# Patient Record
Sex: FEMALE | Race: WHITE | Employment: FULL TIME | URBAN - METROPOLITAN AREA
[De-identification: names, ages, dates, MRNs, and addresses within clinical notes are randomized per-mention and may not be internally consistent; named-entity substitution may affect disease eponyms.]

---

## 2023-01-13 ENCOUNTER — HOSPITAL ENCOUNTER (EMERGENCY)
Facility: HOSPITAL | Age: 33
Discharge: HOME | End: 2023-01-13
Attending: EMERGENCY MEDICINE

## 2023-01-13 VITALS
WEIGHT: 200 LBS | TEMPERATURE: 98 F | BODY MASS INDEX: 34.15 KG/M2 | HEIGHT: 64 IN | HEART RATE: 85 BPM | SYSTOLIC BLOOD PRESSURE: 144 MMHG | DIASTOLIC BLOOD PRESSURE: 93 MMHG | OXYGEN SATURATION: 97 % | RESPIRATION RATE: 17 BRPM

## 2023-01-13 DIAGNOSIS — G43.709 CHRONIC MIGRAINE WITHOUT AURA WITHOUT STATUS MIGRAINOSUS, NOT INTRACTABLE: Primary | ICD-10-CM

## 2023-01-13 LAB
ALBUMIN SERPL-MCNC: 4 G/DL (ref 3.4–5)
ALBUMIN/GLOB SERPL: 1 {RATIO} (ref 1–2)
ALP LIVER SERPL-CCNC: 124 U/L
ALT SERPL-CCNC: 27 U/L
ANION GAP SERPL CALC-SCNC: 6 MMOL/L (ref 0–18)
AST SERPL-CCNC: 20 U/L (ref 15–37)
BASOPHILS # BLD AUTO: 0.05 X10(3) UL (ref 0–0.2)
BASOPHILS NFR BLD AUTO: 0.4 %
BILIRUB SERPL-MCNC: 0.2 MG/DL (ref 0.1–2)
BUN BLD-MCNC: 12 MG/DL (ref 7–18)
CALCIUM BLD-MCNC: 9.9 MG/DL (ref 8.5–10.1)
CHLORIDE SERPL-SCNC: 106 MMOL/L (ref 98–112)
CO2 SERPL-SCNC: 26 MMOL/L (ref 21–32)
CREAT BLD-MCNC: 0.73 MG/DL
EOSINOPHIL # BLD AUTO: 0.03 X10(3) UL (ref 0–0.7)
EOSINOPHIL NFR BLD AUTO: 0.2 %
ERYTHROCYTE [DISTWIDTH] IN BLOOD BY AUTOMATED COUNT: 12.7 %
GFR SERPLBLD BASED ON 1.73 SQ M-ARVRAT: 113 ML/MIN/1.73M2 (ref 60–?)
GLOBULIN PLAS-MCNC: 4.2 G/DL (ref 2.8–4.4)
GLUCOSE BLD-MCNC: 97 MG/DL (ref 70–99)
HCT VFR BLD AUTO: 44.1 %
HGB BLD-MCNC: 15 G/DL
IMM GRANULOCYTES # BLD AUTO: 0.05 X10(3) UL (ref 0–1)
IMM GRANULOCYTES NFR BLD: 0.4 %
LYMPHOCYTES # BLD AUTO: 3.25 X10(3) UL (ref 1–4)
LYMPHOCYTES NFR BLD AUTO: 25.6 %
MCH RBC QN AUTO: 31.2 PG (ref 26–34)
MCHC RBC AUTO-ENTMCNC: 34 G/DL (ref 31–37)
MCV RBC AUTO: 91.7 FL
MONOCYTES # BLD AUTO: 0.87 X10(3) UL (ref 0.1–1)
MONOCYTES NFR BLD AUTO: 6.9 %
NEUTROPHILS # BLD AUTO: 8.45 X10 (3) UL (ref 1.5–7.7)
NEUTROPHILS # BLD AUTO: 8.45 X10(3) UL (ref 1.5–7.7)
NEUTROPHILS NFR BLD AUTO: 66.5 %
OSMOLALITY SERPL CALC.SUM OF ELEC: 286 MOSM/KG (ref 275–295)
PLATELET # BLD AUTO: 285 10(3)UL (ref 150–450)
POTASSIUM SERPL-SCNC: 3.7 MMOL/L (ref 3.5–5.1)
PROT SERPL-MCNC: 8.2 G/DL (ref 6.4–8.2)
RBC # BLD AUTO: 4.81 X10(6)UL
SODIUM SERPL-SCNC: 138 MMOL/L (ref 136–145)
WBC # BLD AUTO: 12.7 X10(3) UL (ref 4–11)

## 2023-01-13 PROCEDURE — 93005 ELECTROCARDIOGRAM TRACING: CPT

## 2023-01-13 PROCEDURE — 80053 COMPREHEN METABOLIC PANEL: CPT | Performed by: EMERGENCY MEDICINE

## 2023-01-13 PROCEDURE — 99285 EMERGENCY DEPT VISIT HI MDM: CPT

## 2023-01-13 PROCEDURE — 96374 THER/PROPH/DIAG INJ IV PUSH: CPT

## 2023-01-13 PROCEDURE — 96375 TX/PRO/DX INJ NEW DRUG ADDON: CPT

## 2023-01-13 PROCEDURE — 99284 EMERGENCY DEPT VISIT MOD MDM: CPT

## 2023-01-13 PROCEDURE — 93010 ELECTROCARDIOGRAM REPORT: CPT

## 2023-01-13 PROCEDURE — 85025 COMPLETE CBC W/AUTO DIFF WBC: CPT | Performed by: EMERGENCY MEDICINE

## 2023-01-13 PROCEDURE — 96361 HYDRATE IV INFUSION ADD-ON: CPT

## 2023-01-13 RX ORDER — KETOROLAC TROMETHAMINE 15 MG/ML
15 INJECTION, SOLUTION INTRAMUSCULAR; INTRAVENOUS ONCE
Status: COMPLETED | OUTPATIENT
Start: 2023-01-13 | End: 2023-01-13

## 2023-01-13 RX ORDER — METOCLOPRAMIDE HYDROCHLORIDE 5 MG/ML
10 INJECTION INTRAMUSCULAR; INTRAVENOUS ONCE
Status: COMPLETED | OUTPATIENT
Start: 2023-01-13 | End: 2023-01-13

## 2023-01-13 RX ORDER — BUTALBITAL, ACETAMINOPHEN AND CAFFEINE 50; 325; 40 MG/1; MG/1; MG/1
1 TABLET ORAL EVERY 6 HOURS PRN
Qty: 12 TABLET | Refills: 0 | Status: SHIPPED | OUTPATIENT
Start: 2023-01-13

## 2023-01-13 RX ORDER — DEXAMETHASONE SODIUM PHOSPHATE 10 MG/ML
10 INJECTION, SOLUTION INTRAMUSCULAR; INTRAVENOUS ONCE
Status: COMPLETED | OUTPATIENT
Start: 2023-01-13 | End: 2023-01-13

## 2023-01-13 RX ORDER — DIPHENHYDRAMINE HYDROCHLORIDE 50 MG/ML
25 INJECTION INTRAMUSCULAR; INTRAVENOUS ONCE
Status: COMPLETED | OUTPATIENT
Start: 2023-01-13 | End: 2023-01-13

## 2023-01-13 NOTE — ED INITIAL ASSESSMENT (HPI)
Patient here for eval of a migraine for a week. Patient states she went to  yesterday and her BP was high. +N/V. Also c/o lightheadedness.

## 2023-01-14 LAB
ATRIAL RATE: 80 BPM
P AXIS: 17 DEGREES
P-R INTERVAL: 150 MS
Q-T INTERVAL: 384 MS
QRS DURATION: 94 MS
QTC CALCULATION (BEZET): 442 MS
R AXIS: 51 DEGREES
T AXIS: 18 DEGREES
VENTRICULAR RATE: 80 BPM

## 2023-05-29 ENCOUNTER — HOSPITAL ENCOUNTER (EMERGENCY)
Facility: HOSPITAL | Age: 33
Discharge: LEFT BEFORE DISCHARGE | End: 2023-05-29
Payer: MEDICAID

## 2023-05-29 VITALS
DIASTOLIC BLOOD PRESSURE: 104 MMHG | TEMPERATURE: 97 F | OXYGEN SATURATION: 97 % | HEIGHT: 64 IN | HEART RATE: 88 BPM | WEIGHT: 216 LBS | RESPIRATION RATE: 18 BRPM | SYSTOLIC BLOOD PRESSURE: 136 MMHG | BODY MASS INDEX: 36.88 KG/M2

## 2023-05-29 RX ORDER — SUMATRIPTAN 20 MG/1
SPRAY NASAL EVERY 2 HOUR PRN
COMMUNITY
End: 2023-06-05 | Stop reason: ALTCHOICE

## 2023-05-30 ENCOUNTER — HOSPITAL ENCOUNTER (EMERGENCY)
Facility: HOSPITAL | Age: 33
Discharge: HOME | End: 2023-05-30
Attending: EMERGENCY MEDICINE
Payer: MEDICAID

## 2023-05-30 VITALS
HEART RATE: 107 BPM | SYSTOLIC BLOOD PRESSURE: 142 MMHG | OXYGEN SATURATION: 98 % | TEMPERATURE: 98 F | BODY MASS INDEX: 37 KG/M2 | WEIGHT: 216.06 LBS | DIASTOLIC BLOOD PRESSURE: 93 MMHG | RESPIRATION RATE: 18 BRPM

## 2023-05-30 DIAGNOSIS — G43.809 OTHER MIGRAINE WITHOUT STATUS MIGRAINOSUS, NOT INTRACTABLE: Primary | ICD-10-CM

## 2023-05-30 PROCEDURE — 99284 EMERGENCY DEPT VISIT MOD MDM: CPT

## 2023-05-30 PROCEDURE — 96375 TX/PRO/DX INJ NEW DRUG ADDON: CPT

## 2023-05-30 PROCEDURE — 96361 HYDRATE IV INFUSION ADD-ON: CPT

## 2023-05-30 PROCEDURE — 96374 THER/PROPH/DIAG INJ IV PUSH: CPT

## 2023-05-30 RX ORDER — METOCLOPRAMIDE HYDROCHLORIDE 5 MG/ML
10 INJECTION INTRAMUSCULAR; INTRAVENOUS ONCE
Status: COMPLETED | OUTPATIENT
Start: 2023-05-30 | End: 2023-05-30

## 2023-05-30 RX ORDER — DIHYDROERGOTAMINE MESYLATE 1 MG/ML
1 INJECTION, SOLUTION INTRAMUSCULAR; INTRAVENOUS; SUBCUTANEOUS ONCE
Status: COMPLETED | OUTPATIENT
Start: 2023-05-30 | End: 2023-05-30

## 2023-05-30 RX ORDER — NARATRIPTAN 1 MG/1
1 TABLET ORAL ONCE AS NEEDED
Qty: 20 TABLET | Refills: 0 | Status: SHIPPED | OUTPATIENT
Start: 2023-05-30 | End: 2023-05-30

## 2023-05-30 RX ORDER — DIPHENHYDRAMINE HYDROCHLORIDE 50 MG/ML
25 INJECTION INTRAMUSCULAR; INTRAVENOUS ONCE
Status: COMPLETED | OUTPATIENT
Start: 2023-05-30 | End: 2023-05-30

## 2023-05-30 RX ORDER — DROPERIDOL 2.5 MG/ML
2.5 INJECTION, SOLUTION INTRAMUSCULAR; INTRAVENOUS ONCE
Status: COMPLETED | OUTPATIENT
Start: 2023-05-30 | End: 2023-05-30

## 2023-05-30 RX ORDER — KETOROLAC TROMETHAMINE 15 MG/ML
15 INJECTION, SOLUTION INTRAMUSCULAR; INTRAVENOUS ONCE
Status: COMPLETED | OUTPATIENT
Start: 2023-05-30 | End: 2023-05-30

## 2023-05-30 NOTE — ED INITIAL ASSESSMENT (HPI)
Pt to ED for c/o migraine headache, nausea and light & sound sensitivity. Sumatriptan IN taken at 1700 PTA. Excedrin taken at 1900 PTA.

## 2023-05-30 NOTE — ED QUICK NOTES
Pt states the normal headache cocktail doesn't work, she did state she got relief with the DHE infusion the last time she was here.

## 2023-05-30 NOTE — ED INITIAL ASSESSMENT (HPI)
Migraine for past few days  Took excedrin today with no relief  Pain to left \"hemisphere\"  C/o dizziness  +nausea

## 2023-06-05 ENCOUNTER — OFFICE VISIT (OUTPATIENT)
Dept: FAMILY MEDICINE CLINIC | Facility: CLINIC | Age: 33
End: 2023-06-05
Payer: MEDICAID

## 2023-06-05 VITALS
OXYGEN SATURATION: 95 % | HEIGHT: 64 IN | TEMPERATURE: 98 F | SYSTOLIC BLOOD PRESSURE: 128 MMHG | DIASTOLIC BLOOD PRESSURE: 90 MMHG | HEART RATE: 72 BPM | BODY MASS INDEX: 36.88 KG/M2 | WEIGHT: 216 LBS | RESPIRATION RATE: 16 BRPM

## 2023-06-05 DIAGNOSIS — G43.719 INTRACTABLE CHRONIC MIGRAINE WITHOUT AURA AND WITHOUT STATUS MIGRAINOSUS: Primary | ICD-10-CM

## 2023-06-05 RX ORDER — NARATRIPTAN 2.5 MG/1
TABLET ORAL
COMMUNITY
Start: 2023-05-16 | End: 2023-06-05

## 2023-06-05 RX ORDER — KETOROLAC TROMETHAMINE 30 MG/ML
30 INJECTION, SOLUTION INTRAMUSCULAR; INTRAVENOUS ONCE
Status: COMPLETED | OUTPATIENT
Start: 2023-06-05 | End: 2023-06-05

## 2023-06-05 RX ORDER — RIMEGEPANT SULFATE 75 MG/75MG
TABLET, ORALLY DISINTEGRATING ORAL
COMMUNITY
Start: 2023-05-31

## 2023-06-05 RX ORDER — METOCLOPRAMIDE 5 MG/1
5 TABLET ORAL EVERY 6 HOURS PRN
Qty: 15 TABLET | Refills: 2 | Status: SHIPPED | OUTPATIENT
Start: 2023-06-05

## 2023-06-05 RX ORDER — DIHYDROERGOTAMINE MESYLATE 4 MG/ML
1 SPRAY NASAL AS NEEDED
Qty: 3 ML | Refills: 0 | Status: SHIPPED | OUTPATIENT
Start: 2023-06-05

## 2023-06-05 RX ADMIN — KETOROLAC TROMETHAMINE 30 MG: 30 INJECTION, SOLUTION INTRAMUSCULAR; INTRAVENOUS at 18:54:00

## 2023-06-07 ENCOUNTER — PATIENT MESSAGE (OUTPATIENT)
Dept: FAMILY MEDICINE CLINIC | Facility: CLINIC | Age: 33
End: 2023-06-07

## 2023-06-07 DIAGNOSIS — G43.719 INTRACTABLE CHRONIC MIGRAINE WITHOUT AURA AND WITHOUT STATUS MIGRAINOSUS: Primary | ICD-10-CM

## 2023-06-08 NOTE — TELEPHONE ENCOUNTER
From: Ender Nielsen  To: Angel Andino MD  Sent: 6/7/2023 2:35 PM CDT  Subject: Flower Valerio,  I have called around to several different pharmacies who have all told me their suppliers and the  are on back order for the medication with no idea when they will be able to order it. Is there an alternative medication within the same class that I can try?

## 2023-06-09 NOTE — TELEPHONE ENCOUNTER
I unfortunately don't have a good replacement for this in the class. Are we able to find her quick visit into neuro, if not us, [facility] or elsewhere?     Doyle Ge MD

## 2023-06-23 RX ORDER — RIMEGEPANT SULFATE 75 MG/75MG
1 TABLET, ORALLY DISINTEGRATING ORAL EVERY OTHER DAY
Qty: 45 TABLET | Refills: 1 | Status: SHIPPED | OUTPATIENT
Start: 2023-06-23 | End: 2023-07-23

## 2023-06-23 RX ORDER — NARATRIPTAN 2.5 MG/1
2.5 TABLET ORAL AS NEEDED
Qty: 21 TABLET | Refills: 3 | Status: SHIPPED | OUTPATIENT
Start: 2023-06-23

## 2023-07-15 DIAGNOSIS — G43.719 INTRACTABLE CHRONIC MIGRAINE WITHOUT AURA AND WITHOUT STATUS MIGRAINOSUS: ICD-10-CM

## 2023-07-17 RX ORDER — NARATRIPTAN 2.5 MG/1
2.5 TABLET ORAL AS NEEDED
Qty: 21 TABLET | Refills: 3 | Status: SHIPPED | OUTPATIENT
Start: 2023-07-17

## 2023-07-17 NOTE — TELEPHONE ENCOUNTER
.A refill request was received for:  Requested Prescriptions     Pending Prescriptions Disp Refills    Naratriptan HCl 2.5 MG Oral Tab 21 tablet 3     Sig: Take 1 tablet (2.5 mg total) by mouth as needed for Migraine.        Last refill date:   6/23/2023    Last office visit: 6/5/2023    Follow up due:  Future Appointments   Date Time Provider Elizabeth Babb   10/30/2023  2:15 PM DO PETER Cummings       Patient comment: I know its early but my migraines have been constant for the last week because I've had a cold/flu

## 2023-08-01 ENCOUNTER — OFFICE VISIT (OUTPATIENT)
Dept: FAMILY MEDICINE CLINIC | Facility: CLINIC | Age: 33
End: 2023-08-01
Payer: MEDICAID

## 2023-08-01 VITALS
BODY MASS INDEX: 36.37 KG/M2 | SYSTOLIC BLOOD PRESSURE: 120 MMHG | WEIGHT: 213 LBS | HEART RATE: 97 BPM | OXYGEN SATURATION: 98 % | HEIGHT: 64 IN | RESPIRATION RATE: 16 BRPM | DIASTOLIC BLOOD PRESSURE: 80 MMHG

## 2023-08-01 DIAGNOSIS — G43.711 CHRONIC MIGRAINE WITHOUT AURA, WITH INTRACTABLE MIGRAINE, SO STATED, WITH STATUS MIGRAINOSUS: ICD-10-CM

## 2023-08-01 DIAGNOSIS — M54.2 NECK PAIN: Primary | ICD-10-CM

## 2023-08-01 DIAGNOSIS — F41.9 ANXIETY: ICD-10-CM

## 2023-08-01 PROBLEM — M47.812 CERVICAL FACET SYNDROME: Status: ACTIVE | Noted: 2021-02-25

## 2023-08-01 PROBLEM — G43.909 MIGRAINE: Status: ACTIVE | Noted: 2021-05-14

## 2023-08-01 PROBLEM — M50.30 DDD (DEGENERATIVE DISC DISEASE), CERVICAL: Status: ACTIVE | Noted: 2021-02-25

## 2023-08-01 PROCEDURE — 3008F BODY MASS INDEX DOCD: CPT | Performed by: FAMILY MEDICINE

## 2023-08-01 PROCEDURE — 99214 OFFICE O/P EST MOD 30 MIN: CPT | Performed by: FAMILY MEDICINE

## 2023-08-01 PROCEDURE — 3074F SYST BP LT 130 MM HG: CPT | Performed by: FAMILY MEDICINE

## 2023-08-01 PROCEDURE — 3079F DIAST BP 80-89 MM HG: CPT | Performed by: FAMILY MEDICINE

## 2023-08-01 RX ORDER — VENLAFAXINE HYDROCHLORIDE 75 MG/1
75 CAPSULE, EXTENDED RELEASE ORAL DAILY
Qty: 30 CAPSULE | Refills: 0 | Status: SHIPPED | OUTPATIENT
Start: 2023-08-15

## 2023-08-01 RX ORDER — CYCLOBENZAPRINE HCL 5 MG
1 TABLET ORAL 2 TIMES DAILY PRN
COMMUNITY
Start: 2022-10-13 | End: 2023-08-01 | Stop reason: ALTCHOICE

## 2023-08-01 RX ORDER — NORGESTIMATE AND ETHINYL ESTRADIOL 7DAYSX3 LO
1 KIT ORAL DAILY
COMMUNITY
Start: 2023-01-11 | End: 2023-08-01 | Stop reason: ALTCHOICE

## 2023-08-01 RX ORDER — CLONAZEPAM 1 MG/1
1 TABLET ORAL NIGHTLY PRN
COMMUNITY
Start: 2022-11-15 | End: 2023-08-01 | Stop reason: ALTCHOICE

## 2023-08-01 RX ORDER — VENLAFAXINE HYDROCHLORIDE 37.5 MG/1
37.5 CAPSULE, EXTENDED RELEASE ORAL DAILY
Qty: 15 CAPSULE | Refills: 0 | Status: SHIPPED | OUTPATIENT
Start: 2023-08-01

## 2023-08-01 RX ORDER — MELOXICAM 15 MG/1
15 TABLET ORAL DAILY
Qty: 14 TABLET | Refills: 0 | Status: SHIPPED | OUTPATIENT
Start: 2023-08-01

## 2023-08-01 RX ORDER — SERTRALINE HYDROCHLORIDE 100 MG/1
100 TABLET, FILM COATED ORAL DAILY
COMMUNITY
Start: 2022-11-15 | End: 2023-08-01 | Stop reason: ALTCHOICE

## 2023-08-03 DIAGNOSIS — G43.719 INTRACTABLE CHRONIC MIGRAINE WITHOUT AURA AND WITHOUT STATUS MIGRAINOSUS: ICD-10-CM

## 2023-08-04 RX ORDER — RIMEGEPANT SULFATE 75 MG/75MG
2.5 TABLET, ORALLY DISINTEGRATING ORAL DAILY
Qty: 30 TABLET | Refills: 0 | Status: SHIPPED | OUTPATIENT
Start: 2023-08-04

## 2023-08-04 RX ORDER — NARATRIPTAN 2.5 MG/1
2.5 TABLET ORAL AS NEEDED
Qty: 21 TABLET | Refills: 3 | Status: SHIPPED | OUTPATIENT
Start: 2023-08-04

## 2023-08-04 NOTE — TELEPHONE ENCOUNTER
Patient comment: Pharmacy says they need authorization because I need to pick it up early   LV:8/1/2023  LR:NARATRIPTAN 7/17/2023  Charisse Penn

## 2023-08-12 DIAGNOSIS — F41.9 ANXIETY: ICD-10-CM

## 2023-08-14 RX ORDER — VENLAFAXINE HYDROCHLORIDE 37.5 MG/1
37.5 CAPSULE, EXTENDED RELEASE ORAL DAILY
Qty: 15 CAPSULE | Refills: 0 | OUTPATIENT
Start: 2023-08-14

## 2023-08-14 NOTE — TELEPHONE ENCOUNTER
.A refill request was received for:  Requested Prescriptions     Pending Prescriptions Disp Refills    VENLAFAXINE ER 37.5 MG Oral Capsule SR 24 Hr [Pharmacy Med Name: VENLAFAXINE HCL ER 37.5 MG CAP] 15 capsule 0     Sig: TAKE 1 CAPSULE BY MOUTH DAILY       Last refill date:       Last office visit: 8/1/2023    Follow up due:  Future Appointments   Date Time Provider Elizabeth Babb   10/30/2023  2:15 PM DO PETER Griffiths

## 2023-08-27 DIAGNOSIS — G43.719 INTRACTABLE CHRONIC MIGRAINE WITHOUT AURA AND WITHOUT STATUS MIGRAINOSUS: ICD-10-CM

## 2023-08-27 DIAGNOSIS — F41.9 ANXIETY: ICD-10-CM

## 2023-08-28 RX ORDER — NARATRIPTAN 2.5 MG/1
2.5 TABLET ORAL AS NEEDED
Qty: 21 TABLET | Refills: 3 | Status: SHIPPED | OUTPATIENT
Start: 2023-08-28

## 2023-08-28 RX ORDER — VENLAFAXINE HYDROCHLORIDE 75 MG/1
75 CAPSULE, EXTENDED RELEASE ORAL DAILY
Qty: 30 CAPSULE | Refills: 0 | Status: SHIPPED | OUTPATIENT
Start: 2023-08-28

## 2023-08-28 NOTE — TELEPHONE ENCOUNTER
A refill request was received for:  Requested Prescriptions     Pending Prescriptions Disp Refills    Naratriptan HCl 2.5 MG Oral Tab 21 tablet 3     Sig: Take 1 tablet (2.5 mg total) by mouth as needed for Migraine.        Last refill date:8/4/2023       Last office visit:8/1/2023    Follow up due:  Future Appointments   Date Time Provider Elizabeth Babb   10/30/2023  2:15 PM DO PETER Teague

## 2023-08-28 NOTE — TELEPHONE ENCOUNTER
.A refill request was received for:  Requested Prescriptions     Pending Prescriptions Disp Refills    VENLAFAXINE ER 75 MG Oral Capsule SR 24 Hr [Pharmacy Med Name: VENLAFAXINE HCL ER 75 MG CAP] 30 capsule 0     Sig: TAKE 1 CAPSULE BY MOUTH DAILY       Last refill date:   8/15/2023    Last office visit: 8/1/2023    Follow up due:  Future Appointments   Date Time Provider Elizabeth Babb   10/30/2023  2:15 PM DO PETER Doty

## 2023-09-01 ENCOUNTER — TELEPHONE (OUTPATIENT)
Dept: FAMILY MEDICINE CLINIC | Facility: CLINIC | Age: 33
End: 2023-09-01

## 2023-09-18 DIAGNOSIS — G43.719 INTRACTABLE CHRONIC MIGRAINE WITHOUT AURA AND WITHOUT STATUS MIGRAINOSUS: ICD-10-CM

## 2023-09-18 RX ORDER — NARATRIPTAN 2.5 MG/1
2.5 TABLET ORAL AS NEEDED
Qty: 21 TABLET | Refills: 3 | Status: SHIPPED | OUTPATIENT
Start: 2023-09-18

## 2023-09-23 DIAGNOSIS — F41.9 ANXIETY: ICD-10-CM

## 2023-09-25 RX ORDER — VENLAFAXINE HYDROCHLORIDE 75 MG/1
75 CAPSULE, EXTENDED RELEASE ORAL DAILY
Qty: 30 CAPSULE | Refills: 0 | Status: SHIPPED | OUTPATIENT
Start: 2023-09-25

## 2023-09-25 NOTE — TELEPHONE ENCOUNTER
/2023.A refill request was received for:  Requested Prescriptions     Pending Prescriptions Disp Refills    VENLAFAXINE ER 75 MG Oral Capsule SR 24 Hr [Pharmacy Med Name: VENLAFAXINE HCL ER 75 MG CAP] 30 capsule 0     Sig: TAKE 1 CAPSULE BY MOUTH DAILY       Last refill date:   8/28/2023    Last office visit: 8/1/2023    Follow up due:  Future Appointments   Date Time Provider Elizabeth Babb   10/30/2023  2:15 PM DO PETER Rodriguez

## 2023-10-09 DIAGNOSIS — M54.2 NECK PAIN: ICD-10-CM

## 2023-10-09 DIAGNOSIS — G43.719 INTRACTABLE CHRONIC MIGRAINE WITHOUT AURA AND WITHOUT STATUS MIGRAINOSUS: ICD-10-CM

## 2023-10-10 DIAGNOSIS — G43.719 INTRACTABLE CHRONIC MIGRAINE WITHOUT AURA AND WITHOUT STATUS MIGRAINOSUS: ICD-10-CM

## 2023-10-10 RX ORDER — MELOXICAM 15 MG/1
15 TABLET ORAL DAILY
Qty: 14 TABLET | Refills: 0 | Status: SHIPPED | OUTPATIENT
Start: 2023-10-10

## 2023-10-10 RX ORDER — NARATRIPTAN 2.5 MG/1
2.5 TABLET ORAL AS NEEDED
Qty: 21 TABLET | Refills: 3 | Status: SHIPPED | OUTPATIENT
Start: 2023-10-10 | End: 2023-10-10

## 2023-10-10 NOTE — TELEPHONE ENCOUNTER
----- Message from Tiara Adamson sent at 7/18/2017  9:39 AM CDT -----  Contact: [facility] [phone]   Pharmacy requesting SITagliptin (JANUVIA) 50 MG Tab Take 2 tablets (100 mg total) by mouth once daily changed to JANUVIA 100 MG Tablet once daily. Please send new script to [facility] pharmacy          .A refill request was received for:  Requested Prescriptions     Pending Prescriptions Disp Refills    Meloxicam 15 MG Oral Tab 14 tablet 0     Sig: Take 1 tablet (15 mg total) by mouth daily. Naratriptan HCl 2.5 MG Oral Tab 21 tablet 3     Sig: Take 1 tablet (2.5 mg total) by mouth as needed for Migraine.        Last refill date:   naratriptan 9/18/23  Meloxicam 8/1/2023    Last office visit: 8/1/2023    Follow up due:  Future Appointments   Date Time Provider Elizabeth Babb   10/30/2023  2:15 PM DO PETER Bello

## 2023-10-11 RX ORDER — NARATRIPTAN 2.5 MG/1
2.5 TABLET ORAL AS NEEDED
Qty: 21 TABLET | Refills: 3 | Status: SHIPPED | OUTPATIENT
Start: 2023-10-11

## 2023-10-13 ENCOUNTER — TELEPHONE (OUTPATIENT)
Dept: FAMILY MEDICINE CLINIC | Facility: CLINIC | Age: 33
End: 2023-10-13

## 2023-10-13 NOTE — TELEPHONE ENCOUNTER
Patient informed medication has been refilled. Malinda Snowden states she has secured neurology appointment on 10/30/23   Agreed to note.

## 2023-10-13 NOTE — TELEPHONE ENCOUNTER
Pharmacy wants Dr. Dannie Kowalski to be aware pt continuously requests early refills on the Naratriptan HCl 2.5 MG. They said pt supposed to take only when needed - pharmacy believes pt is taking everyday.

## 2023-10-13 NOTE — TELEPHONE ENCOUNTER
She probably is, she has essentially constant migraines. Hence why I've been trying to get her into neurology. I'm not worried about abuse, more side effects.     Leonid Lewis MD

## 2023-10-16 ENCOUNTER — TELEPHONE (OUTPATIENT)
Dept: FAMILY MEDICINE CLINIC | Facility: CLINIC | Age: 33
End: 2023-10-16

## 2023-10-25 DIAGNOSIS — F41.9 ANXIETY: ICD-10-CM

## 2023-10-25 RX ORDER — VENLAFAXINE HYDROCHLORIDE 75 MG/1
75 CAPSULE, EXTENDED RELEASE ORAL DAILY
Qty: 90 CAPSULE | Refills: 0 | Status: SHIPPED | OUTPATIENT
Start: 2023-10-25

## 2023-10-25 RX ORDER — VENLAFAXINE HYDROCHLORIDE 75 MG/1
75 CAPSULE, EXTENDED RELEASE ORAL DAILY
Qty: 30 CAPSULE | Refills: 0 | Status: SHIPPED | OUTPATIENT
Start: 2023-10-25 | End: 2023-10-25

## 2023-10-25 NOTE — TELEPHONE ENCOUNTER
.A refill request was received for:  Requested Prescriptions     Pending Prescriptions Disp Refills    VENLAFAXINE ER 75 MG Oral Capsule SR 24 Hr [Pharmacy Med Name: VENLAFAXINE HCL ER 75 MG CAP] 30 capsule 0     Sig: TAKE 1 CAPSULE BY MOUTH DAILY       Last refill date:   9/25/2023    Last office visit: 8/1/0223    Follow up due:  Future Appointments   Date Time Provider Elizabeth Babb   10/30/2023  2:15 PM DO PETER Darling

## 2023-10-30 ENCOUNTER — TELEPHONE (OUTPATIENT)
Dept: NEUROLOGY | Facility: CLINIC | Age: 33
End: 2023-10-30

## 2023-10-30 ENCOUNTER — OFFICE VISIT (OUTPATIENT)
Dept: NEUROLOGY | Facility: CLINIC | Age: 33
End: 2023-10-30

## 2023-10-30 VITALS
WEIGHT: 212.38 LBS | DIASTOLIC BLOOD PRESSURE: 88 MMHG | RESPIRATION RATE: 16 BRPM | BODY MASS INDEX: 36 KG/M2 | SYSTOLIC BLOOD PRESSURE: 122 MMHG | OXYGEN SATURATION: 97 % | HEART RATE: 100 BPM

## 2023-10-30 DIAGNOSIS — G43.711 CHRONIC MIGRAINE WITHOUT AURA, WITH INTRACTABLE MIGRAINE, SO STATED, WITH STATUS MIGRAINOSUS: Primary | ICD-10-CM

## 2023-10-30 PROCEDURE — 3079F DIAST BP 80-89 MM HG: CPT | Performed by: OTHER

## 2023-10-30 PROCEDURE — 99204 OFFICE O/P NEW MOD 45 MIN: CPT | Performed by: OTHER

## 2023-10-30 PROCEDURE — 3074F SYST BP LT 130 MM HG: CPT | Performed by: OTHER

## 2023-10-30 RX ORDER — ATOGEPANT 60 MG/1
60 TABLET ORAL DAILY
Qty: 30 TABLET | Refills: 3 | Status: SHIPPED | OUTPATIENT
Start: 2023-10-30 | End: 2023-11-03

## 2023-10-30 RX ORDER — ATOGEPANT 60 MG/1
60 TABLET ORAL DAILY
Qty: 32 TABLET | Refills: 0 | COMMUNITY
Start: 2023-10-30 | End: 2023-10-31

## 2023-10-30 NOTE — H&P
Neurology H&P    Wilmar Isaac Patient Status:  No patient class for patient encounter    1990 MRN [id number]   Location [facility], [address] [facility], [address] [place] [address] Attending No att. providers found   Hosp Day # 0 PCP Boubacar Umanzor MD     Subjective: Wilmar Isaac is a(n) 29year old female with a PH significant for chronic migraines. She comes to the neurology clinic to establish care for her migraines She has seen other neurologists in the past for these same headaches. She states that she usually has to be hospitalized every few months with migraines. She has been on many different preventatives and prophylactic medications. She states that she started to have migraines at age 9. She gets 20 or more migraines per month. She states that they are more intense around her menstrual cycle. She has seen pain management as well as several neurologists for her migraines. She currently takes Naratriptan and reports using all of her monthly allotment of naratriptan every month. She is on venlafaxine at this time. She endorses + photo and phonophobia. She does get nauseated and has vomiting as well. She states that all of her headaches started n the base of the skull at the back of her head and then radiate over her head to her eye. They can be on one side or the other. She has a father with migraines. She denies any visual aura prior to her migraines. She drink  1-2 cups coffee per day. She states that she only get 4-5 hours of sleep on any regular basis. Current Medications:  Current Outpatient Medications   Medication Sig Dispense Refill    venlafaxine ER 75 MG Oral Capsule SR 24 Hr Take 1 capsule (75 mg total) by mouth daily. 90 capsule 0    Naratriptan HCl 2.5 MG Oral Tab Take 1 tablet (2.5 mg total) by mouth as needed for Migraine. 21 tablet 3    Meloxicam 15 MG Oral Tab Take 1 tablet (15 mg total) by mouth daily.  14 tablet 0    metoclopramide 5 MG Oral Tab Take 1 tablet (5 mg total) by mouth every 6 (six) hours as needed. 15 tablet 2       Problem List:  Patient Active Problem List:     Cervical facet syndrome     Chronic migraine without aura, with intractable migraine, so stated, with status migrainosus     DDD (degenerative disc disease), cervical     Migraine      PMHx:  Past Medical History:   Diagnosis Date    Essential hypertension     Migraines        PSHx:  Past Surgical History:   Procedure Laterality Date    TONSILLECTOMY         SocHx:  Social History     Socioeconomic History    Marital status: Single   Tobacco Use    Smoking status: Never    Smokeless tobacco: Never   Vaping Use    Vaping Use: Never used   Substance and Sexual Activity    Alcohol use: Never    Drug use: Never   Other Topics Concern    Caffeine Concern Yes     Comment: coffee 1-2 cups    Exercise No       Family History:  No family history on file. Father with migraines    ROS:  10 point ROS completed and was negative, except for pertinent positive and negatives stated in subjective. Objective/Physical Exam:    Vital Signs:  Blood pressure 122/88, pulse 100, resp. rate 16, weight 212 lb 6.4 oz (96.3 kg), last menstrual period 05/25/2023, SpO2 97%. Gen: Awake and in no apparent distress  HEENT: moist mucus membranes  Neck: Supple  Cardiovascular: Regular rate and rhythm, no murmur  Pulm: CTAB  GI: non-tender, normal bowel sounds  Skin: normal, dry  Extremities: No clubbing or cyanosis      Neurologic:   MENTAL STATUS: alert, ox3, normal attention, language and fund of knowledge. CRANIAL NERVES II to XII: PERRLA, no ptosis or diplopia, EOM intact, facial sensation intact, strong eye closure, face is symmetric, no dysarthria, tongue midline,  no tongue fasciculations or atrophy, strong shoulder shrug.     MOTOR EXAMINATION: normal tone, no fasciculations, normal strength throughout in UEs and LEs      SENSORY EXAMINATION:  UE: intact to light touch, pinprick intact  LE: intact to light touch, pinprick intact    COORDINATION:  No dysmetria, or intention tremors     REFLEXES: 2+ at biceps, 2+ brachioradialis, 2+ at patella, 2+ at the ankles     GAIT: normal stance, normal toe gait and heel gait, tandem well. Romberg's: negative        Labs:       Imaging:  MRI Brain 2/12/21    IMPRESSION:     No acute intracranial abnormality. MRI C spine 2020  Findings:     Mild multilevel disc desiccation and bulging without significant loss of intervertebral disc space   height, cervical disc osteophyte, facet or uncinate osteoarthritis prevertebral, paravertebral, marrow or   disc space edema scoliosis, kyphosis, spondylolisthesis, cervical vertebral body or dens fracture. Posterior elements intact. Cerebellar tonsils terminate at a normal anatomical position. No abnormal   signal in the cervical spinal cord. C2-3: No significant spinal canal or neuroforaminal stenosis. C3-4: No significant spinal canal or neuroforaminal stenosis. C4-5: No significant spinal canal or neuroforaminal stenosis. C5-6: No significant spinal canal or neuroforaminal stenosis. C6-7: No significant spinal canal or neuroforaminal stenosis. C7-T1:No significant spinal canal or neural foramina stenosis. IMPRESSION:     No significant spinal canal or neuroforaminal stenosis. Assessment: This is a 28 y/o female with chronic intractible miograines. She has tried many different abortives and preventatives. Including Botox, Nurtec, Aimovig, Ajovy, Amitriptyline, nortriptyline, propranolol, Depakote, Topamax, venlafaxine, Norco, Fioricet, multiple titans, and OTC analgesics. She has had facet and cervical epidurals as well as SPG block none of which provided much relief. She states that al medications that she has tried either have not worked for her or had SEs or just abruptly stopped working. She is currently taking up to 21 naratriptan per month for her migraines.  I have cautioned her against medication overuse as this could be contributing to her migraines. Her description of the pain (radiating from the occipital area over the head and into the eyes) may have an occipital neuralgia. I will try occipital nerve blocks as well.        Plan:  Chronic migraine w/o aura  - Has tried many different medications including Botox, Nurtec, Aimovig, Ajovy, Amitriptyline, nortriptyline, propranolol, Depakote, Topamax, venlafaxine, Norco, Fioricet, multiple titans, and OTC analgesics  - Qulipta 60mg PO Qday  - Will also try Occipital nerve blocks and/or TPIs    Magaly Song, DO  Neurology

## 2023-10-30 NOTE — PROGRESS NOTES
Patient states 20 migraines monthly. Patient states migraines occur mostly on the left side. Patient states history of migraines from the age of 9. Denies changes in speech, balance or memory. Denies facial numbness or tingling. Denies visual changes. Patient states nausea with migraines.

## 2023-10-30 NOTE — TELEPHONE ENCOUNTER
Pt in office today and provider would like to give pt NBI/TPI.    Referral placed and routed to PA team.

## 2023-10-31 NOTE — TELEPHONE ENCOUNTER
Started PA with Summer at [facility] [phone]  CPT codes 20552x2,20553x2,64405x2  Faxed clinicals to [phone] under pending #[id number]

## 2023-11-02 ENCOUNTER — PATIENT MESSAGE (OUTPATIENT)
Dept: NEUROLOGY | Facility: CLINIC | Age: 33
End: 2023-11-02

## 2023-11-02 DIAGNOSIS — G43.711 CHRONIC MIGRAINE WITHOUT AURA, WITH INTRACTABLE MIGRAINE, SO STATED, WITH STATUS MIGRAINOSUS: Primary | ICD-10-CM

## 2023-11-03 RX ORDER — ATOGEPANT 60 MG/1
60 TABLET ORAL DAILY
Qty: 30 TABLET | Refills: 3 | Status: SHIPPED | OUTPATIENT
Start: 2023-11-03

## 2023-11-03 NOTE — TELEPHONE ENCOUNTER
Tracey Lamb approved 8/5/2023-11/3/2024. Approval letter faxed to [address] [place] [address] with fax confirmation received.

## 2023-11-03 NOTE — TELEPHONE ENCOUNTER
From: Radha Ferreira  To: Siva Gomez  Sent: 11/2/2023 6:49 PM CDT  Subject: Clarisse Pabon Afternoon,  I have not received a call from the pharmacy yet to see if I am eligible for assistance with Dede Talbot. I started taking it Monday after my appointment and have not had any migraine pain since. Is there a way to check with my insurance to see if they will cover it?

## 2023-11-06 ENCOUNTER — PATIENT MESSAGE (OUTPATIENT)
Dept: NEUROLOGY | Facility: CLINIC | Age: 33
End: 2023-11-06

## 2023-11-06 NOTE — TELEPHONE ENCOUNTER
From: Melissa Sosa  To: Jordon May  Sent: 11/6/2023 8:33 AM CST  Subject: Medication    Good morning,   Yesterday and today are the first days I've had a migraine since taking Qulipta. Can you send a prescription for naratriptan to my pharmacy?

## 2023-11-09 ENCOUNTER — PATIENT MESSAGE (OUTPATIENT)
Dept: NEUROLOGY | Facility: CLINIC | Age: 33
End: 2023-11-09

## 2023-11-09 NOTE — TELEPHONE ENCOUNTER
From: Talia Gay  To: Suzanna Oconnor  Sent: 11/9/2023 1:21 PM CST  Subject: Medications    Good Morning,  I have taken several at home pregnancy tests that have all come back positive. I have an appointment with my primary on the 14th. Is naratriptan and qulipta safe to use if I am indeed pregnant?  If not are there any alternatives that are?

## 2023-11-14 ENCOUNTER — OFFICE VISIT (OUTPATIENT)
Dept: FAMILY MEDICINE CLINIC | Facility: CLINIC | Age: 33
End: 2023-11-14
Payer: MEDICAID

## 2023-11-14 VITALS
HEIGHT: 64 IN | BODY MASS INDEX: 36.19 KG/M2 | OXYGEN SATURATION: 96 % | WEIGHT: 212 LBS | RESPIRATION RATE: 16 BRPM | HEART RATE: 66 BPM | SYSTOLIC BLOOD PRESSURE: 112 MMHG | DIASTOLIC BLOOD PRESSURE: 80 MMHG | TEMPERATURE: 99 F

## 2023-11-14 DIAGNOSIS — Z34.90 EARLY STAGE OF PREGNANCY: ICD-10-CM

## 2023-11-14 DIAGNOSIS — G43.711 CHRONIC MIGRAINE WITHOUT AURA, WITH INTRACTABLE MIGRAINE, SO STATED, WITH STATUS MIGRAINOSUS: Primary | ICD-10-CM

## 2023-11-14 PROCEDURE — 3079F DIAST BP 80-89 MM HG: CPT | Performed by: FAMILY MEDICINE

## 2023-11-14 PROCEDURE — 3074F SYST BP LT 130 MM HG: CPT | Performed by: FAMILY MEDICINE

## 2023-11-14 PROCEDURE — 99214 OFFICE O/P EST MOD 30 MIN: CPT | Performed by: FAMILY MEDICINE

## 2023-11-14 PROCEDURE — 3008F BODY MASS INDEX DOCD: CPT | Performed by: FAMILY MEDICINE

## 2023-11-14 RX ORDER — BUTALBITAL, ACETAMINOPHEN AND CAFFEINE 50; 325; 40 MG/1; MG/1; MG/1
1 TABLET ORAL EVERY 6 HOURS PRN
Qty: 30 TABLET | Refills: 1 | Status: SHIPPED | OUTPATIENT
Start: 2023-11-14

## 2023-11-14 RX ORDER — VENLAFAXINE HYDROCHLORIDE 37.5 MG/1
37.5 CAPSULE, EXTENDED RELEASE ORAL DAILY
Qty: 21 CAPSULE | Refills: 0 | Status: SHIPPED | OUTPATIENT
Start: 2023-11-14

## 2023-11-16 ENCOUNTER — TELEPHONE (OUTPATIENT)
Dept: FAMILY MEDICINE CLINIC | Facility: CLINIC | Age: 33
End: 2023-11-16

## 2023-11-16 NOTE — TELEPHONE ENCOUNTER
Faxed received from [place]:  Fioricet drug interaction with Qulipta butalbital decreases effectiveness of Eugene Andressa, is MD aware? Does MD still want pt to have [address]? Need documentation for audit purpose.    Fax: [phone]

## 2023-11-17 NOTE — TELEPHONE ENCOUNTER
Might have responded to this already, but she is stopping Faith Brown going forward due to pregnancy    Naif Covarrubias MD

## 2023-11-27 ENCOUNTER — ULTRASOUND ENCOUNTER (OUTPATIENT)
Facility: CLINIC | Age: 33
End: 2023-11-27
Payer: MEDICAID

## 2023-11-27 ENCOUNTER — INITIAL PRENATAL (OUTPATIENT)
Dept: OBGYN CLINIC | Facility: CLINIC | Age: 33
End: 2023-11-27
Payer: MEDICAID

## 2023-11-27 DIAGNOSIS — E66.09 OTHER OBESITY DUE TO EXCESS CALORIES AFFECTING PREGNANCY, ANTEPARTUM: ICD-10-CM

## 2023-11-27 DIAGNOSIS — Z34.91 INITIAL OBSTETRIC VISIT IN FIRST TRIMESTER: Primary | ICD-10-CM

## 2023-11-27 DIAGNOSIS — Z34.01 ENCOUNTER FOR SUPERVISION OF NORMAL FIRST PREGNANCY IN FIRST TRIMESTER: ICD-10-CM

## 2023-11-27 DIAGNOSIS — O99.210 OTHER OBESITY DUE TO EXCESS CALORIES AFFECTING PREGNANCY, ANTEPARTUM: ICD-10-CM

## 2023-11-27 LAB
APPEARANCE: CLEAR
BILIRUBIN: NEGATIVE
GLUCOSE (URINE DIPSTICK): NEGATIVE MG/DL
KETONES (URINE DIPSTICK): NEGATIVE MG/DL
LEUKOCYTES: NEGATIVE
MULTISTIX LOT#: NORMAL NUMERIC
NITRITE, URINE: NEGATIVE
OCCULT BLOOD: NEGATIVE
PH, URINE: 6 (ref 4.5–8)
PROTEIN (URINE DIPSTICK): NEGATIVE MG/DL
SPECIFIC GRAVITY: 1.02 (ref 1–1.03)
URINE-COLOR: YELLOW
UROBILINOGEN,SEMI-QN: 0.2 MG/DL (ref 0–1.9)

## 2023-11-27 PROCEDURE — 3008F BODY MASS INDEX DOCD: CPT

## 2023-11-27 PROCEDURE — 88175 CYTOPATH C/V AUTO FLUID REDO: CPT

## 2023-11-27 PROCEDURE — 76801 OB US < 14 WKS SINGLE FETUS: CPT | Performed by: OBSTETRICS & GYNECOLOGY

## 2023-11-27 PROCEDURE — 81002 URINALYSIS NONAUTO W/O SCOPE: CPT

## 2023-11-27 PROCEDURE — 87591 N.GONORRHOEAE DNA AMP PROB: CPT

## 2023-11-27 PROCEDURE — 87491 CHLMYD TRACH DNA AMP PROBE: CPT

## 2023-11-27 PROCEDURE — 87624 HPV HI-RISK TYP POOLED RSLT: CPT

## 2023-11-27 PROCEDURE — 87086 URINE CULTURE/COLONY COUNT: CPT

## 2023-11-27 PROCEDURE — 87625 HPV TYPES 16 & 18 ONLY: CPT

## 2023-11-27 PROCEDURE — 3074F SYST BP LT 130 MM HG: CPT

## 2023-11-27 PROCEDURE — 76817 TRANSVAGINAL US OBSTETRIC: CPT | Performed by: OBSTETRICS & GYNECOLOGY

## 2023-11-27 PROCEDURE — 0500F INITIAL PRENATAL CARE VISIT: CPT

## 2023-11-27 PROCEDURE — 3078F DIAST BP <80 MM HG: CPT

## 2023-11-27 NOTE — PROGRESS NOTES
Initial OB 6w3d    FRANCIS by 6w3d US does not correlate with LMP   - per pt her periods are regular     - a three week discrepancy     1st trimester US done in clinic today:   single viable iup at 6w3d by CRL(9w3 d by LMP)  fht= 112bpm  ys present  bl ov are wnls  -consulted with E.P. - progesterone level ordered     OBHx:   PMHx: denies hepatitis, blood transfusion, VTE, HSV or congenital heart defects  -has history of PVC - had a Holter monitor evaluation 4 years ago, was told her PVCs are minor and offered her medications if she wanted it - she declined - feeling fine.   -hx of intractable migraines - is seeing Dr. Nick Cervantes, neurology - currently taking Naratriptan, she has symptoms 3-4 times a week.    Last Pap: unsure, Pap done today   Paternal Hx: Brugada syndrome - she tested negative   PSHx: tonsillectomy     Genetic Screening   -NIPT & Carrier - address at next visit     H/o Intractable migraines  -seeing Dr. Nick Cervantes, neurology, currently taking Naratriptan 2.5 mg as needed  -she was taking Bety Bannister- stopped when found out she is pregnant.   -per patient, when she notified Dr. Jeet Hay she is pregnant, he is no longer comfortable managing her migraine medication.   -she has referral to the Pain Clinic - recommend for her to make an appointment     H/O PVC   -last Holter monitoring - 4 years ago  - if she is having symptoms - a repeat 24 hour EKG may be considered     Obesity  -L2US, growths US, NSTs   -CMP, early 1 hr GTT added to PNL

## 2023-11-28 ENCOUNTER — TELEPHONE (OUTPATIENT)
Facility: CLINIC | Age: 33
End: 2023-11-28

## 2023-11-28 VITALS
HEIGHT: 64 IN | SYSTOLIC BLOOD PRESSURE: 120 MMHG | WEIGHT: 209.44 LBS | HEART RATE: 86 BPM | BODY MASS INDEX: 35.76 KG/M2 | DIASTOLIC BLOOD PRESSURE: 77 MMHG

## 2023-11-28 DIAGNOSIS — O36.80X0 PREGNANCY WITH INCONCLUSIVE FETAL VIABILITY, SINGLE OR UNSPECIFIED FETUS: Primary | ICD-10-CM

## 2023-11-28 LAB
C TRACH DNA SPEC QL NAA+PROBE: NEGATIVE
N GONORRHOEA DNA SPEC QL NAA+PROBE: NEGATIVE

## 2023-11-28 NOTE — TELEPHONE ENCOUNTER
Discussed with patient triptans and how it should be used as needed, not daily use. Encouraged to use Fiorcet 1-2 tablets every 4 hours, max 6 tablets daily.

## 2023-11-29 ENCOUNTER — LAB ENCOUNTER (OUTPATIENT)
Dept: LAB | Facility: HOSPITAL | Age: 33
End: 2023-11-29
Attending: FAMILY MEDICINE
Payer: MEDICAID

## 2023-11-29 DIAGNOSIS — O99.210 OTHER OBESITY DUE TO EXCESS CALORIES AFFECTING PREGNANCY, ANTEPARTUM: ICD-10-CM

## 2023-11-29 DIAGNOSIS — Z34.91 INITIAL OBSTETRIC VISIT IN FIRST TRIMESTER: ICD-10-CM

## 2023-11-29 DIAGNOSIS — E66.09 OTHER OBESITY DUE TO EXCESS CALORIES AFFECTING PREGNANCY, ANTEPARTUM: ICD-10-CM

## 2023-11-29 LAB
ANTIBODY SCREEN: NEGATIVE
BASOPHILS # BLD AUTO: 0.04 X10(3) UL (ref 0–0.2)
BASOPHILS NFR BLD AUTO: 0.3 %
EOSINOPHIL # BLD AUTO: 0.06 X10(3) UL (ref 0–0.7)
EOSINOPHIL NFR BLD AUTO: 0.5 %
ERYTHROCYTE [DISTWIDTH] IN BLOOD BY AUTOMATED COUNT: 12.8 %
EST. AVERAGE GLUCOSE BLD GHB EST-MCNC: 111 MG/DL (ref 68–126)
HBA1C MFR BLD: 5.5 % (ref ?–5.7)
HBV SURFACE AG SER-ACNC: <0.1 [IU]/L
HBV SURFACE AG SERPL QL IA: NONREACTIVE
HCT VFR BLD AUTO: 40.5 %
HCV AB SERPL QL IA: NONREACTIVE
HGB BLD-MCNC: 13.4 G/DL
HPV I/H RISK 1 DNA SPEC QL NAA+PROBE: POSITIVE
IMM GRANULOCYTES # BLD AUTO: 0.05 X10(3) UL (ref 0–1)
IMM GRANULOCYTES NFR BLD: 0.4 %
LYMPHOCYTES # BLD AUTO: 3.2 X10(3) UL (ref 1–4)
LYMPHOCYTES NFR BLD AUTO: 27 %
MCH RBC QN AUTO: 29.7 PG (ref 26–34)
MCHC RBC AUTO-ENTMCNC: 33.1 G/DL (ref 31–37)
MCV RBC AUTO: 89.8 FL
MONOCYTES # BLD AUTO: 0.72 X10(3) UL (ref 0.1–1)
MONOCYTES NFR BLD AUTO: 6.1 %
NEUTROPHILS # BLD AUTO: 7.77 X10 (3) UL (ref 1.5–7.7)
NEUTROPHILS # BLD AUTO: 7.77 X10(3) UL (ref 1.5–7.7)
NEUTROPHILS NFR BLD AUTO: 65.7 %
PLATELET # BLD AUTO: 258 10(3)UL (ref 150–450)
PROGEST SERPL-MCNC: 10.3 NG/ML
RBC # BLD AUTO: 4.51 X10(6)UL
RH BLOOD TYPE: POSITIVE
RUBV IGG SER QL: POSITIVE
RUBV IGG SER-ACNC: 35.7 IU/ML (ref 10–?)
T PALLIDUM AB SER QL IA: NONREACTIVE
WBC # BLD AUTO: 11.8 X10(3) UL (ref 4–11)

## 2023-11-29 PROCEDURE — 86762 RUBELLA ANTIBODY: CPT

## 2023-11-29 PROCEDURE — 86900 BLOOD TYPING SEROLOGIC ABO: CPT

## 2023-11-29 PROCEDURE — 86803 HEPATITIS C AB TEST: CPT

## 2023-11-29 PROCEDURE — 84144 ASSAY OF PROGESTERONE: CPT

## 2023-11-29 PROCEDURE — 86850 RBC ANTIBODY SCREEN: CPT

## 2023-11-29 PROCEDURE — 87340 HEPATITIS B SURFACE AG IA: CPT

## 2023-11-29 PROCEDURE — 86780 TREPONEMA PALLIDUM: CPT

## 2023-11-29 PROCEDURE — 83036 HEMOGLOBIN GLYCOSYLATED A1C: CPT

## 2023-11-29 PROCEDURE — 86901 BLOOD TYPING SEROLOGIC RH(D): CPT

## 2023-11-29 PROCEDURE — 85025 COMPLETE CBC W/AUTO DIFF WBC: CPT

## 2023-11-29 PROCEDURE — 87389 HIV-1 AG W/HIV-1&-2 AB AG IA: CPT

## 2023-11-29 PROCEDURE — 36415 COLL VENOUS BLD VENIPUNCTURE: CPT

## 2023-11-30 LAB
.: NORMAL
.: NORMAL
HPV16 DNA CVX QL PROBE+SIG AMP: NEGATIVE
HPV18 DNA CVX QL PROBE+SIG AMP: POSITIVE

## 2023-11-30 RX ORDER — PROGESTERONE 200 MG/1
200 CAPSULE ORAL NIGHTLY
Qty: 30 CAPSULE | Refills: 0 | Status: SHIPPED | OUTPATIENT
Start: 2023-11-30 | End: 2023-12-30

## 2023-11-30 NOTE — TELEPHONE ENCOUNTER
Per OB notes, pt informed provider she has stopped Qulipta.    Sent pt a [employer] message that provider can complete NBI and TPI of approved by OB.    Informed pt that Bupivicaine and Kenalog will be used and to inform

## 2023-11-30 NOTE — TELEPHONE ENCOUNTER
Called [facility] [phone] and spoke with Lara.    NBI and TPI have been approved from 11/29/23-12/31/23 CPT code 18169,69360,11716 in office

## 2023-11-30 NOTE — PROGRESS NOTES
Pt aware o results & recommendations for vaginal progesterone at bedtime until 12 weeks. Verbalized understanding.

## 2023-11-30 NOTE — TELEPHONE ENCOUNTER
She needs to STOP Qulipta as this has not been evaluated for safety during pregnancy. I may be able to do the TPIss and/or occipital nerve block but this must be cleared by OB prior to me attempting them. I use Bupivacaine which should be ok but also sometimes kenalog which would both need to be okayed by her OB prior to any attempted TPI or block

## 2023-12-01 ENCOUNTER — TELEPHONE (OUTPATIENT)
Facility: CLINIC | Age: 33
End: 2023-12-01

## 2023-12-01 NOTE — PROGRESS NOTES
Pt aware of results & recommendations for a colpo. Scheduled 12/22/23 with Dr. Nicky Cooper. Verbalized understanding.

## 2023-12-04 RX ORDER — VENLAFAXINE HYDROCHLORIDE 37.5 MG/1
37.5 CAPSULE, EXTENDED RELEASE ORAL DAILY
Qty: 21 CAPSULE | Refills: 0 | Status: SHIPPED | OUTPATIENT
Start: 2023-12-04

## 2023-12-04 NOTE — TELEPHONE ENCOUNTER
.A refill request was received for:  Requested Prescriptions     Pending Prescriptions Disp Refills    VENLAFAXINE ER 37.5 MG Oral Capsule SR 24 Hr [Pharmacy Med Name: VENLAFAXINE HCL ER 37.5 MG CAP] 21 capsule 0     Sig: TAKE 1 CAPSULE BY MOUTH DAILY       Last refill date:   11/14/2023    Last office visit: 11/14/2023    Follow up due:  Future Appointments   Date Time Provider Elizabeth Babb   12/7/2023  3:30 PM Derek Vera [facility]   12/22/2023  2:00 PM Gurjit Rock DO [facility]   12/26/2023  2:00 PM Madyson Nicolas MD [facility]   2/6/2024  2:00 PM DO MARIBEL Farfan [id number]

## 2023-12-07 ENCOUNTER — OFFICE VISIT (OUTPATIENT)
Dept: PAIN CLINIC | Facility: CLINIC | Age: 33
End: 2023-12-07
Payer: MEDICAID

## 2023-12-07 VITALS
SYSTOLIC BLOOD PRESSURE: 126 MMHG | WEIGHT: 212 LBS | HEART RATE: 87 BPM | DIASTOLIC BLOOD PRESSURE: 86 MMHG | BODY MASS INDEX: 36 KG/M2 | OXYGEN SATURATION: 98 %

## 2023-12-07 DIAGNOSIS — M54.81 BILATERAL OCCIPITAL NEURALGIA: Primary | ICD-10-CM

## 2023-12-07 PROCEDURE — 3074F SYST BP LT 130 MM HG: CPT | Performed by: PHYSICIAN ASSISTANT

## 2023-12-07 PROCEDURE — 3079F DIAST BP 80-89 MM HG: CPT | Performed by: PHYSICIAN ASSISTANT

## 2023-12-07 PROCEDURE — 99204 OFFICE O/P NEW MOD 45 MIN: CPT | Performed by: PHYSICIAN ASSISTANT

## 2023-12-07 NOTE — PROGRESS NOTES
Physical Exam  Constitutional:            Location of Pain: Head, both sides when HA is dull, one-sided when migraine is intense    Date Pain Began: 2001          Work Related:   No        Receiving Work Comp/Disability:   No    Numeric Rating Scale:  Pain at Present:  3                                                                                                            (No Pain) 0  to  10 (Worst Pain)                 Minimum Pain:   2  Maximum Pain  9    Distribution of Pain:    bilateral    Quality of Pain:   aching, sharp/stabbing, and throbbing    Origin of Pain:    Other Not sure    Aggravating Factors:    Standing and Walking    Past Treatments for Current Pain Condition:   Other facet joint inj, epidural, botox, meds    Prior diagnostic testing for your pain:  MRI          Carlos Guerra RN, 12/7/2023, 3:52 PM

## 2023-12-07 NOTE — PROGRESS NOTES
Patient presents in office today with reported pain in surrounding entire head. It is not as intense as it typically is, when it will usually pick a side. Current pain level reported = 3/10    Last reported dose of fioracet today. Pt has been avoiding -triptans. OBGYN: Dr. Prasanna Peck    Pt is currently 8 week pregnant. Here to discuss possible NB's.

## 2023-12-11 ENCOUNTER — TELEPHONE (OUTPATIENT)
Dept: PAIN CLINIC | Facility: CLINIC | Age: 33
End: 2023-12-11

## 2023-12-11 NOTE — TELEPHONE ENCOUNTER
Order Questions    Question Answer Comment   Anesthesia Type Local    Provider Gm Colon    Location Office    Procedure Other (please add comment) bilateral occipital block   CPT (Hit enter after each entry) INJECT NERV BLCK,GREAT OCCIPTL    Medical clearance requested (will send to Pain Navigator) No    Patient has Medicare coverage?  No    Comments (Please list entire procedure name here.) patient is pregnant, please obtain clearance from OB/Gyn

## 2023-12-11 NOTE — TELEPHONE ENCOUNTER
Prior authorization request completed for: bilateral occipital block  Authorization #[id number]  Authorization dates:  12/11/2023-2/9/2024  CPT codes approved: 53299  Number of visits/dates of service approved: 1  Physician: MARISOL  Location: OFFICE      Patient can be scheduled. Routed to Navigator.

## 2023-12-11 NOTE — TELEPHONE ENCOUNTER
Prior Authorization for bilateral occipital block   initiated with [address] [employer] [phone]  ([facility])  CPT codes: 47144  Request for injection pending review, yes  pending reference #[id number]  Clinical notes submitted via  faxed [phone]  confirmation received.  Yes

## 2023-12-11 NOTE — TELEPHONE ENCOUNTER
Medical Clearance faxed to Roberto Rosenberg at Fax #: [phone];DANIAL crawford'cvd     23    RE: Alethea Ann    : 1990    Dear Dr. Melvin Balderrama,    Your patient is being scheduled for a pain management procedure at [facility]. Procedure:  Bilateral Occipital Nerve Block   Date of Procedure: TBD -pending medical clearance. Physician: Noelle Petty- Anesthesiologist     Your patient is to be scheduled for Bilateral Occipital Nerve Block in our office. Please verify patient is cleared to proceed with pain management procedures.

## 2023-12-18 ENCOUNTER — TELEPHONE (OUTPATIENT)
Facility: CLINIC | Age: 33
End: 2023-12-18

## 2023-12-18 NOTE — TELEPHONE ENCOUNTER
Clearance form received from [facility] pain clinic for pt for bilateral occipital nerve block, form placed in HILARIO Borrero's desk for review.

## 2023-12-20 ENCOUNTER — PATIENT MESSAGE (OUTPATIENT)
Dept: FAMILY MEDICINE CLINIC | Facility: CLINIC | Age: 33
End: 2023-12-20

## 2023-12-20 NOTE — TELEPHONE ENCOUNTER
Tylenol would be the primary medication for her request, I would recommend hydration with something like gatorade or pedialyte. WE could consider ondansetron or zofran to help with nausea, it is a medicien that is considered 'probably' safe in pregnancy. Has been used for years, but some evidence came out moving us into  less certain territory.  I think a short course is reasonable but isn't free of risk    Danelle Fink MD

## 2023-12-20 NOTE — TELEPHONE ENCOUNTER
From: Ender Nielsen  To: Angel Andino  Sent: 12/20/2023 2:26 PM CST  Subject: Flu Symptoms     Good Afternoon,  I started to develop flu like symptoms last night. Chills, diarrhea, vomiting (which has been constant the last few weeks due to pregnancy), and a sore throat. I'm not sure what is safe to take in the event my fever goes up. Is there anything I can take to relieve any of the symptoms?

## 2023-12-21 ENCOUNTER — TELEPHONE (OUTPATIENT)
Dept: FAMILY MEDICINE CLINIC | Facility: CLINIC | Age: 33
End: 2023-12-21

## 2023-12-21 RX ORDER — ONDANSETRON 4 MG/1
4 TABLET, ORALLY DISINTEGRATING ORAL EVERY 8 HOURS PRN
Qty: 15 TABLET | Refills: 0 | Status: SHIPPED | OUTPATIENT
Start: 2023-12-21

## 2023-12-21 NOTE — TELEPHONE ENCOUNTER
Pt is 10 weeks pregnant and started having congestion yesterday and a fever.      Her temperature is currently 101.5  She took 500 mg of tylenol     Please advise

## 2023-12-21 NOTE — TELEPHONE ENCOUNTER
Pt is 10 weeks pregnant and started having congestion yesterday and a fever. Her temperature is currently 101.5  She took 500 mg of tylenol      Please advise       Spoke with patient advised on the guiedlines for OTC tylenol, do not take more than 3000mg in a day. Advised if fever goes over 103.0f go to ER/UC. Dr. Diane Macias, please see pt message, patient agrees to take zofran.

## 2023-12-26 ENCOUNTER — TELEPHONE (OUTPATIENT)
Facility: CLINIC | Age: 33
End: 2023-12-26

## 2023-12-26 ENCOUNTER — ROUTINE PRENATAL (OUTPATIENT)
Facility: CLINIC | Age: 33
End: 2023-12-26
Payer: MEDICAID

## 2023-12-26 VITALS
HEART RATE: 87 BPM | BODY MASS INDEX: 36.16 KG/M2 | HEIGHT: 64 IN | DIASTOLIC BLOOD PRESSURE: 67 MMHG | SYSTOLIC BLOOD PRESSURE: 116 MMHG | WEIGHT: 211.81 LBS

## 2023-12-26 DIAGNOSIS — O34.41 ABNORMAL CERVICAL PAPANICOLAOU SMEAR AFFECTING PREGNANCY IN FIRST TRIMESTER: ICD-10-CM

## 2023-12-26 DIAGNOSIS — O10.919 CHRONIC HYPERTENSION AFFECTING PREGNANCY: Primary | ICD-10-CM

## 2023-12-26 DIAGNOSIS — R51.9 HEADACHE IN PREGNANCY, ANTEPARTUM, FIRST TRIMESTER: ICD-10-CM

## 2023-12-26 DIAGNOSIS — Z36.0 ENCOUNTER FOR ANTENATAL SCREENING FOR CHROMOSOMAL ANOMALIES: ICD-10-CM

## 2023-12-26 DIAGNOSIS — O26.891 HEADACHE IN PREGNANCY, ANTEPARTUM, FIRST TRIMESTER: ICD-10-CM

## 2023-12-26 DIAGNOSIS — Z36.82 ENCOUNTER FOR (NT) NUCHAL TRANSLUCENCY SCAN: ICD-10-CM

## 2023-12-26 DIAGNOSIS — Z13.71 SCREENING FOR GENETIC DISEASE CARRIER STATUS: ICD-10-CM

## 2023-12-26 DIAGNOSIS — R87.619 ABNORMAL CERVICAL PAPANICOLAOU SMEAR AFFECTING PREGNANCY IN FIRST TRIMESTER: ICD-10-CM

## 2023-12-26 DIAGNOSIS — Z86.39 HISTORY OF VITAMIN D DEFICIENCY: ICD-10-CM

## 2023-12-26 DIAGNOSIS — R87.810 CERVICAL HIGH RISK HPV (HUMAN PAPILLOMAVIRUS) TEST POSITIVE: ICD-10-CM

## 2023-12-26 DIAGNOSIS — F41.9 ANXIETY DISORDER AFFECTING PREGNANCY, ANTEPARTUM: ICD-10-CM

## 2023-12-26 DIAGNOSIS — M50.30 DDD (DEGENERATIVE DISC DISEASE), CERVICAL: ICD-10-CM

## 2023-12-26 DIAGNOSIS — E66.09 OTHER OBESITY DUE TO EXCESS CALORIES AFFECTING PREGNANCY IN FIRST TRIMESTER: ICD-10-CM

## 2023-12-26 DIAGNOSIS — M54.81 BILATERAL OCCIPITAL NEURALGIA: ICD-10-CM

## 2023-12-26 DIAGNOSIS — M47.812 CERVICAL FACET SYNDROME: ICD-10-CM

## 2023-12-26 DIAGNOSIS — Z28.21 INFLUENZA VACCINATION DECLINED BY PATIENT: ICD-10-CM

## 2023-12-26 DIAGNOSIS — O21.9 NAUSEA AND VOMITING OF PREGNANCY, ANTEPARTUM: ICD-10-CM

## 2023-12-26 DIAGNOSIS — O99.340 ANXIETY DISORDER AFFECTING PREGNANCY, ANTEPARTUM: ICD-10-CM

## 2023-12-26 DIAGNOSIS — Z36.89 ENCOUNTER FOR FETAL ANATOMIC SURVEY: ICD-10-CM

## 2023-12-26 DIAGNOSIS — G43.711 CHRONIC MIGRAINE WITHOUT AURA, WITH INTRACTABLE MIGRAINE, SO STATED, WITH STATUS MIGRAINOSUS: ICD-10-CM

## 2023-12-26 DIAGNOSIS — O36.8390 UNABLE TO HEAR FETAL HEART TONES AS REASON FOR ULTRASOUND SCAN: ICD-10-CM

## 2023-12-26 DIAGNOSIS — O99.211 OTHER OBESITY DUE TO EXCESS CALORIES AFFECTING PREGNANCY IN FIRST TRIMESTER: ICD-10-CM

## 2023-12-26 LAB
CREAT UR-SCNC: 103 MG/DL
PROT UR-MCNC: 8.4 MG/DL

## 2023-12-26 PROCEDURE — 76815 OB US LIMITED FETUS(S): CPT | Performed by: OBSTETRICS & GYNECOLOGY

## 2023-12-26 PROCEDURE — 3008F BODY MASS INDEX DOCD: CPT | Performed by: OBSTETRICS & GYNECOLOGY

## 2023-12-26 PROCEDURE — 3078F DIAST BP <80 MM HG: CPT | Performed by: OBSTETRICS & GYNECOLOGY

## 2023-12-26 PROCEDURE — 84156 ASSAY OF PROTEIN URINE: CPT | Performed by: OBSTETRICS & GYNECOLOGY

## 2023-12-26 PROCEDURE — 3074F SYST BP LT 130 MM HG: CPT | Performed by: OBSTETRICS & GYNECOLOGY

## 2023-12-26 PROCEDURE — 82570 ASSAY OF URINE CREATININE: CPT | Performed by: OBSTETRICS & GYNECOLOGY

## 2023-12-26 PROCEDURE — 99214 OFFICE O/P EST MOD 30 MIN: CPT | Performed by: OBSTETRICS & GYNECOLOGY

## 2023-12-26 RX ORDER — FAMOTIDINE 20 MG/1
20 TABLET, FILM COATED ORAL 2 TIMES DAILY
Qty: 60 TABLET | Refills: 5 | Status: SHIPPED | OUTPATIENT
Start: 2023-12-26

## 2023-12-26 RX ORDER — PROMETHAZINE HYDROCHLORIDE 12.5 MG/1
12.5 SUPPOSITORY RECTAL EVERY 6 HOURS PRN
Qty: 10 SUPPOSITORY | Refills: 1 | Status: SHIPPED | OUTPATIENT
Start: 2023-12-26

## 2023-12-26 NOTE — PROGRESS NOTES
KYLEIGH    Partner here     Daily HA. Waxing & waning. No vision changes. Nausea - Zofran was prescribed. Vomiting - 3-4 times per day. Ginger ale helps   Worse with activity   Lots of bloating   Some lateral pulling/cramping for 10 seconds or so. Passing. No vaginal bleeding. Vitals:    23 1357   BP: 116/67   Pulse: 87   Weight: 211 lb 12.8 oz (96.1 kg)   Height: 64\"      2023   Limited OB US - transabdominal   Indication: too early to doppler FHT at 10w4d     Single live IUP with  bpm  Fetal movement noted  Normal amniotic fluid    29year old  at 10w4d   FRANCIS 24 by Humberto Franklin does not correlate with LMP   O+    Dating  -FRANCIS 24 by Humberto Franklin not c/w LMP  -patient reports periods are regular (per EMR h/o menometrorrhagia noted in 2018)    -NIPS - desires   -NT advised & ordered.    -carrier screening - desires   -AFP information given. Ask at next   -Flu  - declines   - COVID-19 booster encouraged     Chronic HTN  -22 /96 (pre-pregnancy office visit)   -23 /90 (pre-pregnancy office visit)   -10/30/23 /88 (very early 1st trim office visit)   -1st OB visit - 6w3d /97, repeat 120/77   -BP cuff at home - does have one at home. Encouraged to start daily & log it   -aspirin recommended  -early consult & NT US with MFM   -EKG baseline - ordered   -baseline CMP, TSH, urine P/C ordered. -L2 US advised & ordered   -growth US  -NSTs at 32 wk     Obesity (pre preg BMI 36)   -limit weight gain 11-20 lb   -Early 1 hr GTT not done yet. Reordered & reminded    -L2US, growth US, NSTs per above    H/o intractable migraines  -onset age 9.  21 or more migraines per month. -only 4-5 hours of sleep on regular basis   -MRI C spine  - Mild multilevel disc desiccation and bulging without significant loss of intervertebral disc space height. No significant spinal canal or neuroforaminal stenosis. -MRI brain 21 - No acute intracranial abnormality.    -MRI c spine 8/21 - unremarkable. Done for left-sided facial and arm   numbness and radiculopathy.   -Past treatments: Botox, Nurtec, Aimovig, Ajovy, Amitriptyline, nortriptyline, propranolol, Depakote, Topamax, venlafaxine, Norco, Fioricet, multiple triptans, propranolol, flexeril, and OTC analgesics. She has had facet and cervical epidurals as well as SPG block none of which provided much relief. Osteopathic manipulation therapy, PT, chiropractic, massage acupuncture, magnesium, CoQ10, vitamin B complex, elimination diet.   -Botox helped partially in the past   -Neurologist Dr. Nola Riedel - currently taking Naratriptan 2.5 mg PRN  -Took Qulipta x 1 week & seemed to be working - stopped when found out she is pregnant.   -per patient, when she notified Dr. Sita Santiago she is pregnant, he is no longer comfortable managing her migraine medication.   -Pain management 12/7/23 bilateral occipital neuralgia. Migraines originate from the occipital region, L>R. Bilateral occipital block, pending insurance approval and OBGyn approval.  Follow-up 2 to 3 weeks thereafter.   -scheduled for nerve blocks on 1/10/23. CHEYANNE, insomnia  -meds: none currently. H/o zoloft, clonazepam, venlafaxine   -therapist - not currently. Not very interested. Resources given     H/o PVCs   -Fhx Brugada syndrome - she tested negative   -3/31/2021 TTE - LVEF 55-60%. Unremarkable  -Holter monitor evaluation 4/2021 - was told her PVCs are minor and offered her medications if she wanted it - she declined - feeling fine. NVP  -Rx pepcid 20 mg PO BID & phenergan suppositories  -pt trying to avoid zofran due to concern for cat C, possible increased risk for fetal heart defect   -discussed consideration of Reglan, vit B6, doxylamine, jennifer    Pap neg, +HPV 18/45 (11/27/23)  -colposcopy recommended. Needs to schedule.      RTC 4 wk

## 2023-12-26 NOTE — PATIENT INSTRUCTIONS
Vitamin B6  Doxylamine (Unisom) or can try benadryl  Jenna    Do early 1 hr glucose test - non fasting. Aspirin in pregnancy  -81 mg tablet - take 1.5 or 2 tablets per day. Start at 12-13 weeks   -may help prevent  pre-eclampsia by helping with placental development and function  -may discontinue at term (37 wk)      Call MFM to get scheduled for early consultation & NT ultrasound. BP cuff for home. Start taking BP daily & keep log. Bring to visits. Magnesium supplementation for headache prevention  Different choices based on goals:    Oral route:  Magnesium glycinate 250-500 mg nightly - harder to find, more expensive, better absorbed, fewer GI side effects  Alternatives: magnesium chloride, magnesium sulfate. \    Do NOT recommend magnesium citrate - this is a laxative. Do not recommend taking at same time as prenatal vitamin (calcium in prenatal vitamin competes with magnesium for absorption)    Non-oral route:   Epsom salt soaks (baths or foot baths - you can absorb magnesium through the skin)   Magnesium lotions/body sprays. AFP Level   There is an optional blood test that we can perform on you called \"AFP level. \" It is a chemical in the bloodstream produced by the pregnancy. It is done between 15-20 weeks gestation. The ideal time for testing is actually 16-18 weeks gestation. If the level is abnormally elevated, this can indicate the presence of abnormalities of the development of the brain and spinal cord such as anencephaly (lack of brain development) and spina bifida (exposed spinal cord). These anomalies can generally be seen on ultrasound. It can also be elevated in cases of normal fetal anatomy and can indicate an abnormal placenta. This may or may not be able to be detected on ultrasound. Please think about whether or not you would like to have this test done. When you decide when you would like to have this test done, please let us know.  We must enter your exact gestational age and weight on the date of the blood draw for the test to be calculated accurately. Maternal Fetal Medicine (MFM)  Hunter Potter   [facility]  [address], [address]    [address] [facility]. [place], [address] [facility], [address] [place] [address], [address] [place] [address]  Phone: [phone]    Family Counseling [facility]  [address] [place], [address] [facility] [address]   [address] [facility] [address] [facility]     [facility]  [facility] [address], [address] [facility]  [phone]     [facility]  (multiple locations in the area)  [phone]

## 2023-12-27 NOTE — TELEPHONE ENCOUNTER
Please schedule patient for colposcopy. Looks like was scheduled 12/22/23 with BREN but was sick so had to cancel. She is 10w4d currently. Best done separate from return OB visit.

## 2024-01-02 ENCOUNTER — NURSE ONLY (OUTPATIENT)
Facility: CLINIC | Age: 34
End: 2024-01-02
Payer: MEDICAID

## 2024-01-02 ENCOUNTER — TELEPHONE (OUTPATIENT)
Facility: CLINIC | Age: 34
End: 2024-01-02

## 2024-01-02 VITALS
DIASTOLIC BLOOD PRESSURE: 68 MMHG | BODY MASS INDEX: 36.02 KG/M2 | WEIGHT: 211 LBS | HEIGHT: 64 IN | HEART RATE: 90 BPM | SYSTOLIC BLOOD PRESSURE: 110 MMHG

## 2024-01-02 RX ORDER — VENLAFAXINE HYDROCHLORIDE 37.5 MG/1
37.5 CAPSULE, EXTENDED RELEASE ORAL DAILY
Qty: 21 CAPSULE | Refills: 0 | Status: SHIPPED | OUTPATIENT
Start: 2024-01-02 | End: 2024-01-02

## 2024-01-02 NOTE — PROGRESS NOTES
11   dillon pregnancy     Pt request for NIPS/carrier lab draw per Dr. Casanova    Patients name &  verified on lab tubes with patient. NIPS/carrier screen lab drawn, patient tolerated well. Specimen placed at . [facility] access, call in 2/4 weeks for result, Cautioned patient may receive results via email from [facility] that includes gender results discussed. Patient verbalized understanding.

## 2024-01-02 NOTE — TELEPHONE ENCOUNTER
A refill request was received for:  Requested Prescriptions     Pending Prescriptions Disp Refills    VENLAFAXINE ER 37.5 MG Oral Capsule SR 24 Hr [Pharmacy Med Name: VENLAFAXINE HCL ER 37.5 MG CAP] 21 capsule 0     Sig: TAKE 1 CAPSULE BY MOUTH DAILY       Last refill date:   12/4/2023    Last office visit: 11/14/2023    Follow up due:  Future Appointments   Date Time Provider Department Center   1/2/2024  4:30 PM [facility] MOB NURSE [facility]   1/10/2024 11:30 AM [facility]O PAIN NURSE [facility]   1/10/2024 11:45 AM Guy Barnes MD [facility] [place]   1/11/2024  3:30 PM [id number]   1/24/2024  3:15 PM Herminia Casanova MD [facility] [place]   2/6/2024  2:00 PM Robe Gutierrez DO [facility] [id number]

## 2024-01-06 ENCOUNTER — LAB ENCOUNTER (OUTPATIENT)
Dept: LAB | Facility: HOSPITAL | Age: 34
End: 2024-01-06
Payer: MEDICAID

## 2024-01-06 DIAGNOSIS — O99.211 OTHER OBESITY DUE TO EXCESS CALORIES AFFECTING PREGNANCY IN FIRST TRIMESTER: ICD-10-CM

## 2024-01-06 DIAGNOSIS — Z86.39 HISTORY OF VITAMIN D DEFICIENCY: ICD-10-CM

## 2024-01-06 DIAGNOSIS — E66.09 OTHER OBESITY DUE TO EXCESS CALORIES AFFECTING PREGNANCY IN FIRST TRIMESTER: ICD-10-CM

## 2024-01-06 LAB
ALBUMIN SERPL-MCNC: 3.1 G/DL (ref 3.4–5)
ALBUMIN/GLOB SERPL: 0.8 {RATIO} (ref 1–2)
ALP LIVER SERPL-CCNC: 81 U/L
ALT SERPL-CCNC: 15 U/L
AMB EXT MYRIAD TRISOMY 13: NEGATIVE
AMB EXT MYRIAD TRISOMY 18: NEGATIVE
AMB EXT MYRIAD TRISOMY 21: NEGATIVE
ANION GAP SERPL CALC-SCNC: 6 MMOL/L (ref 0–18)
AST SERPL-CCNC: 6 U/L (ref 15–37)
BILIRUB SERPL-MCNC: 0.2 MG/DL (ref 0.1–2)
BUN BLD-MCNC: 6 MG/DL (ref 9–23)
CALCIUM BLD-MCNC: 9.2 MG/DL (ref 8.5–10.1)
CHLORIDE SERPL-SCNC: 107 MMOL/L (ref 98–112)
CO2 SERPL-SCNC: 25 MMOL/L (ref 21–32)
CREAT BLD-MCNC: 0.5 MG/DL
EGFRCR SERPLBLD CKD-EPI 2021: 128 ML/MIN/1.73M2 (ref 60–?)
FASTING STATUS PATIENT QL REPORTED: YES
GLOBULIN PLAS-MCNC: 4.1 G/DL (ref 2.8–4.4)
GLUCOSE BLD-MCNC: 87 MG/DL (ref 70–99)
OSMOLALITY SERPL CALC.SUM OF ELEC: 283 MOSM/KG (ref 275–295)
POTASSIUM SERPL-SCNC: 3.9 MMOL/L (ref 3.5–5.1)
PROT SERPL-MCNC: 7.2 G/DL (ref 6.4–8.2)
SODIUM SERPL-SCNC: 138 MMOL/L (ref 136–145)
T3FREE SERPL-MCNC: 3.34 PG/ML (ref 2.4–4.2)
T4 FREE SERPL-MCNC: 1.1 NG/DL (ref 0.8–1.7)
TSI SER-ACNC: 0.23 MIU/ML (ref 0.36–3.74)
VIT D+METAB SERPL-MCNC: 12.8 NG/ML (ref 30–100)

## 2024-01-06 PROCEDURE — 82306 VITAMIN D 25 HYDROXY: CPT

## 2024-01-06 PROCEDURE — 80053 COMPREHEN METABOLIC PANEL: CPT

## 2024-01-06 PROCEDURE — 36415 COLL VENOUS BLD VENIPUNCTURE: CPT

## 2024-01-06 PROCEDURE — 84443 ASSAY THYROID STIM HORMONE: CPT

## 2024-01-06 PROCEDURE — 84481 FREE ASSAY (FT-3): CPT

## 2024-01-06 PROCEDURE — 84439 ASSAY OF FREE THYROXINE: CPT

## 2024-01-07 ENCOUNTER — TELEPHONE (OUTPATIENT)
Facility: CLINIC | Age: 34
End: 2024-01-07

## 2024-01-09 ENCOUNTER — ULTRASOUND ENCOUNTER (OUTPATIENT)
Dept: PERINATAL CARE | Facility: HOSPITAL | Age: 34
End: 2024-01-09
Attending: OBSTETRICS & GYNECOLOGY
Payer: MEDICAID

## 2024-01-09 VITALS
DIASTOLIC BLOOD PRESSURE: 88 MMHG | HEIGHT: 64 IN | SYSTOLIC BLOOD PRESSURE: 137 MMHG | WEIGHT: 214 LBS | BODY MASS INDEX: 36.54 KG/M2 | HEART RATE: 86 BPM

## 2024-01-09 DIAGNOSIS — O99.211 OTHER OBESITY DUE TO EXCESS CALORIES AFFECTING PREGNANCY IN FIRST TRIMESTER: ICD-10-CM

## 2024-01-09 DIAGNOSIS — G43.711 CHRONIC MIGRAINE WITHOUT AURA, WITH INTRACTABLE MIGRAINE, SO STATED, WITH STATUS MIGRAINOSUS: ICD-10-CM

## 2024-01-09 DIAGNOSIS — O10.919 CHRONIC HYPERTENSION AFFECTING PREGNANCY: ICD-10-CM

## 2024-01-09 DIAGNOSIS — O99.211 OBESITY AFFECTING PREGNANCY IN FIRST TRIMESTER: ICD-10-CM

## 2024-01-09 DIAGNOSIS — F41.9 ANXIETY DISORDER AFFECTING PREGNANCY, ANTEPARTUM: ICD-10-CM

## 2024-01-09 DIAGNOSIS — Z36.82 ENCOUNTER FOR (NT) NUCHAL TRANSLUCENCY SCAN: ICD-10-CM

## 2024-01-09 DIAGNOSIS — O99.210 OBESITY AFFECTING PREGNANCY: Primary | ICD-10-CM

## 2024-01-09 DIAGNOSIS — E66.09 OTHER OBESITY DUE TO EXCESS CALORIES AFFECTING PREGNANCY IN FIRST TRIMESTER: ICD-10-CM

## 2024-01-09 DIAGNOSIS — G43.E19 INTRACTABLE CHRONIC MIGRAINE WITH AURA AND WITHOUT STATUS MIGRAINOSUS: ICD-10-CM

## 2024-01-09 DIAGNOSIS — O99.340 ANXIETY DISORDER AFFECTING PREGNANCY, ANTEPARTUM: ICD-10-CM

## 2024-01-09 DIAGNOSIS — O99.210 OBESITY AFFECTING PREGNANCY: ICD-10-CM

## 2024-01-09 LAB — AMB EXT MYRIAD FORESIGHT: NEGATIVE

## 2024-01-09 PROCEDURE — 76813 OB US NUCHAL MEAS 1 GEST: CPT | Performed by: OBSTETRICS & GYNECOLOGY

## 2024-01-09 NOTE — PROGRESS NOTES
Outpatient Maternal-Fetal Medicine Consultation    Dear Dr. Casanova,    Thank you for requesting ultrasound evaluation and maternal fetal medicine consultation on your patient Tammy Ramirez.  As you are aware she is a 33 year old female with a Meek pregnancy at 12w4d.  A maternal-fetal medicine consultation was requested secondary to class II obesity, chronic migraines, and hypertension associated with migraines.  Her prenatal records and labs were reviewed.    HISTORY  OB History    Para Term  AB Living   1 0 0 0 0 0   SAB IAB Ectopic Multiple Live Births   0 0 0 0 0     # 1 - Date: None, Sex: None, Weight: None, GA: None, Delivery: None, Apgar1: None, Apgar5: None, Living: None, Birth Comments: None    Past Medical History  The patient  has a past medical history of Anxiety (), Bilateral occipital neuralgia (2023), Cervical facet syndrome (2021), Cervical high risk HPV (human papillomavirus) test positive (2023), Chronic hypertension (2022), Chronic migraine without aura, with intractable migraine, so stated, with status migrainosus (2021), DDD (degenerative disc disease), cervical (2021), Dysmenorrhea (), CHEYANNE (generalized anxiety disorder) (2021), History of echocardiogram (2021), Insomnia (2021), Meningitis, Menometrorrhagia (), Migraine (2021), Obesity (BMI 30-39.9) (), and Palpitations (2020).    Past Surgical History  The patient  has a past surgical history that includes tonsillectomy; Lumbar Puncture/Spinal Tap(bedside); and other surgical history (2021).    Family History  The patient She indicated that her mother is alive. She indicated that her father is .      Medications:   Current Outpatient Medications:     famotidine 20 MG Oral Tab, Take 1 tablet (20 mg total) by mouth 2 (two) times daily., Disp: 60 tablet, Rfl: 5    Prenatal MV-Min-Fe Fum-FA-DHA (PRENATAL 1 OR), Take by mouth., Disp: , Rfl:      butalbital-acetaminophen-caffeine -40 MG Oral Tab, Take 1 tablet by mouth every 6 (six) hours as needed for Pain., Disp: 30 tablet, Rfl: 1    Naratriptan HCl 2.5 MG Oral Tab, Take 1 tablet (2.5 mg total) by mouth as needed for Migraine., Disp: 21 tablet, Rfl: 3  Allergies:   Allergies   Allergen Reactions    Droperidol ANXIETY, RESTLESSNESS and Tightness in Chest         PHYSICAL EXAMINATION:  /88 (BP Location: Right arm, Patient Position: Sitting, Cuff Size: large)   Pulse 86   Ht 5' 4\" (1.626 m)   Wt 214 lb (97.1 kg)   LMP 2023 (Exact Date)   BMI 36.73 kg/m²   General: alert and oriented,no acute distress  Abdomen: gravid, soft, non-tender  Extremities: non-tender, no edema      OBSTETRIC ULTRASOUND  The patient had a first trimester ultrasound today which I interpreted the results and reviewed them with the patient.    Single IUP with cardiac activity 151 bpm  Amniotic fluid is normal.  Placenta location is anterior  The CRL is consistent with gestational age. Nasal bone present. The nuchal translucency measures 1.72 mm. This is within normal limits.   The uterus appears normal.  The ovaries were not visualized but no adnexal masses were seen.    See imaging tab for the complete US report.    DISCUSSION  During her visit we discussed and reviewed the following issues:  OBESITY:  Her BMI prior to pregnancy was 36  Obesity during pregnancy is associated with numerous maternal and  risks.  It is not clear whether obesity is a direct cause of adverse pregnancy outcome or whether the association between obesity and adverse pregnancy outcome is due to factors such as diabetes mellitus.   Data suggest that obese women should be encouraged to undertake a weight reduction program (diet, exercise, behavior modification, and possibly bariatric surgery in some cases) prior to attempting to conceive.            Subfertility in obese women is most commonly related to ovulatory dysfunction, and,  in some obese women, the ovulatory dysfunction is related to polycystic ovary syndrome (PCOS). It is also important to note that even among ovulatory women, increasing obesity is associated with decreasing spontaneous pregnancy rates.  The increased risk of miscarriage in obese women may be because such women often have PCOS or isolated insulin resistance.                 Due to its strong association with obesity in the general population, type 2 diabetes mellitus is one of the two most common medical complications of the obese . The increased risk of type 2 diabetes is primarily related to an exaggerated increase in insulin resistance in the obese state. It is reasonable to screen obese gravidas for undiagnosed pregestational diabetes in the first trimester.   Glucose intolerance associated with gestational diabetes generally resolves postpartum; however, obese women with a history of gestational diabetes have a two-fold increased prevalence of subsequent type 2 diabetes.           An association between obesity and hypertensive disorders during pregnancy has been consistently reported.  In particular, maternal weight and BMI are independent risk factors for preeclampsia.             Studies have found that the increased risk of  birth in obese gravidas is primarily associated with obesity-related medical and  complications, rather than an intrinsic predisposition to spontaneous  birth. Prevention of  birth in these patients, therefore, should be directed toward prevention or management of medical and obstetrical complications.               Both prepregnancy obesity and excessive maternal weight gain before or during pregnancy contribute to an increased probability of  delivery.  It has also been hypothesized that obesity may lead to dystocia due to increased soft tissue deposition in the maternal pelvis.    delivery in the obese  is associated with  numerous perioperative concerns, including emergency delivery, prolonged incision to delivery interval, blood loss >1000 mL, longer operative times, wound infection, thromboembolism, and endometritis.            Maternal obesity appears to be associated with a small increase in the absolute rate of some congenital anomalies (primarily neural tube defect and cardiac), and the risk may increase with increasing maternal weight.  Level II ultrasound is advised for women with obesity.  The risk of neural tube defects increased significantly with maternal weight.    The analysis found that overweight and obese pregnant women experienced significantly more stillbirths than normal weight women.  Third trimester  testing is advised.    We discussed the current recommendations for limited gestational weight gain in pregnancy for overweight and obese women.  The [employer] currently recommends that women keep gestational weight gain to between 8-18 lbs.  We discussed the role of mild to moderate exercise, healthy food choices and appropriate portions sized to help achieve this goal.  Excess weight gain is associated with higher rates of gestational diabetes, hypertensive complications, fetal macrosomia and delivery complications.  Women with weight loss or insufficient weight gain have higher rates of small for gestational age infants.    A recent study found that initiating moderate exercise in early pregnancy for obese gravidas significantly reduced the incidence of gestational diabetes, gestational hypertension,  deliveries and C-sections.  In the study, women were assigned to riding a stationary cycle for  30 minutes 3 times per week, keeping HR<140 bpm.  I encouraged Tammy to begin moderate exercise such as walking or stationary bike in the pregnancy.    Hypertension  vs pain reaction -   She does not have a hypertension other than when she has presented for severe pain from migraine.  Once  she receives medications to reduce the pain of her migraines, she reports that her blood pressure comes down to normal.  She has never been placed on medications to control blood pressure.    Prevention of Preeclampsia    Antiplatelet Agents  The observation that preeclampsia is associated with increased platelet turnover and increased platelet-derived thromboxane levels led to randomized trials evaluating low-dose aspirin therapy in women thought to be at increased risk of the disease. As opposed to higher dose aspirin therapy, low-dose aspirin (60 to 150 mg per day) diminishes platelet thromboxane synthesis while maintaining vascular wall prostacyclin synthesis. Although not well studied, the beneficial effect of low-dose aspirin for prevention of preeclampsia may also be in part related to modulation of inflammation. The drug has been studied for both prevention of preeclampsia and prevention of progression from mild to severe disease. It appears to result in a modest reduction in risk of preeclampsia when given to women at moderate to high risk of preeclampsia.  This approach has been studied in over 35,000 women, for both prevention of preeclampsia and prevention of progression of the disease. Low dose aspirin has a modest impact on pregnancy outcome; it reduces the risk of preeclampsia, as well as other adverse pregnancy outcomes (eg,  delivery, fetal growth restriction) by about 10 to 20 percent. Low dose aspirin is safe in pregnancy; thus, it is a reasonable strategy in women with a moderate to high risk of preeclampsia in whom the benefits outweigh the risks.     Clinical Guidelines  Based on the available data, low-dose aspirin is advised for women at high risk for preeclampsia. There is no consensus on the criteria that confer high risk. The [place] Preventive Services Task Force (USPSTF) risk criteria are reasonable.  USPSTF criteria for high risk include one or more of the following:    Previous pregnancy with preeclampsia, especially early onset and with an adverse outcome   Multifetal gestation  Chronic hypertension   Type 1 or 2 diabetes mellitus  Chronic kidney disease  Autoimmune disease (antiphospholipid syndrome, systemic lupus erythematosus)     Women with multiple moderate risk factors for preeclampsia are considered high risk, as well. Identification of women with an appropriate combination of moderate risk factors to be considered high risk is subjective and determined case by case, as the data describing the magnitude of the association between each of these risk factors and development of preeclampsia vary widely and lack consistency. USPSTF criteria for moderate risk include:  Nulliparity  Obesity (body mass index >30 kg/m2)  Family history of preeclampsia in mother or sister  Age ?35 years  Sociodemographic characteristics (, low socioeconomic level)  Personal risk factors (eg, history of low birth weight or small for gestational age, previous adverse pregnancy outcome, >10 year pregnancy interval)     In light of her risk for developing preeclampsia, I advised that Desiree begin taking low-dose aspirin now and continue until 37 weeks.  We compared and contrasted 81 mg 462 mg (2 tablets).  I advised that she begin taking 2 tablets once daily.    HEADACHES IN PREGNANCY  History  The patient has had chronic migraines since age 11.  Her headaches used to be aborted by sumatriptan hand but that stopped working several years ago.  She has been trialed on many different things without success.  She is currently working with pain management who is going to do some sort of a occipital nerve block to try to help control her migraines.  She was taking a newer medication called Qulipta about 1 to 2 weeks before she realized she was pregnant.  This medication showed promise.  Her neurologist had her stop the medication as that does not have any data in  pregnancy.    Discussion  Headaches occur in over 80 percent of women during their childbearing years, thus they often present during pregnancy. The hormonal changes accompanying the menstrual cycle, pregnancy, and the postpartum period are thought to be responsible for many headaches in women of reproductive age.  More than 90 percent of these headaches are either migraine or tension-type headaches, both of which are typically more severe in women than in men.  No large trials of headache therapy in pregnant women are available to provide data on which to base therapeutic recommendations. The major consideration is to \"do no harm\" since a history of headaches does not appear to adversely affect pregnancy outcome. Treatment decisions are made according to recommendations for nonpregnant adults with avoidance of drugs that are considered teratogenic or otherwise known to be harmful in pregnancy.  Medication overuse headaches may be confused with migraine (and can develop in patients with migraine).  The diagnosis should be suspected in patients who have persistent daily headaches despite the regular use of analgesic medications. No one analgesic is consistently identified as causative.    Medications For Headache In Pregnancy  High doses of caffeine in early pregnancy have been associated with an increased risk of miscarriage, and prolonged use of barbiturates or codeine near term can cause  withdrawal, but none of these drugs are associated with an increased risk of congenital anomalies . Prolonged use of barbiturates can also cause vitamin K responsive bleeding in the .   Ergotamine  is ABSOLUTELY CONTRAINDICATED during pregnancy because of the potential to induce hypertonic uterine contractions and vasoconstriction, which could cause adverse fetal effects.   Glucocorticoids  may be useful in refractory cases.  Consultation with a neurologist familiar with all medical modalities for treatment of  migraine in pregnancy may be helpful.    Opioids are a third-tier option, but should not be used on a chronic basis since they are habit-forming and can contribute to the development of medication overuse and chronic daily headaches. All opiates have potential for maternal addiction and  withdrawal.   Opiate-dependent pregnant women experience significant increases in obstetrical complications, such as Preeclampsia, Third trimester bleeding, Malpresentation, Nonreassuring fetal status, Passage of meconium, Low birth weight,  mortality and puerperal morbidity.   Opioid analgesics are considered FDA risk category D if used for prolonged periods or in large doses near term.  problems consist of complications associated with prematurity, opiate withdrawal,  growth deficiency, microcephaly, neurobehavioral deficits, and sudden infant death syndrome. It is difficult to establish the extent to which these problems are due to opiates versus coexistent maternal medical, nutritional, psychological, and socioeconomic difficulties.   Respiratory depression may be observed in the  if opioids are given close to delivery.   Selective serotonin agonists that are highly effective in treating migraine headaches. They selectively vasoconstrict brain vessels, but there is a theoretic possibility of vasoconstriction of uteroplacental vessels and increased uterotonic activity. Human experience with sumatriptan exposure during pregnancy has been reassuring.    Recommendation:   I suggested that the patient continue to follow-up with pain management.  She can continue use of the naratriptan since she is outside the first trimester     We discussed the recommended plan of care based on her  risk factors.  Tammy had her questions answered to her satisfaction.      IMPRESSION:  IUP at 12w4d  Normal first trimester ultrasound and NT measurement  Class II obesity complicating  pregnancy  Chronic migraines  Hypertension related to severe migraine    RECOMMENDATIONS:  Continue care with Dr. Casanova  Low-dose aspirin, 2 tablets (162 mg total daily)  Limit gestational weight gain to 20 pounds or less  Level II ultrasound 20 weeks  Growth and BPP ultrasound at 32 weeks  Weekly NSTs at 36 weeks  She is to continue to follow with the pain management team for her migraines      Total time spent 55 minutes this calendar day which includes preparing to see the patient including chart review, obtaining and/or reviewing additional medical history, performing a physical exam and evaluation, documenting clinical information in the electronic medical record, independently interpreting results, counseling the patient, communicating results to the patient/family/caregiver and coordinating care.     Case discussed with patient who demonstrated understanding and agreement with plan.     Thank you for allowing me to participate in the care of this patient.  Please feel free to contact me with any questions.    Idalia Rosado MD  Maternal-Fetal Medicine       Note to patient and family:  The 21st Century Cures Act makes medical notes available to patients in the interest of transparency.  However, please be advised that this is a medical document.  It is intended as a peer to peer communication.  It is written in medical language and may contain abbreviations or verbiage that are technical and unfamiliar.  It may appear blunt or direct.  Medical documents are intended to carry relevant information, facts as evident, and the clinical opinion of the practitioner.

## 2024-01-10 ENCOUNTER — NURSE ONLY (OUTPATIENT)
Dept: PAIN CLINIC | Facility: CLINIC | Age: 34
End: 2024-01-10
Payer: MEDICAID

## 2024-01-10 ENCOUNTER — OFFICE VISIT (OUTPATIENT)
Dept: PAIN CLINIC | Facility: CLINIC | Age: 34
End: 2024-01-10
Payer: MEDICAID

## 2024-01-10 VITALS — OXYGEN SATURATION: 98 % | DIASTOLIC BLOOD PRESSURE: 86 MMHG | HEART RATE: 109 BPM | SYSTOLIC BLOOD PRESSURE: 132 MMHG

## 2024-01-10 DIAGNOSIS — M54.81 BILATERAL OCCIPITAL NEURALGIA: Primary | ICD-10-CM

## 2024-01-10 PROCEDURE — 64405 NJX AA&/STRD GR OCPL NRV: CPT | Performed by: ANESTHESIOLOGY

## 2024-01-10 PROCEDURE — 3075F SYST BP GE 130 - 139MM HG: CPT | Performed by: ANESTHESIOLOGY

## 2024-01-10 PROCEDURE — 3079F DIAST BP 80-89 MM HG: CPT | Performed by: ANESTHESIOLOGY

## 2024-01-10 RX ORDER — TRIAMCINOLONE ACETONIDE 40 MG/ML
40 INJECTION, SUSPENSION INTRA-ARTICULAR; INTRAMUSCULAR ONCE
Status: COMPLETED | OUTPATIENT
Start: 2024-01-10 | End: 2024-01-10

## 2024-01-10 RX ORDER — BUPIVACAINE HYDROCHLORIDE 5 MG/ML
9 INJECTION, SOLUTION EPIDURAL; INTRACAUDAL ONCE
Status: COMPLETED | OUTPATIENT
Start: 2024-01-10 | End: 2024-01-10

## 2024-01-10 NOTE — PROCEDURES
[facility]  Operative Report  1/10/2024     Tammy Ramirez Patient Status:  No patient class for patient encounter    1990 MRN [id number]   Location [facility] Attending No att. providers found   [facility] Day # 0 PCP Landon Long MD     Indication: Tammy is a 33 year old female with occipital neuralgia    Preoperative Diagnosis: Occipital neuralgia    Postoperative Diagnosis: Same as above.    Procedure performed: Bilateral greater occipital nerve block    Anesthesia: Local .    EBL: Less than 1 ml.    Procedure Description:   After reviewing the patient's history and performing a focused physical examination, the diagnosis was confirmed and contraindications such as infection and coagulopathy were ruled out.  Following review of potential side effects and complications, including but not necessarily limited to infection, allergic reaction, local tissue breakdown, nerve injury, and paresis, the patient indicated they understood and agreed to proceed. After obtaining the informed consent, the patient was brought to the procedure room and monitored.     The back of the head was prepped and draped. The greater occipital artery was palpated on both sides and marked. The greater and lesser occipital nerve blocks were performed medial and lateral to the arterial pulsation by using a 27-gauge 1.5 inch regular needle.  The medication was consisted of 10 mg Depo-medrol mixed with 2 mL of 1% lidocaine on each side.  Another procedure was performed on the contralateral side in a similar approach. The patient tolerated the procedure very well.  The patient was discharged in good condition to a responsible adult after discharge criteria were met.     Complications: None.    Follow up: In clinic as needed    Guy Barnes MD

## 2024-01-10 NOTE — PATIENT INSTRUCTIONS
Refill policies:    Allow 2-3 business days for refills; controlled substances may take longer.  Contact your pharmacy at least 5 days prior to running out of medication and have them send an electronic request or submit request through the “request refill” option in your [employer] account.  Refills are not addressed on weekends; covering physicians do not authorize routine medications on weekends.  No narcotics or controlled substances are refilled after noon on Fridays or by on call physicians.  By law, narcotics must be electronically prescribed.  A 30 day supply with no refills is the maximum allowed.  If your prescription is due for a refill, you may be due for a follow up appointment.  To best provide you care, patients receiving routine medications need to be seen at least once a year.  Patients receiving narcotic/controlled substance medications need to be seen at least once every 3 months.  In the event that your preferred pharmacy does not have the requested medication in stock (e.g. Backordered), it is your responsibility to find another pharmacy that has the requested medication available.  We will gladly send a new prescription to that pharmacy at your request.    Scheduling Tests:    If your physician has ordered radiology tests such as MRI or CT scans, please contact Central Scheduling at [phone] right away to schedule the test.  Once scheduled, the [facility] Centralized Referral Team will work with your insurance carrier to obtain pre-certification or prior authorization.  Depending on your insurance carrier, approval may take 3-10 days.  It is highly recommended patients assure they have received an authorization before having a test performed.  If test is done without insurance authorization, patient may be responsible for the entire amount billed.      Precertification and Prior Authorizations:  If your physician has recommended that you have a procedure or additional testing performed the [facility]  Centralized Referral Team will contact your insurance carrier to obtain pre-certification or prior authorization.    You are strongly encouraged to contact your insurance carrier to verify that your procedure/test has been approved and is a COVERED benefit.  Although the [facility] Centralized Referral Team does its due diligence, the insurance carrier gives the disclaimer that \"Although the procedure is authorized, this does not guarantee payment.\"    Ultimately the patient is responsible for payment.   Thank you for your understanding in this matter.  Paperwork Completion:  If you require FMLA or disability paperwork for your recovery, please make sure to either drop it off or have it faxed to our office at [phone]. Be sure the form has your name and date of birth on it.  The form will be faxed to our Forms Department and they will complete it for you.  There is a 25$ fee for all forms that need to be filled out.  Please be aware there is a 10-14 day turnaround time.  You will need to sign a release of information (YOAN) form if your paperwork does not come with one.  You may call the Forms Department with any questions at [phone].  Their fax number is [phone].

## 2024-01-10 NOTE — PROGRESS NOTES
Patient presents in office today with reported pain in back of head radiating to front of head    Current pain level reported = 6/10    Last reported dose of naratriptan and furicet this morning      Narcotic Contract renewal NA    Urine Drug screen NA

## 2024-01-10 NOTE — PROGRESS NOTES
Timeout completed prior to procedure @ 1667.  Participants present for timeout:  Dr. Barnes, CORY Lu, pt's spouse, and patient.

## 2024-01-11 PROBLEM — R79.89 LOW TSH LEVEL: Status: ACTIVE | Noted: 2024-01-11

## 2024-01-11 PROBLEM — E55.9 VITAMIN D DEFICIENCY: Status: ACTIVE | Noted: 2024-01-11

## 2024-01-21 ENCOUNTER — LAB ENCOUNTER (OUTPATIENT)
Dept: LAB | Facility: HOSPITAL | Age: 34
End: 2024-01-21
Payer: MEDICAID

## 2024-01-21 ENCOUNTER — EKG ENCOUNTER (OUTPATIENT)
Dept: LAB | Facility: HOSPITAL | Age: 34
End: 2024-01-21
Payer: MEDICAID

## 2024-01-21 DIAGNOSIS — O99.810 ABNORMAL MATERNAL GLUCOSE TOLERANCE, ANTEPARTUM: Primary | ICD-10-CM

## 2024-01-21 DIAGNOSIS — O99.210 OTHER OBESITY DUE TO EXCESS CALORIES AFFECTING PREGNANCY, ANTEPARTUM: ICD-10-CM

## 2024-01-21 DIAGNOSIS — E66.09 OTHER OBESITY DUE TO EXCESS CALORIES AFFECTING PREGNANCY IN FIRST TRIMESTER: ICD-10-CM

## 2024-01-21 DIAGNOSIS — O99.211 OTHER OBESITY DUE TO EXCESS CALORIES AFFECTING PREGNANCY IN FIRST TRIMESTER: ICD-10-CM

## 2024-01-21 DIAGNOSIS — E66.09 OTHER OBESITY DUE TO EXCESS CALORIES AFFECTING PREGNANCY, ANTEPARTUM: ICD-10-CM

## 2024-01-21 DIAGNOSIS — O10.919 CHRONIC HYPERTENSION AFFECTING PREGNANCY: ICD-10-CM

## 2024-01-21 LAB — GLUCOSE 1H P GLC SERPL-MCNC: 162 MG/DL

## 2024-01-21 PROCEDURE — 93010 ELECTROCARDIOGRAM REPORT: CPT | Performed by: INTERNAL MEDICINE

## 2024-01-21 PROCEDURE — 36415 COLL VENOUS BLD VENIPUNCTURE: CPT

## 2024-01-21 PROCEDURE — 93005 ELECTROCARDIOGRAM TRACING: CPT

## 2024-01-21 PROCEDURE — 82950 GLUCOSE TEST: CPT

## 2024-01-22 PROBLEM — R94.31 ABNORMAL EKG: Status: ACTIVE | Noted: 2024-01-21

## 2024-01-22 PROBLEM — R94.31 ABNORMAL EKG: Status: ACTIVE | Noted: 2024-01-22

## 2024-01-22 LAB
ATRIAL RATE: 87 BPM
P AXIS: 19 DEGREES
P-R INTERVAL: 152 MS
Q-T INTERVAL: 358 MS
QRS DURATION: 94 MS
QTC CALCULATION (BEZET): 430 MS
R AXIS: 44 DEGREES
T AXIS: 12 DEGREES
VENTRICULAR RATE: 87 BPM

## 2024-01-23 NOTE — PROGRESS NOTES
Discussed:elevated 1 hr GTT at 14 wk, recommends 3 hr GTT. Not eat or drink 8h prior to your test, the test is 3 hours long. Patient verbalized understanding, agreed to and intend to comply with plan of care.

## 2024-01-24 ENCOUNTER — TELEPHONE (OUTPATIENT)
Facility: CLINIC | Age: 34
End: 2024-01-24

## 2024-01-24 ENCOUNTER — ROUTINE PRENATAL (OUTPATIENT)
Facility: CLINIC | Age: 34
End: 2024-01-24
Payer: MEDICAID

## 2024-01-24 VITALS
HEART RATE: 112 BPM | BODY MASS INDEX: 37.16 KG/M2 | WEIGHT: 217.63 LBS | DIASTOLIC BLOOD PRESSURE: 90 MMHG | SYSTOLIC BLOOD PRESSURE: 144 MMHG | HEIGHT: 64 IN

## 2024-01-24 DIAGNOSIS — M62.89 PELVIC FLOOR DYSFUNCTION: ICD-10-CM

## 2024-01-24 DIAGNOSIS — G43.711 CHRONIC MIGRAINE WITHOUT AURA, WITH INTRACTABLE MIGRAINE, SO STATED, WITH STATUS MIGRAINOSUS: ICD-10-CM

## 2024-01-24 DIAGNOSIS — O34.42 ABNORMAL CERVICAL PAPANICOLAOU SMEAR AFFECTING PREGNANCY IN SECOND TRIMESTER: ICD-10-CM

## 2024-01-24 DIAGNOSIS — R79.89 LOW TSH LEVEL: ICD-10-CM

## 2024-01-24 DIAGNOSIS — R87.619 ABNORMAL CERVICAL PAPANICOLAOU SMEAR AFFECTING PREGNANCY IN SECOND TRIMESTER: ICD-10-CM

## 2024-01-24 DIAGNOSIS — O99.612 CONSTIPATION DURING PREGNANCY IN SECOND TRIMESTER: ICD-10-CM

## 2024-01-24 DIAGNOSIS — E55.9 VITAMIN D DEFICIENCY: ICD-10-CM

## 2024-01-24 DIAGNOSIS — R87.810 CERVICAL HIGH RISK HPV (HUMAN PAPILLOMAVIRUS) TEST POSITIVE: ICD-10-CM

## 2024-01-24 DIAGNOSIS — F41.9 ANXIETY DISORDER AFFECTING PREGNANCY, ANTEPARTUM: ICD-10-CM

## 2024-01-24 DIAGNOSIS — O21.9 NAUSEA AND VOMITING OF PREGNANCY, ANTEPARTUM: ICD-10-CM

## 2024-01-24 DIAGNOSIS — R94.31 ABNORMAL EKG: ICD-10-CM

## 2024-01-24 DIAGNOSIS — O26.892 HEADACHE IN PREGNANCY, SECOND TRIMESTER: ICD-10-CM

## 2024-01-24 DIAGNOSIS — E66.09 OTHER OBESITY DUE TO EXCESS CALORIES AFFECTING PREGNANCY IN SECOND TRIMESTER: ICD-10-CM

## 2024-01-24 DIAGNOSIS — M54.81 BILATERAL OCCIPITAL NEURALGIA: ICD-10-CM

## 2024-01-24 DIAGNOSIS — O10.919 CHRONIC HYPERTENSION AFFECTING PREGNANCY: Primary | ICD-10-CM

## 2024-01-24 DIAGNOSIS — R51.9 HEADACHE IN PREGNANCY, SECOND TRIMESTER: ICD-10-CM

## 2024-01-24 DIAGNOSIS — O99.340 ANXIETY DISORDER AFFECTING PREGNANCY, ANTEPARTUM: ICD-10-CM

## 2024-01-24 DIAGNOSIS — O99.212 OTHER OBESITY DUE TO EXCESS CALORIES AFFECTING PREGNANCY IN SECOND TRIMESTER: ICD-10-CM

## 2024-01-24 DIAGNOSIS — Z36.1 ANTENATAL SCREENING FOR RAISED ALPHAFETOPROTEIN LEVEL: ICD-10-CM

## 2024-01-24 DIAGNOSIS — K59.00 CONSTIPATION DURING PREGNANCY IN SECOND TRIMESTER: ICD-10-CM

## 2024-01-24 PROCEDURE — 3077F SYST BP >= 140 MM HG: CPT | Performed by: OBSTETRICS & GYNECOLOGY

## 2024-01-24 PROCEDURE — 3080F DIAST BP >= 90 MM HG: CPT | Performed by: OBSTETRICS & GYNECOLOGY

## 2024-01-24 PROCEDURE — 99215 OFFICE O/P EST HI 40 MIN: CPT | Performed by: OBSTETRICS & GYNECOLOGY

## 2024-01-24 PROCEDURE — 3008F BODY MASS INDEX DOCD: CPT | Performed by: OBSTETRICS & GYNECOLOGY

## 2024-01-24 NOTE — PATIENT INSTRUCTIONS
Please try to log BP. Check at least once a day.   May want to check it twice a day even. Would see if correlated/timing with HA.   Please submit BP 1-2 times per week through [employer].   If BP is consistently 140s/90s or higher, we should start medication to protect your brain & the placenta.     [facility]    [address], [address], [place] [address]. Ph: [phone]  [address], [address], [address]nd [place] [address]. Ph: [phone] & [phone]  [address], [place] [address]. Ph [phone]  [address], [place] [address]. Ph [phone]  [address] [place] [address]. Ph [phone]  [address] [place] [address]. Ph [phone]        The Role of Physical Therapy in the Treatment of Pelvic Floor Dysfunction:    Physical therapists are trained to evaluate and treat dysfunctions in the joints, muscles, nerves and scar. Physical therapists specifically trained in the area of pelvic health can identify the possible musculoskeletal causes of pelvic pain, bladder and bowel difficulties and develop a treatment plan specific to the individual suffering from this difficulties.     What to expect at your first physical therapy appointment:   Your first visit will include an initial evaluation in a comfortable, private room by a therapist who has undergone advanced education and training in the evaluation and treatment of pelvic muscle dysfunction. The therapist will obtain a detailed history of your health, pain and activity limitations. She will also ask you about any bowel, bladder and sexual difficulties as these are in part controlled by the pelvic muscles. The therapist will then take a look at your posture, mobility of your spine and hips and strength and flexibility of pelvic girdle muscles. She will examine any scar tissue and trigger points in the muscles of your pelvic region.     The therapist will also specifically examine the  pelvic floor muscles. Your pelvic floor consists of a group of muscles that attach behind the pubic bone in the front to the tail bone in the back. They are responsible for providing support to the pelvic joints and organs, relaxing to allow the passage of urine, stool and gas and arelis to prevent the loss of urine, stool and gas as appropriate. In order to best examine these muscles you will be asked to undress from the waist down and be covered with a sheet. The therapist will use a lubricated, gloved finger to identify painful muscles around and in your vagina or rectum then instruct you to contract and relax these muscles in order to determine how the muscles are functioning. Care is taken to make you as comfortable as possible with the exam.     Your therapist will discuss the evaluation results with you and provide you with education regarding your specific condition and the expectation of therapy. She will answer all of your questions and will work with you to establish a treatment plan based on the results of the evaluation and your goals for therapy.       Aspirin in pregnancy  -81 mg tablet - take 1.5 or 2 tablets per day. Start at 12-13 weeks   -may help prevent  pre-eclampsia by helping with placental development and function  -may discontinue at term (37 wk)      AFP Level     There is an optional blood test that we can perform on you called \"AFP level.\" It is a chemical in the bloodstream produced by the pregnancy. It is done between 15-20 weeks gestation. The ideal time for testing is actually 16-18 weeks gestation. If the level is abnormally elevated, this can indicate the presence of abnormalities of the development of the brain and spinal cord such as anencephaly (lack of brain development) and spina bifida (exposed spinal cord). These anomalies can generally be seen on ultrasound. It can also be elevated in cases of normal fetal anatomy and can indicate an abnormal placenta. This may  or may not be able to be detected on ultrasound. Please think about whether or not you would like to have this test done. When you decide when you would like to have this test done, please let us know. We must enter your exact gestational age and weight on the date of the blood draw for the test to be calculated accurately.      Maternal Fetal Medicine (MFM)  Cara Mchugh Taylor, Terence   [address], [address]   Ph [phone]    [address]  [phone]    Jason Soler  [address] [facility], [address]  Ph [phone]    [address]  Ph [phone]

## 2024-01-24 NOTE — PROGRESS NOTES
KYLEIHG    Partner & child here     Vit B6 helping -> nausea better. Occasional vomiting a few times per week. Hasn't needed anything else.   Bloating is still significant. Within a few bites of eating  Denies hard stools, but sometimes can be harder to evacuate.     Magnesium for the HA as of the last few days - no change noted yet in HA  Had nerve blocks -> for the 1st 24 hr was very good but after that not really different. Has appt to see pain specialist soon  HA this morning. Not currently.   Naratriptan Rx per neuro- MFM was ok with it.     No cramping   No vaginal bleeding.     Brought home BP cuff today with her to clinic  -at home BP has been higher (was high at 2023 as well) - Did go to Immediate care in 2023 & it was high at that time.   -home BP cuff read 153/103 here today (she does NOT have HA currently)  -she feels the cuff is pretty big & does not feel that it is too small for her  -she plans to get a new BP cuff & see how that compares.   -she is now wondering if her headaches are due to high BP, rather than the HA causing the elevated BP  -no log today     23 EKG  Normal sinus rhythm  Cannot rule out Anterior infarct , age undetermined  Abnormal ECG  When compared with ECG of 2023 17:17,  Premature ventricular complexes are no longer Present  Confirmed by CICI MONTOYA, NADEEM ([id number]) on 2024 4:57:49 PM     Vitals:    24 1530   BP: 144/90   Pulse: 112   Weight: 217 lb 9.6 oz (98.7 kg)   Height: 64\"       33 year old  at 14w5d   FRANCIS 24 by 6w3d US not c/w LMP   O+    GIRL  -NIPS neg   -NT normal   -carrier neg   -AFP desired - order    -Flu  COVID-19 booster encouraged     Dating  -FRANCIS 24 by 6w3d US not c/w LMP  -patient reports periods are regular (per EMR h/o menometrorrhagia noted in 2018)    Elevated BP/Labile - recommend surveil as CHTN  -Probable underlying Chronic HTN (pt thought HA causing high BP, but may be other way around)    -Never formally diagnosed with CHTN, but multiple high readings prior to pregnancy  -per EMR intermittently hypertensive even at routine office visits since 2018   -1/6/22 /96 (pre-pregnancy office visit)   -6/5/23 /90 (pre-pregnancy office visit)   -10/30/23 /88 (very early 1st trim office visit)   -1st OB visit - 6w3d /97, repeat 120/77   -Early consult & NT US with M - normal. /88   -aspirin recommended - taking 81 mg tab x 2 daily.   -baseline EKG - 1/21 - ABNORMAL. Cannot rule out anterior infarct  -baseline CMP & urine P/C WNL  -baseline TSH 0.235 (L) - 12/24  -1/24/24 - /90 in office at 14w5d (NO CURRENT HA.) Patient reports BP has been high at home even 150s/100s.   -Reviewed regardless of etiology of the increased BP, elevated BP (even spikes) can damage placenta  -BP monitoring at home.   -recommend BP at least 1x/day (can do 2x/day) - to establish baseline & pattern, association/timing of HA  -asked pt to submit [facility] log [facility] per [facility]  -if consistently 140s/90s or higher, will start medication - probably labetalol (to avoid possible HA side effect of nifedipine)  -L2 US appt 3/1/24  -Recommend surveillance as if chronic HTN - reviewed potential for placental damage, fetal growth restriction, stillbirth   -Message sent to Dr. Rosado to discuss starting monthly growth US at 28 wk   -growth US & BPP at 32 wk per [facility]   -NSTs at 32 wk     ABNORMAL EKG - 1/21/24  -EKG that has some changes that appear new when compared with the 1/13/23 EKG. Report reads cannot rule out anterior infarct. Sometimes the EKG looks different purely due to pregnancy, but to be on the safe side, would recommend seeing cardiologist for an opinion & to establish care.   -consult for Dr. Paradise Paul ordered. Has scheduled appt for one of her partners - next week has appt.      Low TSH with normal FT4 & FT3  -this is common in early pregnancy from high HCG level  -we don't treat this  pattern of thyroid labs  -repeat TSH in 2nd trimester     Failed early 1 hr GTT   -3 hr GTT has been ordered. Reminded      Vitamin D deficiency  -1/6/24 Vit D 12.8 at 12 wk   -optimal dosing in pregnancy is controversial, but given starting out very low, Rx 50,000 international units weekly for 8 weeks, then switch to 4000 units daily over the counter in addition to prenatal vitamin     Obesity (pre preg BMI 36)   -limit weight gain 11-20 lb   -Early 1 hr GTT Failed -> 3 hr GTT ordered  -L2US, growth US, NSTs per above    H/o intractable migraines  -onset age 11.  20 or more migraines per month.   -only 4-5 hours of sleep on regular basis   -MRI C spine 2020 - Mild multilevel disc desiccation and bulging without significant loss of intervertebral disc space height. No significant spinal canal or neuroforaminal stenosis.   -MRI brain 2/12/21 - No acute intracranial abnormality.   -MRI c spine 8/21 - unremarkable. Done for left-sided facial and arm   numbness and radiculopathy.   -Past treatments: Botox, Nurtec, Aimovig, Ajovy, Amitriptyline, nortriptyline, propranolol, Depakote, Topamax, venlafaxine, Norco, Fioricet, multiple triptans, flexeril, and OTC analgesics. She has had facet and cervical epidurals as well as SPG block none of which provided much relief. Osteopathic manipulation therapy, PT, chiropractic, massage acupuncture, magnesium, CoQ10, vitamin B complex, elimination diet.   -Botox helped partially in the past   -Neurologist Dr. Robe Gutierrez - currently taking Naratriptan 2.5 mg PRN  -Took Qulipta x 1 week & seemed to be working - stopped when found out she is pregnant.   -per patient, when she notified Dr. Gutierrez she is pregnant, he is no longer comfortable managing her migraine medication.   -Pain management 12/7/23 bilateral occipital neuralgia. Migraines originate from the occipital region, L>R. Bilateral occipital block, pending insurance approval and OBGyn approval.  Follow-up 2 to 3 weeks  thereafter.   -1/10/24 - Bilateral greater occipital nerve block done. Improvement for 24 hr.   -1/24/24 - Consider sleep study. Will order.     CHEYANNE, insomnia  -meds: none currently. H/o zoloft, clonazepam, venlafaxine   -therapist - not currently. Not very interested. Resources given     H/o PVCs   -Fhx Brugada syndrome - she tested negative   -3/31/2021 TTE - LVEF 55-60%. Unremarkable  -Holter monitor evaluation 4/2021 - was told her PVCs are minor and offered her medications if she wanted it - she declined - feeling fine.    NVP  -doing better with vit B6     Bloating, constipation/difficulty evacuating stools  -encouraged increased fiber & water  -possible pelvic floor dysfunction. PFPT ordered    Pap neg, +HPV 18/45 (11/27/23)  -colposcopy recommended. Not scheduled yet. Encouraged to schedule.      RTC 4 wk

## 2024-01-25 ENCOUNTER — TELEPHONE (OUTPATIENT)
Facility: CLINIC | Age: 34
End: 2024-01-25

## 2024-01-25 DIAGNOSIS — O10.919 CHRONIC HYPERTENSION AFFECTING PREGNANCY: Primary | ICD-10-CM

## 2024-01-25 DIAGNOSIS — O99.212 OTHER OBESITY DUE TO EXCESS CALORIES AFFECTING PREGNANCY IN SECOND TRIMESTER: ICD-10-CM

## 2024-01-25 DIAGNOSIS — E66.09 OTHER OBESITY DUE TO EXCESS CALORIES AFFECTING PREGNANCY IN SECOND TRIMESTER: ICD-10-CM

## 2024-01-25 NOTE — TELEPHONE ENCOUNTER
Spoke with pt. Aware of Dr. Casanova's recommendations for a growth ultrasound monthly starting at 28 weeks. [facility] scheduling number given. Verbalized understanding.

## 2024-01-25 NOTE — TELEPHONE ENCOUNTER
Per personal communication with M Dr. Rosado    Will surveil as chronic HTN  Growth US monthly starting at 28 wk (with BPP at or beyond 32 wk)  NST weekly at 32 wk   Start antihypertensive for consistent /90 or greater     Will ask patient to schedule growth US with M at 28 wk     Diagnoses and all orders for this visit:    Chronic hypertension affecting pregnancy  -     OP REFERRAL TO  CONSULT    Other obesity due to excess calories affecting pregnancy in second trimester  -     OP REFERRAL TO  CONSULT      Herminia Casanova MD

## 2024-02-02 ENCOUNTER — TELEPHONE (OUTPATIENT)
Dept: PAIN CLINIC | Facility: CLINIC | Age: 34
End: 2024-02-02

## 2024-02-02 ENCOUNTER — OFFICE VISIT (OUTPATIENT)
Dept: PAIN CLINIC | Facility: CLINIC | Age: 34
End: 2024-02-02
Payer: MEDICAID

## 2024-02-02 VITALS — OXYGEN SATURATION: 97 % | HEART RATE: 96 BPM | SYSTOLIC BLOOD PRESSURE: 136 MMHG | DIASTOLIC BLOOD PRESSURE: 82 MMHG

## 2024-02-02 DIAGNOSIS — M54.81 BILATERAL OCCIPITAL NEURALGIA: Primary | ICD-10-CM

## 2024-02-02 PROCEDURE — 99214 OFFICE O/P EST MOD 30 MIN: CPT | Performed by: PHYSICIAN ASSISTANT

## 2024-02-02 RX ORDER — ONDANSETRON 4 MG/1
TABLET, ORALLY DISINTEGRATING ORAL
COMMUNITY
Start: 2024-01-29

## 2024-02-02 RX ORDER — VENLAFAXINE HYDROCHLORIDE 37.5 MG/1
CAPSULE, EXTENDED RELEASE ORAL
COMMUNITY
Start: 2024-01-02

## 2024-02-02 RX ORDER — PROMETHAZINE HYDROCHLORIDE 12.5 MG/1
SUPPOSITORY RECTAL
COMMUNITY
Start: 2024-01-29

## 2024-02-02 NOTE — PROGRESS NOTES
Last procedure:   Bilateral Occipital Nerve Block     Date: 1/10/24  Percentage of relief obtained: 0%  Duration of relief: Worked 24 hours, then was no relief    Current Pain Score: 6

## 2024-02-02 NOTE — TELEPHONE ENCOUNTER
Prior Authorization for bilateral occipital NB  initiated with [phone]  CPT codes: 35615   pending reference # [id number]  Clinical notes submitted via [phone]  confirmation received. Yes

## 2024-02-02 NOTE — TELEPHONE ENCOUNTER
Prior authorization request completed for: bilateral occipital NB  Authorization# [id number]  Authorization dates: 2/2/2024-4/2/2024  CPT codes approved: 28132  Number of visits/dates of service approved: 1  Physician: MARISOL  Location: OFFICE     Patient can be scheduled. Routed to Navigator.

## 2024-02-02 NOTE — PATIENT INSTRUCTIONS
Refill policies:    Allow 2-3 business days for refills; controlled substances may take longer.  Contact your pharmacy at least 5 days prior to running out of medication and have them send an electronic request or submit request through the “request refill” option in your [employer] account.  Refills are not addressed on weekends; covering physicians do not authorize routine medications on weekends.  No narcotics or controlled substances are refilled after noon on Fridays or by on call physicians.  By law, narcotics must be electronically prescribed.  A 30 day supply with no refills is the maximum allowed.  If your prescription is due for a refill, you may be due for a follow up appointment.  To best provide you care, patients receiving routine medications need to be seen at least once a year.  Patients receiving narcotic/controlled substance medications need to be seen at least once every 3 months.  In the event that your preferred pharmacy does not have the requested medication in stock (e.g. Backordered), it is your responsibility to find another pharmacy that has the requested medication available.  We will gladly send a new prescription to that pharmacy at your request.    Scheduling Tests:    If your physician has ordered radiology tests such as MRI or CT scans, please contact Central Scheduling at [phone] right away to schedule the test.  Once scheduled, the [facility] Centralized Referral Team will work with your insurance carrier to obtain pre-certification or prior authorization.  Depending on your insurance carrier, approval may take 3-10 days.  It is highly recommended patients assure they have received an authorization before having a test performed.  If test is done without insurance authorization, patient may be responsible for the entire amount billed.      Precertification and Prior Authorizations:  If your physician has recommended that you have a procedure or additional testing performed the [facility]  Centralized Referral Team will contact your insurance carrier to obtain pre-certification or prior authorization.    You are strongly encouraged to contact your insurance carrier to verify that your procedure/test has been approved and is a COVERED benefit.  Although the [facility] Centralized Referral Team does its due diligence, the insurance carrier gives the disclaimer that \"Although the procedure is authorized, this does not guarantee payment.\"    Ultimately the patient is responsible for payment.   Thank you for your understanding in this matter.  Paperwork Completion:  If you require FMLA or disability paperwork for your recovery, please make sure to either drop it off or have it faxed to our office at [phone]. Be sure the form has your name and date of birth on it.  The form will be faxed to our Forms Department and they will complete it for you.  There is a 25$ fee for all forms that need to be filled out.  Please be aware there is a 10-14 day turnaround time.  You will need to sign a release of information (YOAN) form if your paperwork does not come with one.  You may call the Forms Department with any questions at [phone].  Their fax number is [phone].

## 2024-02-02 NOTE — PROGRESS NOTES
HPI:   Tammy Ramirez presents with complaints of migraines with occipital pain.    The pain is described as severe aching that is constant .  The patient’s activity level has remained the same since last visit.  The pain is worst unrelated to time of day.    Changes in condition/history since last visit: Patient is here today for follow-up after bilateral occipital block on 1/10/2024.  Procedure was well-tolerated, and had no adverse effects.  Overall, reports almost immediate improvement in pain, and for just over a day, was pain-free (100% proved).  After this, pain rapidly did return to baseline.    Last procedure: Bilateral occipital block    date: 1/10/2024    Percentage of relief experienced from the procedure: 100%    Duration of the relief: 1 day    The following activities will increase the patient’s pain: nothing specific    The following activities decrease the patient’s pain:  Nothing specific    Functional Assessment: Patient reports that they are able to complete all of their ADL's such as eating, bathing, using the toilet, dressing and getting up from a bed or a chair independently.    Current Medications:  Current Outpatient Medications   Medication Sig Dispense Refill    ondansetron 4 MG Oral Tablet Dispersible ondansetron 4 MG Oral Tablet Dispersible, [RxNorm: 762252]      promethazine 12.5 MG Rectal Suppos promethazine 12.5 MG Rectal Suppos, [RxNorm: 849225]      Cholecalciferol (VITAMIN D3) 1.25 MG (00897 UT) Oral Cap Take 1 tablet by mouth once a week for 8 doses. Then switch to vitamin D3 4,000 IU daily in addition to prenatal vitamin. 8 capsule 0    prochlorperazine 25 MG Rectal Suppos Place 1 suppository (25 mg total) rectally every 12 (twelve) hours as needed for Nausea. 12 each 1    famotidine 20 MG Oral Tab Take 1 tablet (20 mg total) by mouth 2 (two) times daily. 60 tablet 5    Prenatal MV-Min-Fe Fum-FA-DHA (PRENATAL 1 OR) Take by mouth.      butalbital-acetaminophen-caffeine  -40 MG Oral Tab Take 1 tablet by mouth every 6 (six) hours as needed for Pain. 30 tablet 1    Naratriptan HCl 2.5 MG Oral Tab Take 1 tablet (2.5 mg total) by mouth as needed for Migraine. 21 tablet 3    venlafaxine ER 37.5 MG Oral Capsule SR 24 Hr  (Patient not taking: Reported on 2/2/2024)        Patient requires assistance with: No assistance required    Reviewed Patient History Dated: 1/10/2024 no changes noted    Physical Exam:   /82 (BP Location: Right arm, Patient Position: Sitting)   Pulse 96   LMP 09/22/2023 (Exact Date)   SpO2 97%   VAS Pain Score:  /10  General Appearance: Well developed, well nourished, normal build, independent body habitus, no apparent physical disabilities, well groomed    Neurological Exam: WNL-Orientation to time, place and person, normal mood & effect, normal concentration & attention span  Inspection: Nonantalgic, no acute distress  Radiology/Lab Test Reviewed: No new imaging  Lab Results   Component Value Date    WBC 11.8 (H) 11/29/2023    WBC 12.7 (H) 01/13/2023   No results found for: \"HEMOGLOBIN\"  Lab Results   Component Value Date    .0 11/29/2023    .0 01/13/2023         Do you have any known blood/bleeding disorders?  No  Does patient currently take blood thinners?   None  Does patient currently take any antibiotics?   No  Patient educated and verbalized understanding.  Medical Decision Making:   Diagnosis:    Encounter Diagnosis   Name Primary?    Bilateral occipital neuralgia Yes     Impression: Chronic migraine since age 11 with an occipital component, for which, she was sent for consideration of nerve blocks.  Underwent bilateral occipital nerve block on 1/10/2024, from which, she had elimination of pain for approximately 24 hours.  After this, pain rapidly returned to baseline.  We did discuss options, and she does wish to repeat procedure.  We are certainly willing to do so 1 more time, though she has no sustained relief with repeat  procedure, would have her follow-up with neuro.    Plan: Patient to schedule the following injection: Bilateral occipital block Levels: N/A, Procedure and risks were discussed with pt. including headache, bleeding, infection and potential nerve damage.  Follow-up 2 to 3 weeks.    No orders of the defined types were placed in this encounter.      Meds & Refills for this Visit:  Requested Prescriptions      No prescriptions requested or ordered in this encounter       Imaging & Consults:  None    The patient indicates understanding of these issues and agrees to the plan.    POLO Hahn

## 2024-02-03 ENCOUNTER — LABORATORY ENCOUNTER (OUTPATIENT)
Dept: LAB | Facility: HOSPITAL | Age: 34
End: 2024-02-03
Payer: MEDICAID

## 2024-02-03 DIAGNOSIS — O99.810 ABNORMAL MATERNAL GLUCOSE TOLERANCE, ANTEPARTUM: ICD-10-CM

## 2024-02-03 LAB
GLUCOSE 1H P GLC SERPL-MCNC: 198 MG/DL
GLUCOSE 2H P GLC SERPL-MCNC: 170 MG/DL
GLUCOSE 3H P GLC SERPL-MCNC: 135 MG/DL (ref 70–140)
GLUCOSE P FAST SERPL-MCNC: 95 MG/DL

## 2024-02-03 PROCEDURE — 36415 COLL VENOUS BLD VENIPUNCTURE: CPT

## 2024-02-03 PROCEDURE — 82952 GTT-ADDED SAMPLES: CPT

## 2024-02-03 PROCEDURE — 82951 GLUCOSE TOLERANCE TEST (GTT): CPT

## 2024-02-04 ENCOUNTER — PATIENT MESSAGE (OUTPATIENT)
Dept: FAMILY MEDICINE CLINIC | Facility: CLINIC | Age: 34
End: 2024-02-04

## 2024-02-04 DIAGNOSIS — O24.410 DIET CONTROLLED GESTATIONAL DIABETES MELLITUS (GDM) IN SECOND TRIMESTER: Primary | ICD-10-CM

## 2024-02-04 DIAGNOSIS — G43.719 INTRACTABLE CHRONIC MIGRAINE WITHOUT AURA AND WITHOUT STATUS MIGRAINOSUS: ICD-10-CM

## 2024-02-04 DIAGNOSIS — G43.711 MIGRAINE, CHRONIC, WITHOUT AURA, INTRACTABLE, WITH STATUS MIGRAINOSUS: Primary | ICD-10-CM

## 2024-02-04 DIAGNOSIS — G43.711 CHRONIC MIGRAINE WITHOUT AURA, WITH INTRACTABLE MIGRAINE, SO STATED, WITH STATUS MIGRAINOSUS: ICD-10-CM

## 2024-02-05 RX ORDER — NARATRIPTAN 2.5 MG/1
2.5 TABLET ORAL AS NEEDED
Qty: 21 TABLET | Refills: 3 | Status: SHIPPED | OUTPATIENT
Start: 2024-02-05

## 2024-02-05 RX ORDER — BUTALBITAL, ACETAMINOPHEN AND CAFFEINE 50; 325; 40 MG/1; MG/1; MG/1
1 TABLET ORAL EVERY 6 HOURS PRN
Qty: 30 TABLET | Refills: 1 | Status: SHIPPED | OUTPATIENT
Start: 2024-02-05

## 2024-02-05 NOTE — TELEPHONE ENCOUNTER
.A refill request was received for:  Requested Prescriptions     Pending Prescriptions Disp Refills    Naratriptan HCl 2.5 MG Oral Tab 21 tablet 3     Sig: Take 1 tablet (2.5 mg total) by mouth as needed for Migraine.    butalbital-acetaminophen-caffeine -40 MG Oral Tab 30 tablet 1     Sig: Take 1 tablet by mouth every 6 (six) hours as needed for Pain.       Last refill date:   butabital 11/14/2023  Naratripitan 10/11/2023    Last office visit: 11/14/2023    Follow up due:  Future Appointments   Date Time Provider Department Center   2/7/2024  1:45 PM Meaghan Rock DO [facility] [place]   2/12/2024  3:15 PM April Burden APSANDRA [facility] [place]   2/20/2024  3:00 PM Tammy Novak APRN [facility] [place]   3/1/2024  2:00 PM [facility]1 [facility]   3/25/2024 12:15 PM AsyaYemien, PT [facility]   4/3/2024  3:45 PM Asya, Trish, PT [facility]   4/10/2024  3:45 PM Asya, Trish, PT [facility]   4/17/2024  3:45 PM Asya, Trish, PT [facility]   4/24/2024  3:45 PM Asya, Trish, PT [facility]   5/1/2024  3:45 PM Asya, Trish, PT [facility]   5/8/2024  3:45 PM Asya, Trish, PT [facility]   5/15/2024  4:30 PM Asya, Trish, PT [facility]   5/22/2024  3:45 PM Asya, Trish, PT [facility]   5/29/2024  3:45 PM Asya, Trish, PT [facility]

## 2024-02-05 NOTE — TELEPHONE ENCOUNTER
From: Tammy Ramirez  To: Landon Long  Sent: 2/4/2024 9:03 PM CST  Subject: Neurologist     Good evening,  I am wondering if I could get another referral to a neurologist? I need a refill on my naratriptan medication but am not currently seeing a neurologist since becoming pregnant. I have seen a maternal fetal medicine dr who has approved of my triptan use during pregnancy which should be in my file. Would I be able to get a refill of the medication and a new referral?     Thanks,  Tammy Ramirez

## 2024-02-05 NOTE — PROGRESS NOTES
Pt aware of results & recommendations to follow up with diabetic education. Phone number given. Verbalized understanding.

## 2024-02-06 ENCOUNTER — TELEPHONE (OUTPATIENT)
Dept: FAMILY MEDICINE CLINIC | Facility: CLINIC | Age: 34
End: 2024-02-06

## 2024-02-07 ENCOUNTER — OFFICE VISIT (OUTPATIENT)
Facility: CLINIC | Age: 34
End: 2024-02-07
Payer: MEDICAID

## 2024-02-07 VITALS
SYSTOLIC BLOOD PRESSURE: 148 MMHG | BODY MASS INDEX: 37.16 KG/M2 | WEIGHT: 217.63 LBS | DIASTOLIC BLOOD PRESSURE: 98 MMHG | HEART RATE: 110 BPM | HEIGHT: 64 IN

## 2024-02-07 DIAGNOSIS — Z01.812 PRE-PROCEDURAL LABORATORY EXAMINATION: ICD-10-CM

## 2024-02-07 DIAGNOSIS — Z34.02 ENCOUNTER FOR SUPERVISION OF NORMAL FIRST PREGNANCY IN SECOND TRIMESTER: Primary | ICD-10-CM

## 2024-02-07 DIAGNOSIS — R87.810 HUMAN PAPILLOMAVIRUS (HPV) TYPE 18 OR 45 DNA DETECTED IN CERVICAL SPECIMEN: ICD-10-CM

## 2024-02-07 DIAGNOSIS — Z3A.16 16 WEEKS GESTATION OF PREGNANCY: ICD-10-CM

## 2024-02-07 PROBLEM — O24.410 DIET CONTROLLED GESTATIONAL DIABETES MELLITUS (GDM), ANTEPARTUM (HCC): Status: ACTIVE | Noted: 2024-02-07

## 2024-02-07 PROBLEM — O24.410 DIET CONTROLLED GESTATIONAL DIABETES MELLITUS (GDM), ANTEPARTUM: Status: ACTIVE | Noted: 2024-02-07

## 2024-02-07 PROCEDURE — 88305 TISSUE EXAM BY PATHOLOGIST: CPT | Performed by: OBSTETRICS & GYNECOLOGY

## 2024-02-07 PROCEDURE — 57455 BIOPSY OF CERVIX W/SCOPE: CPT | Performed by: OBSTETRICS & GYNECOLOGY

## 2024-02-07 RX ORDER — LABETALOL 100 MG/1
100 TABLET, FILM COATED ORAL 2 TIMES DAILY
Qty: 60 TABLET | Refills: 0 | Status: SHIPPED | OUTPATIENT
Start: 2024-02-07

## 2024-02-07 NOTE — PROGRESS NOTES
Colpo w/Cx Biopsy and ECC    Pregnancy Results: IUP 16w5d      Discussed high/low risk HPV, dormant vs active state of the virus, unknown exposure time. Discussed pap smear being a screening test and if abnormal follows by colposcopic examination of the cervix with biopsy.     Consent signed.      Pre-Procedure:    Cervix prepped with:  Acetic acid    Procedure:  Under satisfactory analgesia, the patient was put in the dorsal lithotomy position.  Frederick speculum was placed in the vagina.  Under colposcopic examination the transition zone was seen in entirety.   cervical biopsy taken at 12 o'clock where increased acetowhite changes noted.  Patient tolerated procedure well.      Findings:  Acetowhite epithelium    Impression:  HPV changes    Meaghan Rock DO  2:27 PM  2/7/2024

## 2024-02-07 NOTE — TELEPHONE ENCOUNTER
I have refilled the medication, I am ok with a new referral as well to get seen for options.    Landon Long MD

## 2024-02-12 ENCOUNTER — OFFICE VISIT (OUTPATIENT)
Facility: CLINIC | Age: 34
End: 2024-02-12
Payer: MEDICAID

## 2024-02-12 ENCOUNTER — LAB ENCOUNTER (OUTPATIENT)
Dept: LAB | Facility: HOSPITAL | Age: 34
End: 2024-02-12
Payer: MEDICAID

## 2024-02-12 VITALS — DIASTOLIC BLOOD PRESSURE: 80 MMHG | SYSTOLIC BLOOD PRESSURE: 128 MMHG | HEART RATE: 93 BPM | OXYGEN SATURATION: 98 %

## 2024-02-12 DIAGNOSIS — R79.89 ABNORMAL TSH: ICD-10-CM

## 2024-02-12 DIAGNOSIS — O99.210 OTHER OBESITY DUE TO EXCESS CALORIES AFFECTING PREGNANCY, ANTEPARTUM (HCC): ICD-10-CM

## 2024-02-12 DIAGNOSIS — E66.09 OTHER OBESITY DUE TO EXCESS CALORIES AFFECTING PREGNANCY, ANTEPARTUM (HCC): ICD-10-CM

## 2024-02-12 DIAGNOSIS — R79.89 LOW TSH LEVEL: ICD-10-CM

## 2024-02-12 DIAGNOSIS — Z36.1 ANTENATAL SCREENING FOR RAISED ALPHAFETOPROTEIN LEVEL: ICD-10-CM

## 2024-02-12 DIAGNOSIS — O24.410 DIET CONTROLLED GESTATIONAL DIABETES MELLITUS (GDM) IN SECOND TRIMESTER: Primary | ICD-10-CM

## 2024-02-12 PROBLEM — E05.90 SUBCLINICAL HYPERTHYROIDISM: Status: ACTIVE | Noted: 2024-02-12

## 2024-02-12 LAB
ALBUMIN SERPL-MCNC: 2.9 G/DL (ref 3.4–5)
ALBUMIN/GLOB SERPL: 0.7 {RATIO} (ref 1–2)
ALP LIVER SERPL-CCNC: 72 U/L
ALT SERPL-CCNC: 15 U/L
ANION GAP SERPL CALC-SCNC: 6 MMOL/L (ref 0–18)
AST SERPL-CCNC: 7 U/L (ref 15–37)
BILIRUB SERPL-MCNC: 0.2 MG/DL (ref 0.1–2)
BUN BLD-MCNC: 6 MG/DL (ref 9–23)
CALCIUM BLD-MCNC: 9.5 MG/DL (ref 8.5–10.1)
CHLORIDE SERPL-SCNC: 108 MMOL/L (ref 98–112)
CO2 SERPL-SCNC: 25 MMOL/L (ref 21–32)
CREAT BLD-MCNC: 0.72 MG/DL
EGFRCR SERPLBLD CKD-EPI 2021: 114 ML/MIN/1.73M2 (ref 60–?)
FASTING STATUS PATIENT QL REPORTED: NO
GLOBULIN PLAS-MCNC: 3.9 G/DL (ref 2.8–4.4)
GLUCOSE BLD-MCNC: 113 MG/DL (ref 70–99)
GLUCOSE BLOOD: 113
OSMOLALITY SERPL CALC.SUM OF ELEC: 286 MOSM/KG (ref 275–295)
POTASSIUM SERPL-SCNC: 3.9 MMOL/L (ref 3.5–5.1)
PROT SERPL-MCNC: 6.8 G/DL (ref 6.4–8.2)
SODIUM SERPL-SCNC: 139 MMOL/L (ref 136–145)
T3FREE SERPL-MCNC: 3.34 PG/ML (ref 2.4–4.2)
T4 FREE SERPL-MCNC: 1 NG/DL (ref 0.8–1.7)
TSI SER-ACNC: 0.34 MIU/ML (ref 0.36–3.74)

## 2024-02-12 PROCEDURE — 84439 ASSAY OF FREE THYROXINE: CPT

## 2024-02-12 PROCEDURE — 80053 COMPREHEN METABOLIC PANEL: CPT

## 2024-02-12 PROCEDURE — 82105 ALPHA-FETOPROTEIN SERUM: CPT

## 2024-02-12 PROCEDURE — 84481 FREE ASSAY (FT-3): CPT

## 2024-02-12 PROCEDURE — 84443 ASSAY THYROID STIM HORMONE: CPT

## 2024-02-12 PROCEDURE — 36415 COLL VENOUS BLD VENIPUNCTURE: CPT

## 2024-02-12 RX ORDER — GLUCOSAMINE HCL/CHONDROITIN SU 500-400 MG
CAPSULE ORAL
Qty: 400 STRIP | Refills: 2 | Status: SHIPPED | OUTPATIENT
Start: 2024-02-12

## 2024-02-12 RX ORDER — LANCETS 30 GAUGE
EACH MISCELLANEOUS
Qty: 400 EACH | Refills: 2 | Status: SHIPPED | OUTPATIENT
Start: 2024-02-12

## 2024-02-12 NOTE — PATIENT INSTRUCTIONS
Plan:  - check blood sugar four times daily- Before breakfast (Fasting) and before lunch or dinner, then 2 hours post meal  - see diabetes center educators regarding gestational diabetes (should be Nidhi or Codi) - call  [phone]  - if you can't get in with diabetes center educator in the next 2-3 weeks, call our office back and get on the schedule with Cristy who is our DM educator- Office phone number: [phone]      Pregnancy BG goals: A1C 6-6.5%. Fasting blood sugar goal <95. 1 hour after meal <140. 2 hours after meal <120.     It is important to follow a carb controlled meal plan.   Carbs:   30 gm Breakfast   15 gm snack   45 gm lunch/dinner   15 gm Snack between lunch/dinner   15-30 gm HS snack w a protein   45gm of carb for dinner      Carb counting apps: Carb manager, lose it!

## 2024-02-12 NOTE — PROGRESS NOTES
[facility] Note    Name: Tammy Ramirez    Date: 2/12/2024    HISTORY OF PRESENT ILLNESS   Tammy Ramirez is a 33 year old female with PMHx significant for anxiety, HTN, migraines, DDD, dysmenorrhea, obesity who presents for gestational DM consultation.    Subjective:    Initial HPI consult in February 2024  Gestational age: 17w3d today.   Last A1c value was 5.5% done 11/29/2023.  Presenting for gestational DM consultation  Completed 1h GTT - 198, 2h OGTT- 170, 3h OGTT 135  She was referred to DM center educators, but hasn't seen them yet    No fam hx of DM  Blood Glucose log/CGM: reviewed, not checking yet  Diet: eating 'more carefully', whole wheat breads, cut down on white grains/starches, eating melon, more protein heavy  Random BG in clinic 1h PP today 113, ate burrito bowl with brown rice, soria beans, chicken and salsa    Last A1c value was 5.5% done 11/29/2023.  Previously trialed/failed DM meds: none    Of note- Jan 2024 labs: TSH 0.235, fT4 1.1, fT3 3.34  - pt reports vomiting during first trimester   - no personal/fam hx of thyroid disease    REVIEW OF SYSTEMS  Ten point review of systems has been performed and is otherwise negative and/or non-contributory, except as described above.     History/Other:   History  Medications:     Current Outpatient Medications:     Lancets Does not apply Misc, Use to test BG 4x/day., Disp: 400 each, Rfl: 2    Glucose Blood (BLOOD GLUCOSE TEST) In Vitro Strip, Use to test BG 4x/day, Disp: 400 strip, Rfl: 2    Blood Glucose Monitoring Suppl Does not apply Kit, Use to test BG., Disp: 1 kit, Rfl: 0    labetalol 100 MG Oral Tab, Take 1 tablet (100 mg total) by mouth 2 (two) times daily., Disp: 60 tablet, Rfl: 0    Naratriptan HCl 2.5 MG Oral Tab, Take 1 tablet (2.5 mg total) by mouth as needed for Migraine., Disp: 21 tablet, Rfl: 3    butalbital-acetaminophen-caffeine -40 MG Oral Tab, Take 1 tablet by mouth every 6 (six) hours as needed for Pain.,  Disp: 30 tablet, Rfl: 1    promethazine 12.5 MG Rectal Suppos, promethazine 12.5 MG Rectal Suppos, [RxNorm: 659000], Disp: , Rfl:     famotidine 20 MG Oral Tab, Take 1 tablet (20 mg total) by mouth 2 (two) times daily., Disp: 60 tablet, Rfl: 5    Prenatal MV-Min-Fe Fum-FA-DHA (PRENATAL 1 OR), Take by mouth., Disp: , Rfl:     ondansetron 4 MG Oral Tablet Dispersible, ondansetron 4 MG Oral Tablet Dispersible, [RxNorm: 737548], Disp: , Rfl:     venlafaxine ER 37.5 MG Oral Capsule SR 24 Hr, , Disp: , Rfl:     Cholecalciferol (VITAMIN D3) 1.25 MG (76530 UT) Oral Cap, Take 1 tablet by mouth once a week for 8 doses. Then switch to vitamin D3 4,000 IU daily in addition to prenatal vitamin., Disp: 8 capsule, Rfl: 0    prochlorperazine 25 MG Rectal Suppos, Place 1 suppository (25 mg total) rectally every 12 (twelve) hours as needed for Nausea., Disp: 12 each, Rfl: 1     Allergies:   Allergies   Allergen Reactions    Droperidol ANXIETY, RESTLESSNESS and Tightness in Chest       Social History:   Social History     Socioeconomic History    Marital status: Single   Tobacco Use    Smoking status: Never    Smokeless tobacco: Never   Vaping Use    Vaping Use: Never used   Substance and Sexual Activity    Alcohol use: Never    Drug use: Not Currently     Types: Cannabis     Comment: Recreaction in the past. NOT current.   Other Topics Concern    Caffeine Concern Yes     Comment: soda occ    Exercise No       Medical History:   Reviewed    Surgical history:   Past Surgical History:   Procedure Laterality Date    HC INJECT ANESTHETIC AGENT GREATER OCCIPITAL NERVE  01/10/2024    Bilateral greater occipital nerve block - Depo-medrol & 1% lidocaine - Dr. Barnes. Done at approx 12 wk gestation    LUMBAR PUNCTURE/SPINAL TAP(BEDSIDE)      mid 20s. Dx meningitis    OTHER SURGICAL HISTORY  07/2021 7/2021: cervical medial branch block C2-C5, seen in ED after procedure for dizziness, left sided facial numbness, worsening headache.     TONSILLECTOMY         Objective:   Vitals:  Vitals:    02/12/24 1518   BP: 128/80   Pulse: 93   SpO2: 98%       Physical exam:  General Appearance:  alert, well developed, in no acute distress  Eyes:  normal conjunctivae, sclera  Ears/Nose/Mouth/Throat/Neck:  no palpable thyroid nodules   Cardiovascular:  regular rate  Respiratory:  breathing comfortably  Musculoskeletal:  normal muscle strength and tone  Skin:  normal moisture and skin texture  Hair & Nails:  normal scalp hair     Psychiatric:  oriented to time, self, and place  Neuro:  sensory grossly intact and motor grossly intact    Labs/Imaging: Pertinent imaging reviewed.      Assessment & Plan:   (O24.410) Diet controlled gestational diabetes mellitus (GDM) in second trimester  (primary encounter diagnosis)  Plan: HemoCue Glucose 201 (Glucose blood test),         Lancets Does not apply Misc, Glucose Blood         (BLOOD GLUCOSE TEST) In Vitro Strip, Blood         Glucose Monitoring Suppl Does not apply Kit    Last A1c value was 5.5% done 11/29/2023. No previous DM history, now with newly diagnosed gestational DM in the setting of second trimester pregnancy.  1hPP in clinic to goal with patient's dietary adjustments. Has not seen educators yet.  Instructed to f/u with DM educators (Referral placed by OB) and MFM.  No meds needed. Can f/u with endo PRN post-delivery.     - glucometer sent with instructions to check 4x/day  - see DM educators  - chart sent to MFM  - counseling provided today re: carb goals, how to check BG using glucometer    (R79.89) Abnormal TSH  Plan: Biochemically with suppressed TSH on 1/2024 labs in setting of 1st trimester of pregnancy c/b hyperemesis.  Plan w/ OB is to repeat labs in 1 month which is anytime this week.  - pending repeat TSH with OBGYN, mgmt pending results but likely physiologic  - no personal or fam hx of thyroid condition      Return in about 6 months (around 8/12/2024) for post-delivery check in on A1C.    A total of  45 minutes was spent today on obtaining history, reviewing pertinent labs, evaluating patient, providing multiple treatment options, reinforcing diet/exercise and compliance, and completing documentation.      2/12/2024  HILARIO Pearson

## 2024-02-13 ENCOUNTER — TELEPHONE (OUTPATIENT)
Dept: PERINATAL CARE | Facility: HOSPITAL | Age: 34
End: 2024-02-13

## 2024-02-13 DIAGNOSIS — O24.410 DIET CONTROLLED GESTATIONAL DIABETES MELLITUS (GDM) IN SECOND TRIMESTER: Primary | ICD-10-CM

## 2024-02-13 NOTE — PROGRESS NOTES
Discussed providers message: results--Glucose is elevated, otherwise normal results., patient verbalized understanding.

## 2024-02-14 LAB
AFP MOM: 1.45
AFP VALUE: 47.1 NG/ML
GA ON COLL DATE: 17.4 WEEKS
INSULIN DEP AFP: NO
MAT AGE AT EDD: 32.8 YR
MULTIPLE GEST AFP: NO
OSBR RISK 1 IN AFP: 3133
WEIGHT AFP: 217 LBS

## 2024-02-19 ENCOUNTER — NURSE ONLY (OUTPATIENT)
Dept: ENDOCRINOLOGY CLINIC | Facility: CLINIC | Age: 34
End: 2024-02-19
Payer: MEDICAID

## 2024-02-19 ENCOUNTER — TELEPHONE (OUTPATIENT)
Dept: ENDOCRINOLOGY CLINIC | Facility: CLINIC | Age: 34
End: 2024-02-19

## 2024-02-19 DIAGNOSIS — O24.410 DIET CONTROLLED GESTATIONAL DIABETES MELLITUS (GDM), ANTEPARTUM (HCC): Primary | ICD-10-CM

## 2024-02-19 PROCEDURE — 99211 OFF/OP EST MAY X REQ PHY/QHP: CPT | Performed by: INTERNAL MEDICINE

## 2024-02-19 RX ORDER — LANCETS 30 GAUGE
1 EACH MISCELLANEOUS 4 TIMES DAILY
Qty: 200 EACH | Refills: 3 | Status: SHIPPED | OUTPATIENT
Start: 2024-02-19

## 2024-02-19 RX ORDER — BLOOD-GLUCOSE METER
1 EACH MISCELLANEOUS AS DIRECTED
Qty: 1 KIT | Refills: 0 | Status: SHIPPED | OUTPATIENT
Start: 2024-02-19 | End: 2024-03-27 | Stop reason: ALTCHOICE

## 2024-02-19 RX ORDER — BLOOD SUGAR DIAGNOSTIC
STRIP MISCELLANEOUS
Qty: 150 STRIP | Refills: 3 | Status: SHIPPED | OUTPATIENT
Start: 2024-02-19 | End: 2024-03-04

## 2024-02-19 NOTE — TELEPHONE ENCOUNTER
Nellie called about testing supplies rx that was sent - pt just picked up supplies a couple days ago and they were wondering if it was a duplicate rx. No note from vlaentina about this - looks like order was signed by Dr. Schneider and sent by Valentina in [place]. Pt is seeing endo - Dr. Burden who sent previous testing supplies    Talked to valentina - she did not send the supplies.    Called pt - she did  supplies a couple days ago, she was also contacted by the pharmacy about this and was informed duplicate supplies were sent.    Called pharmacy - told to disregard the duplicate rx as pt is good on supplies

## 2024-02-19 NOTE — PROGRESS NOTES
Tammy Ramirez  :1990 was seen for Gestational Diabetes Counseling: Individual/Group  verbally consents to a telemedicine service with live, interactive video and audio on 22. Patient understands and accepts financial responsibility for any deductible, co-insurance and/or co-pays associated with this service.   Date: 2024  Referring Provider: Dr. KIRK Casanova  Start time: 3pm End time: 4pm    Assessment:LMP 2023 (Exact Date)     : 1 Para: 0  FRANCIS: 24  History of GMD:NO    GDM Screen: 162 mg/dl    3 HR GTT:  F:95 mg/dl  1 HR: 198 mg/dl  2 HR:170 mg/dl  3 HR:165 mg/dl      Not currently working    Obtained usual diet history: Eats 3 meals/day   B:6:30-7:30am eggs w/veggies w/ toast (  whole wheat) & cottage cheese  Snack: hard-boiled or 1/2 slice of toast  L: 11-12pm tuna sandwich on ww bread with tomatoes & lettuce   Snack: yogurt (greek )w/BB  D: 5-6:30pm chx, potatoes & corn 2x/wk , pasta stopped 1x/ (ww.), no rice (Chipotle)  Eats out 2x/wk : dinner    Exercise: none    Education:     GDM Overview:  Reviewed gestational diabetes as diagnosis including target blood glucose values.  Benefits, risks, and management options for improving/maintaining glucose control to mother/baby discussed.    Healthy Eating:  Discussed nutrition concepts for pregnancy/healthy eating and effects of food on BG value.  Timing of meals; what is a carbohydrate, protein, fat.  Taught: Carb counting, label reading, meal planning.  Suggested Calorie Level: 2000    Being Active:  Benefits and effects of activity on BG discussed.  Reviewed types of recommended activity, duration, precautions, and when to call MD.    Monitoring:  Instructed on how to use glucose monitor/proper lancet disposal. Testing schedules are:   Fastin-95 mg/dL, Call MD is >105 md/dL twice in 1 week   2 Hour Post Prandial:  120 md/dL, Call MD if >140 md/dL twice in 1 week.  Pt. already checking blood sugar levels  Discussed  monitoring ketones.    Taking Medication:  Reviewed when medication might be indicated.    Reducing Risk:  Effects of elevated blood glucose on mother/baby reviewed.    Training Tools Provided:  [facility] Diabetes and Pregnancy: A guide to self management  BG Log Sheets    Recommendations:      1. Follow recommended meal plan.   2. Begin testing fasting glucose and 2 hour after meals   3. Bring glucose / food log to next MD office visit.   4. Encouraged activity if no restrictions.   5. Encouraged Tammy to call diabetes center with any questions or concerns.   6. If fasting glucose is above 105 mg/dL or after meal above 140 mg/dL for 2 days in a row please call your OB   7. Follow-up in 2 wks       Patient verbalized understanding and has no further questions at this time.    Fior Castro RN, [facility]

## 2024-02-20 ENCOUNTER — ROUTINE PRENATAL (OUTPATIENT)
Facility: CLINIC | Age: 34
End: 2024-02-20
Payer: MEDICAID

## 2024-02-20 VITALS
DIASTOLIC BLOOD PRESSURE: 72 MMHG | HEART RATE: 94 BPM | WEIGHT: 219 LBS | SYSTOLIC BLOOD PRESSURE: 122 MMHG | BODY MASS INDEX: 38 KG/M2

## 2024-02-20 DIAGNOSIS — G43.711 CHRONIC MIGRAINE WITHOUT AURA, WITH INTRACTABLE MIGRAINE, SO STATED, WITH STATUS MIGRAINOSUS: ICD-10-CM

## 2024-02-20 DIAGNOSIS — O10.919 CHRONIC HYPERTENSION AFFECTING PREGNANCY (HCC): Primary | ICD-10-CM

## 2024-02-20 PROCEDURE — 99214 OFFICE O/P EST MOD 30 MIN: CPT

## 2024-02-20 RX ORDER — MAGNESIUM 200 MG
400 TABLET ORAL DAILY
COMMUNITY

## 2024-02-20 RX ORDER — MELATONIN
50 DAILY
COMMUNITY

## 2024-02-20 NOTE — PROGRESS NOTES
KYLEIGH 18w4d    She is doing ok, coping well with her migraines - taking Fiorcet as needed.   -she has not been checking her blood pressure at home - all of her readings are higher than in the office, she has brought her BP cuff to compare and still has higher readings at home.     FRANCIS 7/19/24 by 6w3d US not c/w LMP   O+     GIRL  -NIPS neg   -NT normal   -carrier neg   -AFP negative   -Flu 23-24 - declines     Elevated BP/Labile - recommend surveillance as CHTN  -Probable underlying Chronic HTN (pt thought HA causing high BP, but may be other way around)   -Never formally diagnosed with CHTN, but multiple high readings prior to pregnancy  -per EMR intermittently hypertensive even at routine office visits since 2018   -1/6/22 /96 (pre-pregnancy office visit)   -6/5/23 /90 (pre-pregnancy office visit)   -10/30/23 /88 (very early 1st trim office visit)   -1st OB visit - 6w3d /97, repeat 120/77   -Early consult & NT US with MFM - normal. /88   -aspirin recommended - taking 81 mg tab x 2 daily.   -baseline EKG - 1/21 - ABNORMAL. Cannot rule out anterior infarct  -baseline CMP & urine P/C WNL  -baseline TSH 0.235 (L) - 12/24  -1/24/24 - /90 in office at 14w5d (NO CURRENT HA.) Patient reports BP has been high at home even 150s/100s.   -BP monitoring at home.   -recommend BP at least 1x/day (can do 2x/day) - to establish baseline & pattern, association/timing of HA  -asked pt to submit BP log 1-2 times per week per MyChart -is not checking   -is currently taking Labetalol 100 mg BID   -L2 US appt 3/1/24  -growth US & BPP at 32 wk per MF   -NSTs at 32 wk      ABNORMAL EKG - 1/21/24  -EKG that has some changes that appear new when compared with the 1/13/23 EKG. Report reads cannot rule out anterior infarct. Sometimes the EKG looks different purely due to pregnancy, but to be on the safe side, would recommend seeing cardiologist for an opinion & to establish care.   -consult for Dr. Paradise Paul  ordered. Has scheduled appt for one of her partners - per pt received an EKG in the office, normal.      Low TSH with normal FT4 & FT3  -this is common in early pregnancy from high HCG level  -we don't treat this pattern of thyroid labs  -repeat TSH in 2nd trimester       Diagnosed with GDM  at 16 wks  -has seen DM educator and is sending her BS to [facility] for review      Vitamin D deficiency  -1/6/24 Vit D 12.8 at 12 wk   -optimal dosing in pregnancy is controversial, but given starting out very low, Rx 50,000 international units weekly for 8 weeks, then switch to 4000 units daily over the counter in addition to prenatal vitamin      Obesity (pre preg BMI 36)   -limit weight gain 11-20 lb   -Early 1 hr GTT Failed -> 3 hr GTT failed   -L2US, growth US, NSTs per [facility] recommendations     H/o intractable migraines  -onset age 11.  20 or more migraines per month.   -only 4-5 hours of sleep on regular basis   -MRI C spine 2020 - Mild multilevel disc desiccation and bulging without significant loss of intervertebral disc space height. No significant spinal canal or neuroforaminal stenosis.   -MRI brain 2/12/21 - No acute intracranial abnormality.   -MRI c spine 8/21 - unremarkable. Done for left-sided facial and arm   numbness and radiculopathy.   -Past treatments: Botox, Nurtec, Aimovig, Ajovy, Amitriptyline, nortriptyline, propranolol, Depakote, Topamax, venlafaxine, Norco, Fioricet, multiple triptans, flexeril, and OTC analgesics. She has had facet and cervical epidurals as well as SPG block none of which provided much relief. Osteopathic manipulation therapy, PT, chiropractic, massage acupuncture, magnesium, CoQ10, vitamin B complex, elimination diet.   -Botox helped partially in the past   -Neurologist Dr. Robe Gutierrez - currently taking Naratriptan 2.5 mg PRN  -Took Qulipta x 1 week & seemed to be working - stopped when found out she is pregnant.   -per patient, when she notified Dr. Gutierrez she is pregnant, he is  no longer comfortable managing her migraine medication.   -She has an appointment to see a new neurologist.   -Pain management 12/7/23 bilateral occipital neuralgia. Migraines originate from the occipital region, L>R. Bilateral occipital block - is waiting for insurance approval.   -1/10/24 - Bilateral greater occipital nerve block done. Improvement for 24 hr.        CHEYANNE, insomnia  -meds: none currently. H/o zoloft, clonazepam, venlafaxine   -therapist - not currently. Not very interested. Resources given      H/o PVCs   -Fhx Brugada syndrome - she tested negative   -3/31/2021 TTE - LVEF 55-60%. Unremarkable  -Holter monitor evaluation 4/2021 - was told her PVCs are minor and offered her medications if she wanted it - she declined - feeling fine.     NVP  -has resolved      Bloating, constipation/difficulty evacuating stools  -encouraged increased fiber & water  -possible pelvic floor dysfunction- attending PT therapy      Pap neg, +HPV 18/45 (11/27/23)  -colposcopy done 2/7/24 by BREN - results normal

## 2024-02-22 ENCOUNTER — TELEPHONE (OUTPATIENT)
Dept: PHYSICAL THERAPY | Facility: HOSPITAL | Age: 34
End: 2024-02-22

## 2024-02-23 NOTE — PROGRESS NOTES
Outpatient Maternal-Fetal Medicine Follow-Up    Dear Dr. Casanova    Thank you for requesting an ultrasound and maternal-fetal medicine consultation on your patient Tammy Ramirez.  As you are aware she is a 33 year old female  with a dillon pregnancy and an Estimated Date of Delivery: 24.  She returned to maternal-fetal medicine today for a follow-up visit.  Her history was reviewed from her prior visit and there were no interval changes.    Antepartum Risk Factors  Class II obesity complicating pregnancy  Chronic migraines  Hypertension related to severe migraine    PHYSICAL EXAMINATION:  /81 (BP Location: Right arm, Patient Position: Sitting, Cuff Size: large)   Pulse 98   Ht 5' 4\" (1.626 m)   Wt 218 lb (98.9 kg)   LMP 2023 (Exact Date)   BMI 37.42 kg/m²   General: alert and oriented, no acute distress  Abdomen: gravid, soft, non-tender  Extremities: non-tender, no edema    OBSTETRIC ULTRASOUND  The patient had a follow-up ultrasound today which revealed size consistent with dates and a normal detailed anatomic survey.      Ultrasound Findings:  Single IUP in cephalic presentation.    Placenta is anterior.   A 3 vessel cord is noted.  Cardiac activity is present at 146 bpm   g ( 0 lb 13 oz);   MVP is 3.8 cm .     The fetal measurements are consistent with the established EDC. No ultrasound evidence of structural abnormalities are seen today. The nasal bone is present. No ultrasound evidence of markers for aneuploidy are seen. She understands that ultrasound exam cannot exclude genetic abnormalities and that genetic testing is recommended. The limitations of ultrasound were discussed.     Uterus and adnexa appeared normal  today on US    I interpreted the results and reviewed them with the patient.    DISCUSSION  During her visit we discussed and reviewed the following issues:  OBESITY  See prior [facility] notes for a detailed review.    MIGRAINES  See prior [facility] notes for a  detailed review.    GESTATIONAL DIABETES    3 hour GTT  Lab Results   Component Value Date    GLUFG 95 (H) 02/03/2024    GLU1G 198 (H) 02/03/2024    GLU2G 170 (H) 02/03/2024    GLU3G 135 02/03/2024       The patient was informed of the potential implications and risks associated with GDM to her and her fetus, especially when poorly controlled. We discussed the increased incidence of macrosomia and the potential for related birth injury to her and her baby. We talked about the increase risks associated risk of fetal hyperinsulinemia, jaundice, electrolyte imbalance, seizure activity, IUFD and other adverse obstetric outcomes. We also reviewed her  increased risk of having diabetes later in life. The importance of good glycemic control and avoidance of prolonged hypoglycemia and hyperglycemia was addressed.    She was instructed on the diabetic diet; her diet was modified to provide three meals and three snacks with fewer carbohydrates.  She received instruction on self-monitoring of blood glucose.  She was advised to assess  her blood sugars four times daily (fasting and 2 hour postprandially).   Fasting blood glucose should be less than 95 mg/dl and two hour postprandial values should be less than 120 mg/dl.   She was also  Advised to send her capillary blood glucose records to our office for weekly MFM review. Insulin therapy may be started depending on her blood sugar levels.    Her capillary blood glucose records were not reviewed today; last log 2/27/2024   Her overall glucose control is unable to assess    Her compliance with her insulin regimen was reviewed and it is adequate - just started 2 days ago due to difficulty  obtaining from pharmacy  Her current insulin regimen is:  glargine (or Tresiba) 10 units q HS       IMPRESSION:  IUP at 20w0d  Class II obesity complicating pregnancy  Chronic migraines  Hypertension related to severe migraine  GDM A2     RECOMMENDATIONS:  Continue care with   Mitono  Continue insulin  Continue four times daily capillary blood glucose assessments (fasting and 2 hour postprandial)  Weekly Maternal-Fetal Medicine review of capillary blood glucose values  Follow-up growth ultrasound every 4 weeks in the third trimester  Weekly NSTs at 32 weeks; twice weekly NST's at 34 weeks  Delivery at 38-39  weeks advised for suboptimally medication controlled diabetes  Delivery at 39-39w6d for well controlled diabetes.  She is to continue to follow with the pain management team for her migraines  Low-dose aspirin, 2 tablets (162 mg total daily)  Limit gestational weight gain to 20 pounds or less    Thank you for allowing me to participate in the care of your patient.  Please do not hesitate to contact me if additional questions or concerns arise.      Tyler Marroquin M.D.    40 minutes spent in review of records, patient consultation, documentation and coordination of care.  The relevant clinical matter(s) are summarized above.     Note to patient and family  The 21st Century Cures Act makes medical notes available to patients in the interest of transparency.  However, please be advised that this is a medical document.  It is intended as [id number] communication.  It is written and medical language may contain abbreviations or verbiage that are technical and unfamiliar.  It may appear blunt or direct.  Medical documents are intended to carry relevant information, facts as evident, and the clinical opinion of the practitioner.

## 2024-02-27 ENCOUNTER — PATIENT MESSAGE (OUTPATIENT)
Dept: PERINATAL CARE | Facility: HOSPITAL | Age: 34
End: 2024-02-27

## 2024-02-27 ENCOUNTER — TELEPHONE (OUTPATIENT)
Dept: PERINATAL CARE | Facility: HOSPITAL | Age: 34
End: 2024-02-27

## 2024-02-27 ENCOUNTER — TELEPHONE (OUTPATIENT)
Facility: CLINIC | Age: 34
End: 2024-02-27

## 2024-02-27 RX ORDER — INSULIN GLARGINE 100 [IU]/ML
10 INJECTION, SOLUTION SUBCUTANEOUS NIGHTLY
Qty: 15 ML | Refills: 2 | Status: SHIPPED | OUTPATIENT
Start: 2024-02-27

## 2024-02-27 NOTE — TELEPHONE ENCOUNTER
19w4d  Received BS log for date range 2/21-2/26     Low  High Out of Range   Fasting Blood Sugar 91 107 4/6   Post Breakfast 91 103 0/6   Post Lunch 81 105 0/5   Post Dinner 98 137 3/5     Patient is currently diet controlled.   To Dr. Rosado for review.

## 2024-02-27 NOTE — TELEPHONE ENCOUNTER
Due ot her elevated FBS; I recommend that she be started on insulin in the evenings.  Begin glargine (or Tresiba) 10 units q HS

## 2024-03-01 ENCOUNTER — ULTRASOUND ENCOUNTER (OUTPATIENT)
Dept: PERINATAL CARE | Facility: HOSPITAL | Age: 34
End: 2024-03-01
Attending: OBSTETRICS & GYNECOLOGY
Payer: MEDICAID

## 2024-03-01 VITALS
WEIGHT: 218 LBS | BODY MASS INDEX: 37.22 KG/M2 | DIASTOLIC BLOOD PRESSURE: 81 MMHG | SYSTOLIC BLOOD PRESSURE: 125 MMHG | HEART RATE: 98 BPM | HEIGHT: 64 IN

## 2024-03-01 DIAGNOSIS — O16.9 HYPERTENSION AFFECTING PREGNANCY (HCC): ICD-10-CM

## 2024-03-01 DIAGNOSIS — O16.2 HYPERTENSION AFFECTING PREGNANCY IN SECOND TRIMESTER (HCC): ICD-10-CM

## 2024-03-01 DIAGNOSIS — O24.414 INSULIN CONTROLLED GESTATIONAL DIABETES MELLITUS (GDM) IN SECOND TRIMESTER (HCC): ICD-10-CM

## 2024-03-01 DIAGNOSIS — O99.212 OTHER OBESITY AFFECTING PREGNANCY IN SECOND TRIMESTER (HCC): ICD-10-CM

## 2024-03-01 DIAGNOSIS — O99.210 OBESITY AFFECTING PREGNANCY (HCC): ICD-10-CM

## 2024-03-01 DIAGNOSIS — O24.419 GDM (GESTATIONAL DIABETES MELLITUS) (HCC): ICD-10-CM

## 2024-03-01 DIAGNOSIS — E66.8 OTHER OBESITY AFFECTING PREGNANCY IN SECOND TRIMESTER (HCC): ICD-10-CM

## 2024-03-01 DIAGNOSIS — O24.419 GDM (GESTATIONAL DIABETES MELLITUS) (HCC): Primary | ICD-10-CM

## 2024-03-01 PROCEDURE — 76811 OB US DETAILED SNGL FETUS: CPT | Performed by: OBSTETRICS & GYNECOLOGY

## 2024-03-03 PROBLEM — O24.414 INSULIN CONTROLLED GESTATIONAL DIABETES MELLITUS (GDM) IN SECOND TRIMESTER (HCC): Status: ACTIVE | Noted: 2024-02-07

## 2024-03-03 PROBLEM — O24.414 INSULIN CONTROLLED GESTATIONAL DIABETES MELLITUS (GDM) DURING PREGNANCY, ANTEPARTUM (HCC): Status: ACTIVE | Noted: 2024-02-07

## 2024-03-04 ENCOUNTER — NURSE ONLY (OUTPATIENT)
Dept: ENDOCRINOLOGY CLINIC | Facility: CLINIC | Age: 34
End: 2024-03-04
Payer: MEDICAID

## 2024-03-04 DIAGNOSIS — O24.410 DIET CONTROLLED GESTATIONAL DIABETES MELLITUS (GDM), ANTEPARTUM (HCC): ICD-10-CM

## 2024-03-04 DIAGNOSIS — O24.410 DIET CONTROLLED GESTATIONAL DIABETES MELLITUS (GDM), ANTEPARTUM (HCC): Primary | ICD-10-CM

## 2024-03-04 PROCEDURE — G0108 DIAB MANAGE TRN  PER INDIV: HCPCS | Performed by: DIETITIAN, REGISTERED

## 2024-03-04 RX ORDER — LANCETS 30 GAUGE
1 EACH MISCELLANEOUS
Qty: 200 EACH | Refills: 3 | Status: SHIPPED | OUTPATIENT
Start: 2024-03-04

## 2024-03-04 RX ORDER — BLOOD SUGAR DIAGNOSTIC
STRIP MISCELLANEOUS
Qty: 200 STRIP | Refills: 2 | Status: SHIPPED | OUTPATIENT
Start: 2024-03-04 | End: 2024-03-18

## 2024-03-05 NOTE — PROGRESS NOTES
Tammy Ramirez  : 1990 was seen for GDM  Individual Follow-Up Counseling  Pt. verbally consents to a telemedicine service with live, interactive video and audio on 3/4/24. Patient understands and accepts financial responsibility for any deductible, co-insurance and/or co-pays associated with this service.     Date: 3/4/2024  Referring Provider: Dr. KIRK Casanova  Start time: 3pm End time: 3:30pm    Assessment: LMP 2023 (Exact Date)   Weight:   Wt Readings from Last 6 Encounters:   24 218 lb   24 219 lb 0.2 oz   24 217 lb 9.6 oz   24 217 lb 9.6 oz   24 214 lb   24 211 lb   BLOOD GLUCOSE TRENDS: 24- 3/4/24    Fasting - 94 mg/dl - 112 mg/dl (2number(s) out of target)    2 hr post bkfst - 98 mg/dl - 103 mg/dl (0 number(s) out of target)    2 hour post lunch - 72 mg/dl - 118 mg/dl (0 number(s) out of target)    2 hour post dinner/bed -104 mg/dl - 123 mg/dl (1 number(s) out of target)  Comment: Pt. States that sometimes if she is running errands, she will wait to eat instead of eating fast food. I will send snack ideas. She has tried walking & is checking extra to know where her blood sugars are running.     EDUCATION:                                                                                                                         MONITORING    Interpreting results/pattern management: fastings are elveated despite eating a bedtime snack & walking              TAKING MEDICATION  ORAL MEDICATIONS: Instructed about 2 oral medications approved for use during pregnancy;Metformin & Glyburide (discussed possible side effects)  INSULIN:  Instructed about insulin in the event she needs insulin for BG control      Type of insulin, action time, precautions, storage                                                                Proper Sharps disposal                                                                                                 PROBLEM SOLVING     HYPERGLYCEMIA(High blood glucose level:  <120 mg/dL)    Causes and S&Sx, Prevention    POSTPARTUM CARE                                                                          Effect of GDM on future pregnancies and importance of preconception counseling    Importance of  weight loss and exercise to prevent type 2 DM later in life    SMBG fasting, pre meal and 2 hr p.p. 2 days a week for 6 weeks    Importance of having FPG at yearly exam      REDUCING RISKS     Relationship between glucose control and risk for complications       Blood Pressure Control        Recommendations:      - Healthy Eating:         Follow recommended meal plan.      Record meals/snacks on food/BG log    - Being Active:          Walk 10-15  minutes after all meals if possible    - Monitoring:          BG testing 4x/ day: Fasting and 2 hr post meals; record on log and bring to all appt w/ meter               Send via fax/ e-mail, BG/food log report  to OB/GYN- as instructed    - Problem Solving:          Detect, treat, prevent hypo/hyperglycemia appropriately    - Reducing Risks:          Preventing type 2 diabetes by losing weight, healthy eating and exercise       Keep blood glucose within goals for healthy outcomes for mother/baby          - Follow-up as needed     Patient verbalized understanding and has no further questions at this time.    Fior Castro RN, [facility]

## 2024-03-05 NOTE — TELEPHONE ENCOUNTER
Received fax from pharmacy patient is using one touch ultra not verio please confirm which strips to send

## 2024-03-06 ENCOUNTER — TELEPHONE (OUTPATIENT)
Dept: PERINATAL CARE | Facility: HOSPITAL | Age: 34
End: 2024-03-06

## 2024-03-06 RX ORDER — INSULIN GLARGINE 100 [IU]/ML
24 INJECTION, SOLUTION SUBCUTANEOUS NIGHTLY
Qty: 15 ML | Refills: 2 | Status: SHIPPED | OUTPATIENT
Start: 2024-03-06

## 2024-03-06 NOTE — TELEPHONE ENCOUNTER
20w5d  Received BS log for date range 2/27-3/5     Low  High Out of Range   Fasting Blood Sugar 93 112 4/7   Post Breakfast 96 119 0/7   Post Lunch 72 125 1/7   Post Dinner 104 124 1/6     Patient is currently taking:    Lantus 10 units HS     To Dr. Pratt for review.

## 2024-03-08 RX ORDER — BLOOD-GLUCOSE METER
1 EACH MISCELLANEOUS AS DIRECTED
Qty: 1 KIT | Refills: 0 | Status: SHIPPED | OUTPATIENT
Start: 2024-03-08

## 2024-03-08 RX ORDER — ACYCLOVIR 400 MG/1
1 TABLET ORAL
Qty: 3 EACH | Refills: 11 | Status: SHIPPED | OUTPATIENT
Start: 2024-03-08

## 2024-03-08 NOTE — TELEPHONE ENCOUNTER
Received message from Tyler at [facility] in [place] [phone] -    They saw the patient's prescription had been increased in frequency of testing, but that the test strips were different than what she had previously been prescribed (OneTouch Ultra), was wondering if they could re-fill per per that last prescription or we wanted to update to the new brand of OneTouch Verio?

## 2024-03-08 NOTE — TELEPHONE ENCOUNTER
Contacted pharmacy regarding new prescription for diabetes test strips. Explained that pt. prefers to have a meter that go to an leonarda on her phone; Onetouch Ultra 2 does not.

## 2024-03-11 ENCOUNTER — TELEPHONE (OUTPATIENT)
Dept: NEUROLOGY | Facility: CLINIC | Age: 34
End: 2024-03-11

## 2024-03-11 ENCOUNTER — TELEPHONE (OUTPATIENT)
Dept: ENDOCRINOLOGY CLINIC | Facility: CLINIC | Age: 34
End: 2024-03-11

## 2024-03-11 ENCOUNTER — TELEPHONE (OUTPATIENT)
Facility: CLINIC | Age: 34
End: 2024-03-11

## 2024-03-11 NOTE — TELEPHONE ENCOUNTER
Receive liest from from [facility] of human services div of [facility] services disability determination send to scan stat

## 2024-03-11 NOTE — TELEPHONE ENCOUNTER
Dexcom 7 sensor and One touch verio medications needs PA per pharmacy,  [facility]ing [facility] if Pt needs 6 test strips a day?

## 2024-03-11 NOTE — TELEPHONE ENCOUNTER
Per jaya from [facility] pharmacy the 2 medications ordered by dr LYNN are not covered by her ins. Please call back with other options. Thanks

## 2024-03-11 NOTE — TELEPHONE ENCOUNTER
Another call from pharmacy regarding prior authorization on test strips and dexcom.  Provided number to OB/GYN office for PA request.

## 2024-03-12 RX ORDER — LABETALOL 100 MG/1
100 TABLET, FILM COATED ORAL 2 TIMES DAILY
Qty: 60 TABLET | Refills: 0 | Status: SHIPPED | OUTPATIENT
Start: 2024-03-12

## 2024-03-13 ENCOUNTER — TELEPHONE (OUTPATIENT)
Dept: FAMILY MEDICINE CLINIC | Facility: CLINIC | Age: 34
End: 2024-03-13

## 2024-03-13 NOTE — TELEPHONE ENCOUNTER
RECEIVED:  3/12/24    SENT TO [facility]:  3/13/24    [phone]    ALL RECORDS FROM 1/1/22 TO PRESENT      RESPOND BY 3/21/24

## 2024-03-18 ENCOUNTER — TELEPHONE (OUTPATIENT)
Facility: CLINIC | Age: 34
End: 2024-03-18

## 2024-03-18 ENCOUNTER — ROUTINE PRENATAL (OUTPATIENT)
Facility: CLINIC | Age: 34
End: 2024-03-18
Payer: MEDICAID

## 2024-03-18 VITALS
HEIGHT: 64 IN | SYSTOLIC BLOOD PRESSURE: 122 MMHG | BODY MASS INDEX: 37.78 KG/M2 | WEIGHT: 221.31 LBS | HEART RATE: 97 BPM | DIASTOLIC BLOOD PRESSURE: 78 MMHG

## 2024-03-18 DIAGNOSIS — O99.891 STRESS INCONTINENCE DURING PREGNANCY (HCC): ICD-10-CM

## 2024-03-18 DIAGNOSIS — R87.619 ABNORMAL CERVICAL PAPANICOLAOU SMEAR AFFECTING PREGNANCY IN SECOND TRIMESTER (HCC): ICD-10-CM

## 2024-03-18 DIAGNOSIS — F41.9 ANXIETY DISORDER AFFECTING PREGNANCY, ANTEPARTUM (HCC): ICD-10-CM

## 2024-03-18 DIAGNOSIS — O99.212 OTHER OBESITY DUE TO EXCESS CALORIES AFFECTING PREGNANCY IN SECOND TRIMESTER (HCC): ICD-10-CM

## 2024-03-18 DIAGNOSIS — O24.414 INSULIN CONTROLLED GESTATIONAL DIABETES MELLITUS (GDM) IN SECOND TRIMESTER (HCC): ICD-10-CM

## 2024-03-18 DIAGNOSIS — O34.42 ABNORMAL CERVICAL PAPANICOLAOU SMEAR AFFECTING PREGNANCY IN SECOND TRIMESTER (HCC): ICD-10-CM

## 2024-03-18 DIAGNOSIS — E05.90 SUBCLINICAL HYPERTHYROIDISM: ICD-10-CM

## 2024-03-18 DIAGNOSIS — E66.09 OTHER OBESITY DUE TO EXCESS CALORIES AFFECTING PREGNANCY IN SECOND TRIMESTER (HCC): ICD-10-CM

## 2024-03-18 DIAGNOSIS — K59.00 CONSTIPATION DURING PREGNANCY IN SECOND TRIMESTER (HCC): ICD-10-CM

## 2024-03-18 DIAGNOSIS — O26.892 HEARTBURN DURING PREGNANCY IN SECOND TRIMESTER (HCC): ICD-10-CM

## 2024-03-18 DIAGNOSIS — M62.89 PELVIC FLOOR DYSFUNCTION: ICD-10-CM

## 2024-03-18 DIAGNOSIS — G47.00 INSOMNIA, UNSPECIFIED TYPE: ICD-10-CM

## 2024-03-18 DIAGNOSIS — R12 HEARTBURN DURING PREGNANCY IN SECOND TRIMESTER (HCC): ICD-10-CM

## 2024-03-18 DIAGNOSIS — N39.3 STRESS INCONTINENCE DURING PREGNANCY (HCC): ICD-10-CM

## 2024-03-18 DIAGNOSIS — O99.612 CONSTIPATION DURING PREGNANCY IN SECOND TRIMESTER (HCC): ICD-10-CM

## 2024-03-18 DIAGNOSIS — O10.919 CHRONIC HYPERTENSION AFFECTING PREGNANCY (HCC): Primary | ICD-10-CM

## 2024-03-18 DIAGNOSIS — O99.340 ANXIETY DISORDER AFFECTING PREGNANCY, ANTEPARTUM (HCC): ICD-10-CM

## 2024-03-18 RX ORDER — MULTIVIT-MIN/IRON/FOLIC ACID/K 18-600-40
CAPSULE ORAL 2 TIMES DAILY
COMMUNITY

## 2024-03-18 RX ORDER — HYDROXYZINE HYDROCHLORIDE 25 MG/1
TABLET, FILM COATED ORAL 3 TIMES DAILY PRN
Qty: 30 TABLET | Refills: 11 | Status: SHIPPED | OUTPATIENT
Start: 2024-03-18

## 2024-03-18 NOTE — PATIENT INSTRUCTIONS
[facility] Prenatal Classes (and virtual tour of Labor & Delivery unit)  [email or web address]  [phone]    Pre-registration for the hospital  [email or web address]    Breast pump  -contact your insurance to request them to send an order to us for their preferred medical supply company  Or  -contact [employer] (flyer available on the lobby wall across from reception desk)   [email or web address]    Spectra & Medela are popular brands     Car seat *MUST* be installed before infant(s) can be leave the hospital    Doctor for baby   *Please select a pediatrician or family medicine provider BEFORE you are admitted to the hospital to give birth.   Pediatrics (birth-18 years of age) or Family Medicine (birth through adulthood)  Make sure that provider accepts your insurance.   Pick a provider that is close to where you live.  If the provider is on staff at [place], the nursing staff will notify them when your infant is born so they are aware to come to the hospital to examine the infant.   If the provider you have chosen is not on staff at [place], a pediatric hospitalist will care for your infant during the hospitalization only. You must arrange the follow up visit with your provider.   After delivery at the hospital follow up visit instructions for baby will be provided prior to discharge.     The baby will not be able to leave the hospital without a follow up visit scheduled.     To establish care:  [email or web address]  Or   [facility] Physician Referral line at [phone]    There are MANY providers available. It would be impossible to list them all, but here are a few popular pediatrics groups in [place]:    [facility] [place] [phone]  [facility] [place] [phone]  [facility] [place] [phone]  [facility] [place] [phone]  [facility] [place] [phone]  [facility] [phone]    There are also many wonderful independent practitioners as well. We recommend you speak with family, friends, colleagues as personal recommendations can be very helpful.

## 2024-03-18 NOTE — PROGRESS NOTES
KYLEIGH    Partner & his child are here.     +FM. Occasional mild cramping if moving a certain way. Probably some round ligament pain. Short lived. No bleeding or spotting.     Migraines have started to improve since around 19 wk - Of note was prescribed labetalol to start around 17 wk gestation.   Has only had moderate headaches - maybe 1 time per week. Only had to take 0.5 tablet Naratriptan.     Labetalol 100 mg PO BID - taking   Home BP - 122-146/75-89 (only 1 reading was 146 systolic). Brought in BP log today.   Vision ok  Epigastric pain - ok  Heartburn at night - pepcid - has only been taking it 1 time per day before bed. BID recommended  Sleep somewhat an issue always. A little better while not having migraines.     Vitals:    24 1654   BP: 122/78   Pulse: 97   Weight: 221 lb 4.8 oz (100.4 kg)   Height: 64\"     33 year old  at 22w3d   FRANCIS 24 by 6w3d US not c/w LMP   O+    GIRL  -NIPS neg, NT normal.    -carrier neg   -AFP low risk    -Flu  -  COVID-19 booster encouraged   -classes, pre-registration, pediatrician (Dr. Long), breast pump, car seat discussed   -H/o spinal headaches after epidural     #Chronic Hypertension - started on labetalol at 17 wk   -Never formally diagnosed with CHTN, but multiple high readings prior to pregnancy (per EMR since at least ). See previous OB notes. Hypertensive even at 6 wk  -aspirin recommended - taking 81 mg tab x 2 daily   -Baseline EKG -  - ABNORMAL. Cannot rule out anterior infarct   -Baseline CMP & urine P/C normal. TSH 0.235 (L) -   -24 - /90 - 14w5d (NO CURRENT HA.) Patient reports BP has been high at home even 150s/100s.   -Recommend surveillance as if chronic HTN - reviewed potential for placental damage, fetal growth restriction, stillbirth. M Dr. Rosado in agreement    -BP monitoring at home   -24 /98 - 16w5d - Rx labetalol 100 mg BID   -L2 US normal   -Growth US q 4 wk in 3rd trim (with BPP  at or beyond 32 wk) - ordered   Appt  4/26    -Weekly NSTs at 32 weeks; twice weekly NST's at 34 wk    #ABNORMAL EKG - 1/21/24  -1/21/24 - EKG report reads cannot rule out anterior infarct. Appears changed from 1/13/23 EKG.   -Cardiology 2/1/24 Dr. Martinez.    Dr. Martinez says 1/21/24 EKG was just low voltage in lead V3, no infarct  2/1/24 EKG - NSR, right axis deviation, no ischemia, borderline  2/19/24 Holter monitor x 1 week - NSR (HR  bpm, mean 92). No Afib  3/13/24 TTE at 21+ wk LVEF 60-65%. No wall motion abnormality.   No significant valvular stenosis or regurgitation.   -appt tomorrow 3/19 to discuss results of Holter & Echo with cardiologist.      #GDM on insulin per MFM  -Dx 16 wk -> insulin by 20 wk   -Diabetes education done 2/12/24. Follow up visit 3/4/24   -3/6/24 Lantus 24 units HS per MFM  -A1c about once per trimester - order placed.   -growth US & NSTs as above  Delivery at 38-39  weeks advised for suboptimally medication controlled diabetes  Delivery at 39-39w6d for well controlled diabetes.    #Obesity (pre preg BMI 36)   -limit weight gain 11-20 lb   -L2US, growth US, NSTs per above    #H/o intractable migraines  -onset age 11.  20 or more migraines per month.   -only 4-5 hours of sleep on regular basis   -MRI C spine 2020 - Mild multilevel disc desiccation and bulging without significant loss of intervertebral disc space height. No significant spinal canal or neuroforaminal stenosis.   -MRI brain 2/12/21 - No acute intracranial abnormality.   -MRI c spine 8/21 - unremarkable. Done for left-sided facial and arm   numbness and radiculopathy.   -Past treatments: Botox, Nurtec, Aimovig, Ajovy, Amitriptyline, nortriptyline, propranolol, Depakote, Topamax, venlafaxine, Norco, Fioricet, multiple triptans, flexeril, and OTC analgesics. She has had facet and cervical epidurals as well as SPG block none of which provided much relief. Osteopathic manipulation therapy, PT, chiropractic, massage  acupuncture, magnesium, CoQ10, vitamin B complex, elimination diet.   -Botox helped partially in the past   -Neurologist Dr. Robe Gutierrez - currently taking Naratriptan 2.5 mg PRN  -Took Qulipta x 1 week & seemed to be working - stopped when found out she is pregnant.   -per patient, when she notified Dr. Gutierrez she is pregnant, he is no longer comfortable managing her migraine medication.   -Pain management 12/7/23 bilateral occipital neuralgia. Migraines originate from the occipital region, L>R. Bilateral occipital block, pending insurance approval and OBGyn approval.  Follow-up 2 to 3 weeks thereafter.   -1/10/24 - Bilateral greater occipital nerve block done. 100% improvement for 24 hr.   -1/24/24 - Sleep study ordered - not yet.    -2/2/24 Pain management f/u visit - consider repeat nerve blocks  -3/18 - 22w3d - migraines have improved significantly since around 19 wk (Note: started labetalol at 17 wk). Does not feel will need nerve block again at this time.     #CHEYANNE, insomnia  -meds: none currently. H/o zoloft, clonazepam, venlafaxine   -therapist - not currently. Not very interested. Resources given   -3/18 - 22w3d - Rx hydroxyzine PRN     #H/o PVCs  -Fhx Brugada syndrome - she tested negative   -3/31/2021 TTE - LVEF 55-60%. Unremarkable  -Holter monitor evaluation 4/2021 - was told her PVCs are minor (2%) and offered her medications if she wanted it - she declined - feeling fine.  -2/1/24 cardiologist Dr. Martinez. EKG - NSR, right axis deviation, no ischemia, borderline  -2/19/24 Holter monitor x 1 week - normal   -3/13/24 TTE at 21+ wk LVEF 60-65%. normal    #Subclinical hyperthyroidism  -Low TSH with normal FT4 & FT3 noted on initial prenatal labs & in 2nd trimester. No treatment needed     #Vitamin D deficiency  -1/6/24 Vit D 12.8 at 12 wk   -optimal dosing in pregnancy is controversial, but given starting out very low, Rx 50,000 international units weekly for 8 weeks, then switch to 4000 units  daily over the counter in addition to prenatal vitamin     #Bloating, constipation/difficulty evacuating stools, JAKI  -encouraged increased fiber & water  -possible pelvic floor dysfunction. PFPT ordered -  has appt 3/25     #Pap neg, +HPV 18/45 (11/27/23)  -2/7 - 16w5d - colposcopy - cervical biopsy polypoid endocervical glandular tissue - Dr. Rock   -recommend pap & HPV co-testing at 1 year.     RTC 4 wk

## 2024-03-19 ENCOUNTER — TELEPHONE (OUTPATIENT)
Facility: CLINIC | Age: 34
End: 2024-03-19

## 2024-03-19 NOTE — TELEPHONE ENCOUNTER
Spoke with pt. Aware to have an A1c drawn each trimester. To have drawn soon for second trimester. Verbalized understanding.

## 2024-03-20 ENCOUNTER — TELEPHONE (OUTPATIENT)
Facility: CLINIC | Age: 34
End: 2024-03-20

## 2024-03-20 NOTE — TELEPHONE ENCOUNTER
Received in office PA approval for [employer] Test strips    Approval Date: 2/01/2024- 12/20/2024    Case number: [id number]  Quantity- up to 200 for 30 days    Approval sent to scanning.

## 2024-03-20 NOTE — TELEPHONE ENCOUNTER
Awaiting for dexcom 7 PA form to be completed.     Prior authorization approved   Case ID: [id number]      Payer: [employer] [facility]    [phone]   The case has been Approved from  [id number] to [id number]   Approval Details    Authorized from February 1, 2024 to December 20, 2024      Electronic appeal: Not supported   View History     Medication Being Authorized     Glucose Blood (BLOOD GLUCOSE TEST) In Vitro Strip

## 2024-03-22 ENCOUNTER — MED REC SCAN ONLY (OUTPATIENT)
Facility: CLINIC | Age: 34
End: 2024-03-22

## 2024-03-25 ENCOUNTER — OFFICE VISIT (OUTPATIENT)
Dept: PHYSICAL THERAPY | Facility: HOSPITAL | Age: 34
End: 2024-03-25
Attending: OBSTETRICS & GYNECOLOGY
Payer: MEDICAID

## 2024-03-25 DIAGNOSIS — O99.891 STRESS INCONTINENCE DURING PREGNANCY (HCC): ICD-10-CM

## 2024-03-25 DIAGNOSIS — K59.00 CONSTIPATION DURING PREGNANCY IN SECOND TRIMESTER (HCC): Primary | ICD-10-CM

## 2024-03-25 DIAGNOSIS — O99.612 CONSTIPATION DURING PREGNANCY IN SECOND TRIMESTER (HCC): Primary | ICD-10-CM

## 2024-03-25 DIAGNOSIS — N39.3 STRESS INCONTINENCE DURING PREGNANCY (HCC): ICD-10-CM

## 2024-03-25 DIAGNOSIS — M62.89 PELVIC FLOOR DYSFUNCTION: ICD-10-CM

## 2024-03-25 PROCEDURE — 97162 PT EVAL MOD COMPLEX 30 MIN: CPT

## 2024-03-25 PROCEDURE — 97112 NEUROMUSCULAR REEDUCATION: CPT

## 2024-03-25 NOTE — PROGRESS NOTES
MUSCULOSKELETAL AND PELVIC FLOOR EVALUATION:     Diagnosis:   Constipation during pregnancy in second trimester ([facility]) (O99.612,K59.00)  Pelvic floor dysfunction (M62.89)  Stress incontinence during pregnancy ([facility]) (O99.891,N39.3)      Referring Provider: Jocelyne  Date of Evaluation:    3/25/2024    Precautions:   23 weeks pregnant as of 3/25 with FRANCIS of 24 , gestational diabetes Next MD visit:   none scheduled  Date of Surgery: n/a     PATIENT SUMMARY   Tammy Ramirez is a 33 year old female who presents to therapy today with complaints of JAKI and constipation. She is ~6 months pregnant and these symptoms have continued to slowly progress. JAKI occurs mostly in a seated position with legs crossed or when legs are extended. She will put a pad on when at home and sitting more, as it can add up to be a decent amount of leakage after sneezing or coughing a few times. She denies JAKI prior to pregnancy. Reports currently urinating 1x every 1-2 hours and 4x/night. She does often feel like urgency is significant but she does not end up urinating much when she goes. She has also been experiencing intermittent constipation issues. Reports defecating 1x/day on average, but she does have \"pellet-like\" stools and have to strain to go ~1x/week. She does take a fiber supplement and a stool softener, as needed. She denies pain with defecation, but she does feel very bloated and uncomfortable at times.  She does have round ligament pain intermittently when bending, reaching behind her or twisting. She does have some back pain that recently started and then travels into her R side. She also has some rib soreness.     Current symptoms include: back pain, constipation, and leakage    Pt describes pain level: current 0/10, best 0/10, worst 5-6/10.   Quality: aching    Pregnant Now: Yes, 23 weeks  Obstetrical/Gynecological history: : 1  Para: 0  Urodynamic Test: NA  Manometry: NA  Occupation/Activities: Not currently  working  PFDI-20: 104.16/300; Impairment= 34.7 %    Tammy describes prior level of function as having no issues prior to pregnancy. Pt goals include to resolve leakage and to improve constipation symptoms.   Past medical history was reviewed with Tammy. She  has a past medical history of Anxiety (2012), Arthritis, Bilateral occipital neuralgia (12/26/2023), Calculus of kidney, Cervical facet syndrome (02/25/2021), Cervical high risk HPV (human papillomavirus) test positive (11/27/2023), Chronic hypertension (01/2022), Chronic migraine without aura, with intractable migraine, so stated, with status migrainosus (02/25/2021), DDD (degenerative disc disease), cervical (02/25/2021), Dysmenorrhea (2019), Essential hypertension (2019), CHEYANNE (generalized anxiety disorder) (08/2021), Gestational diabetes requiring insulin ([facility]) (02/2024), History of echocardiogram (03/31/2021), History of echocardiogram (03/13/2024), Insomnia (08/2021), Meningitis (HCC), Menometrorrhagia (2018), Migraine (05/14/2021), Obesity (BMI 30-39.9) (2021), Palpitations (12/2020), Screening for genetic disease carrier status (01/09/2024), and Spinal headache. Chronic migraines. Gestational diabetes.       URINARY HABITS  Types of symptoms: stress incontinence and other: OAB  Events associated with the onset of urinary complaints: pregnancy  Abdominal/Vaginal Pressure complaints: yes  Urinary Frequency: 1x per hour  Urine Stream: WNL  Amount: diminished  Leaking occurs: with coughing, sneezing, laughing in sitting  Episodes of Leakage: 1+ times per day  Amount of leakage: min to moderate  Pad use: yes  Pad Change frequency: 1 per day  Nocturia: 4x  Fluid Intake: good  Bladder irritants: NA  Urine Stop test: NA  Post void dribble: No  Hovering: No  Empty bladder just in case: Yes  Do you ever leak urine without knowing it? No    BOWEL HABITS  Types of symptoms: Constipation   Frequency of bowel movements: 1x/day currently  Stool consistency: [place]  Stool Scale: 1-4  Do you strain with defecation: Yes, sometimes  Laxative use: No, stool softener and fiber capsules    SEXUAL HEALTH STATUS  WNL    ASSESSMENT  Tammy presents to physical therapy evaluation with primary c/o constipation and leakage. The results of the objective tests and measures show altered postural mechanics, decreased LE/TvA strength, suboptimal breathing mechanics.  Functional deficits include but are not limited to impaired QoL due to discomfort from leakage and weekly bouts of constipation.  Signs and symptoms are consistent with diagnosis of constipation and JAKI due to suspected PFM dysfunction. Pt and PT discussed evaluation findings, pathology, POC and HEP.  Pt voiced understanding and performs HEP correctly without reported pain. Skilled Pelvic Physical Therapy is medically necessary to address the above impairments and reach functional goals.    OBJECTIVE:   Posture: PPT, increased lordosis consistent with pregnancy-related changes  Pelvic Alignment: WNL  Deep Tendon Reflexes: NT  Gait: pt ambulates on level ground with decreased foot clearance KENY .     External Palpation: TTP in B round ligament  Scars (location/surgery): NA  Abdominal Wall Integrity: Impaired tone     Range Of Motion  Lumbar AROM screen: WNL  LE AROM screen: grossly WNL     Strength (MMT) 5/5 KENY LE except 3+/5 hip abduction, extension   Transverse Abdominis: 3/5    Flexibility Summary: WNL KENY LE     Breathing pattern: Chest dominant at rest but able to correct with cues     Informed consent for internal pelvic evaluation given: Not today      Today's Treatment and Response:   Patient education was provided on objective findings of external and internal evaluation and expectations with treatment outcomes. Educated on pelvic anatomy and function with diagrams and pelvic model, bladder normatives, adequate hydration levels, stress/urge urinary incontinence strategies, diaphragmatic breathing for PNS activation and  pelvic floor relaxation , and knack/pelvic muscle brace    Neuro: 26'  Extensive education provided on pelvic floor and pelvic organ anatomy and function. Discussed physiological changes that occur during pregnancy and post-partum period including compromised cough reflex, FABIANO, and constipation. Discussed likely causes of JAKI symptoms and how to mitigate leakage with use of KNACK contraction. Also discussed importance of PFM excursion for adequate shock absorption during ADL and rapid changes in IAP.   Access Code: [email or web address]  Date: 03/25/2024  - Pelvic Floor Lengthening in Hooklying  - 1 x daily - 7 x weekly - 10 reps  - Seated Diaphragmatic Breathing  - 1 x daily - 7 x weekly - 10 reps  - Sidelying Diaphragmatic Breathing  - 1 x daily - 7 x weekly - 10 reps      Charges: PT Eval Moderate Complexity, neuro x 2      Total Timed Treatment: 26 min     Total Treatment Time: 45 min     Based on clinical rationale and outcome measures, this evaluation involved Moderate Complexity decision making due to 1-2 personal factors/comorbidities, 3 body structures involved/activity limitations, and evolving symptoms including stress urinary incontinence and constipation  PLAN OF CARE:    Goals: (to be met in 10 visits)  Patient will demonstrate optimal PFM elongation with coordinated breathing x 10 reps to alleviate PF muscle tension.  Patient will improve PERF score to at least 3/6/6//6 to mitigate stress incontinence symptoms and optimize pelvic organ support.   Patient will report consistent grade 4 on BSS for improved bowel motility.   Patient will report consumption of at least 25 grams of daily fiber for improved stool consistency.  Patient will report optimal voiding mechanics and breath regulation with bowel movements for improved bowel emptying without straining.   Patient will report adherence to HEP for continued exercise benefits following cessation of PT.        Frequency / Duration: Patient will be seen for 1-2 x/week  or a total of 10 visits over a 90 day period.  Treatment will include: Manual Therapy, Neuromuscular Re-education, Self-Care Home Management, Therapeutic Activities, Therapeutic Exercise, and Home Exercise Program instruction     Education or treatment limitation: None  Rehab Potential:good      Patient/Family/Caregiver was advised of these findings, precautions, and treatment options and has agreed to actively participate in planning and for this course of care.    Thank you for your referral. Please co-sign or sign and return this letter via fax as soon as possible to [phone]. If you have any questions, please contact me at Dept: [phone]    Sincerely,  Electronically signed by therapist: Trish Sky PT  Physician's certification required: Yes  I certify the need for these services furnished under this plan of treatment and while under my care.    X___________________________________________________ Date____________________    Certification From: 3/25/2024  To:6/23/2024

## 2024-03-27 ENCOUNTER — TELEPHONE (OUTPATIENT)
Facility: CLINIC | Age: 34
End: 2024-03-27

## 2024-03-27 ENCOUNTER — OFFICE VISIT (OUTPATIENT)
Dept: NEUROLOGY | Facility: CLINIC | Age: 34
End: 2024-03-27
Payer: MEDICAID

## 2024-03-27 VITALS
BODY MASS INDEX: 40 KG/M2 | HEART RATE: 97 BPM | DIASTOLIC BLOOD PRESSURE: 76 MMHG | WEIGHT: 230.38 LBS | SYSTOLIC BLOOD PRESSURE: 118 MMHG | RESPIRATION RATE: 16 BRPM

## 2024-03-27 DIAGNOSIS — R51.9 HEADACHE IN PREGNANCY, SECOND TRIMESTER (HCC): ICD-10-CM

## 2024-03-27 DIAGNOSIS — G43.711 CHRONIC MIGRAINE WITHOUT AURA, WITH INTRACTABLE MIGRAINE, SO STATED, WITH STATUS MIGRAINOSUS: Primary | ICD-10-CM

## 2024-03-27 DIAGNOSIS — O26.892 HEADACHE IN PREGNANCY, SECOND TRIMESTER (HCC): ICD-10-CM

## 2024-03-27 PROCEDURE — 99214 OFFICE O/P EST MOD 30 MIN: CPT | Performed by: STUDENT IN AN ORGANIZED HEALTH CARE EDUCATION/TRAINING PROGRAM

## 2024-03-27 RX ORDER — ASPIRIN 81 MG/1
81 TABLET ORAL DAILY
COMMUNITY

## 2024-03-27 NOTE — PATIENT INSTRUCTIONS
Plan:  -Follow up with pain clinic regarding nerve block  -Per MFM, reasonable to use naratriptan sparingly for migraine abortive (do not take if hypertensive)    Refill policies:    Allow 2-3 business days for refills; controlled substances may take longer.  Contact your pharmacy at least 5 days prior to running out of medication and have them send an electronic request or submit request through the “request refill” option in your [employer] account.  Refills are not addressed on weekends; covering physicians do not authorize routine medications on weekends.  No narcotics or controlled substances are refilled after noon on Fridays or by on call physicians.  By law, narcotics must be electronically prescribed.  A 30 day supply with no refills is the maximum allowed.  If your prescription is due for a refill, you may be due for a follow up appointment.  To best provide you care, patients receiving routine medications need to be seen at least once a year.  Patients receiving narcotic/controlled substance medications need to be seen at least once every 3 months.  In the event that your preferred pharmacy does not have the requested medication in stock (e.g. Backordered), it is your responsibility to find another pharmacy that has the requested medication available.  We will gladly send a new prescription to that pharmacy at your request.    Scheduling Tests:    If your physician has ordered radiology tests such as MRI or CT scans, please contact Central Scheduling at [phone] right away to schedule the test.  Once scheduled, the [facility] Centralized Referral Team will work with your insurance carrier to obtain pre-certification or prior authorization.  Depending on your insurance carrier, approval may take 3-10 days.  It is highly recommended patients assure they have received an authorization before having a test performed.  If test is done without insurance authorization, patient may be responsible for the entire amount  billed.      Precertification and Prior Authorizations:  If your physician has recommended that you have a procedure or additional testing performed the [facility] Centralized Referral Team will contact your insurance carrier to obtain pre-certification or prior authorization.    You are strongly encouraged to contact your insurance carrier to verify that your procedure/test has been approved and is a COVERED benefit.  Although the [facility] Centralized Referral Team does its due diligence, the insurance carrier gives the disclaimer that \"Although the procedure is authorized, this does not guarantee payment.\"    Ultimately the patient is responsible for payment.   Thank you for your understanding in this matter.  Paperwork Completion:  If you require FMLA or disability paperwork for your recovery, please make sure to either drop it off or have it faxed to our office at [phone]. Be sure the form has your name and date of birth on it.  The form will be faxed to our Forms Department and they will complete it for you.  There is a 25$ fee for all forms that need to be filled out.  Please be aware there is a 10-14 day turnaround time.  You will need to sign a release of information (YOAN) form if your paperwork does not come with one.  You may call the Forms Department with any questions at [phone].  Their fax number is [phone].

## 2024-03-27 NOTE — TELEPHONE ENCOUNTER
Breast Pump order was received from [employer].  Order form was placed in Dr. Herminia Casanova's bin for signature.

## 2024-03-27 NOTE — H&P
Neurology New Office Visit    Tammy Ramirez   Date of Birth 1990    Subjective:  Tammy Ramirez is a(n) 33 year old female with a medical history of chronic hypertension (related to severe migraine per [facility]), gestational diabetes, obesity, migraines, CHEYANNE, history of PVCs, subclinical hyperthyroidism,  (~24 w) currently pregnant (FRANCIS 24) who presents to establish care for migraines.     Per patient, has had migraines since 11 years old, have gotten progressively worse over the years. Worse in 20s. By late 20s having 20 days out of month. Ongoing throughout pregnancy until maybe three weeks ago. Reduced drastically. Has tried many different medications, diet, exercise, supplement, facet joint, spinal epidural, botox, occipital nerve block (worked for ~24 hours). Starts in occipital region, wraps around eye either side, usually one hemisphere at a time. Can change from one side to the other. No other associated neurologic symptoms. Semiology stable since childhood. Have increased in frequency and seveirty for last few years, meds not working as well as used to.     Now having migraines a few times a week rather than daily. Medications were kind of hit or miss (naratriptan- oked by [facility], fioricet). Currently having between 10-15 per month rather than 20+ per month.       Per Dr. Rosado's note 2024, [facility],  \"I suggested that the patient continue to follow-up with pain management.  She can continue use of the naratriptan since she is outside the first trimester.\"    Brief headache history:   -Onset: ~age 11  -Current frequency 10-15 per month, previously >20  -Triggers: worse around menstrual cycle  -Associated sxs: photophobia, phonophobia, nausea, vomiting. No visual aura.   -Location: bsae of skull and radiate over to eye, either side  FMH: father has migraines  -Current medications:   -Abortive: naratriptan 2.5 mg PRN ([facility] per Dr. Rosado [facility] note 2024 \"She can continue use of the naratriptan since  she is outside the first trimester \")   1/10/24 - Bilateral greater occipital nerve block done. 100% improvement for 24 hr.    -Preventive:   -Prior  medications and palliative measures:    Botox, Nurtec, Aimovig, Ajovy, Amitriptyline, nortriptyline, propranolol, Depakote, Topamax, venlafaxine, Norco, Fioricet, multiple triptans, flexeril, and OTC analgesics. She has had facet and cervical epidurals as well as SPG block none of which provided much relief. Osteopathic manipulation therapy, PT, chiropractic, massage acupuncture, magnesium, CoQ10, vitamin B complex, elimination diet. Quilipta- helping stopped when pregnant        Problem List:  Patient Active Problem List   Diagnosis    Cervical facet syndrome    Chronic migraine without aura, with intractable migraine, so stated, with status migrainosus    DDD (degenerative disc disease), cervical    Migraine    Headache in pregnancy, second trimester ([facility])    Obesity affecting pregnancy in second trimester ([facility])    Anxiety disorder affecting pregnancy, antepartum ([facility])    Chronic hypertension affecting pregnancy ([facility])    Bilateral occipital neuralgia    Nausea and vomiting of pregnancy, antepartum ([facility])    Influenza vaccination declined by patient    Cervical high risk HPV (human papillomavirus) test positive    Abnormal cervical Papanicolaou smear affecting pregnancy in second trimester ([facility])    Vitamin D deficiency    Low TSH level    Abnormal EKG    Pelvic floor dysfunction    Constipation during pregnancy in second trimester ([facility])    Insulin controlled gestational diabetes mellitus (GDM) in second trimester ([facility])    Subclinical hyperthyroidism    Stress incontinence during pregnancy ([facility])    Heartburn during pregnancy in second trimester ([facility])    Insomnia       PMHx:  Past Medical History:   Diagnosis Date    Anxiety 2012    Arthritis     Not sure of the date but i have arthritus in my neck    Bilateral occipital neuralgia 12/26/2023    Calculus of kidney      About a year and a half ago    Cervical facet syndrome 02/25/2021    Cervical high risk HPV (human papillomavirus) test positive 11/27/2023    Pap neg, +HPV 18/45 in 1st trimester.    Chronic hypertension 01/2022    Since at least 1/2022 per chart review, though patient denies this diagnosis.    Chronic migraine without aura, with intractable migraine, so stated, with status migrainosus 02/25/2021    chronic since age 11    DDD (degenerative disc disease), cervical 02/25/2021    MRI C spine 2020 - Mild multilevel disc desiccation and bulging without significant loss of intervertebral disc space height. No significant spinal canal or neuroforaminal stenosis.    Dysmenorrhea 2019    Essential hypertension 2019    CHEYANNE (generalized anxiety disorder) 08/2021    per [facility]    Gestational diabetes requiring insulin (HCC) 02/2024    Dx 16 wk with GDM. On insulin by 20 wk    History of echocardiogram 03/31/2021    3/31/2021 TTE - LVEF 55-60%. Unremarkable. Done for h/o palpitations, PVCs    History of echocardiogram 03/13/2024    3/13/24 TTE at 21+ wk LVEF 60-65%. No wall motion abnormality.  No significant valvular stenosis or regurgitation.    Insomnia 08/2021    Meningitis (HCC)     mid 20s. They thought maybe mumps?    Menometrorrhagia 2018    Migraine 05/14/2021    Obesity (BMI 30-39.9) 2021    Palpitations 12/2020    Holter monitor 2021 minor PVCs. 3/2021 Echo LVEF 55-60%, unremarkable.    Screening for genetic disease carrier status 01/09/2024    [employer] carrier screen NEGATIVE    Spinal headache     H/o spinal puncture headaches after epidurals       PSHx:  Past Surgical History:   Procedure Laterality Date    COLPOSCOPY, CERVIX W/UPPER ADJACENT VAGINA; W/BIOPSY(S), CERVIX  02/07/2024    16w5d - cervical biopsy polypoid endocervical glandular tissue - Dr. Rock     INJECT ANESTHETIC AGENT GREATER OCCIPITAL NERVE  01/10/2024    Bilateral greater occipital nerve block - Depo-medrol & 1% lidocaine -   [place]. Done at approx 12 wk gestation    LUMBAR PUNCTURE/SPINAL TAP(BEDSIDE)      mid 20s. Dx meningitis    OTHER SURGICAL HISTORY  07/2021 7/2021: cervical medial branch block C2-C5, seen in ED after procedure for dizziness, left sided facial numbness, worsening headache.    TONSILLECTOMY         SocHx:  Social History     Socioeconomic History    Marital status: Single   Tobacco Use    Smoking status: Never    Smokeless tobacco: Never   Vaping Use    Vaping Use: Never used   Substance and Sexual Activity    Alcohol use: Never    Drug use: Not Currently     Types: Cannabis     Comment: Recreaction in the past. NOT current.   Other Topics Concern    Caffeine Concern Yes     Comment: soda occ    Exercise No     Social Determinants of Health     Financial Resource Strain: Unknown (3/1/2024)    Financial Resource Strain     Difficulty of Paying Living Expenses: Patient declined     Med Affordability: No   Food Insecurity: Unknown (3/1/2024)    Food Insecurity     Food Insecurity: Patient declined   Transportation Needs: Unknown (3/1/2024)    Transportation Needs     Lack of Transportation: Patient declined   Stress: No Stress Concern Present (3/1/2024)    Stress     Feeling of Stress : No   Housing Stability: Low Risk  (3/1/2024)    Housing Stability     Housing Instability: No       Family History:  Family History   Problem Relation Age of Onset    Other (Brugada syndrome) Father         defibrillator    Heart Disorder Father         Atrial flutter    Migraines Father         very difficult to treat    Other (Vasculitis) Father     Genetic Disease Father         Brugada Disease    Dementia Paternal Grandfather     Asthma Paternal Grandmother        Current Medications:  Current Outpatient Medications   Medication Sig Dispense Refill    aspirin 81 MG Oral Tab EC Take 1 tablet (81 mg total) by mouth daily.      insulin glargine (LANTUS SOLOSTAR) 100 UNIT/ML Subcutaneous Solution Pen-injector Inject 30 Units into the  skin at bedtime. Use a new needle with each injection 15 mL 2    Cholecalciferol (VITAMIN D) 50 MCG (2000 UT) Oral Cap Take by mouth 2 (two) times a day.      hydrOXYzine 25 MG Oral Tab Take 1-2 tablets (25-50 mg total) by mouth 3 (three) times daily as needed for Anxiety (for sleep trouble). 30 tablet 11    labetalol 100 MG Oral Tab Take 1 tablet (100 mg total) by mouth 2 (two) times daily. 60 tablet 0    Continuous Blood Gluc Sensor (DEXCOM G7 SENSOR) Does not apply Misc 1 each Every 10 days. 3 each 11    Blood Glucose Monitoring Suppl (ONETOUCH VERIO FLEX SYSTEM) w/Device Does not apply Kit 1 each As Directed. 1 kit 0    OneTouch Delica Lancets 30G Does not apply Misc 1 each 6 (six) times daily. 200 each 3    Insulin Pen Needle 31G X 5 MM Does not apply Misc Use a new needle with each injection 100 each 2    Magnesium 200 MG Oral Tab Take 2 tablets (400 mg total) by mouth daily.      pyridoxine 50 MG Oral Tab Take 1 tablet (50 mg total) by mouth daily.      OneTouch Delica Lancets 30G Does not apply Misc 1 each 4 (four) times daily. 200 each 3    Lancets Does not apply Misc Use to test BG 4x/day. 400 each 2    Glucose Blood (BLOOD GLUCOSE TEST) In Vitro Strip Use to test BG 4x/day 400 strip 2    Blood Glucose Monitoring Suppl Does not apply Kit Use to test BG. 1 kit 0    Naratriptan HCl 2.5 MG Oral Tab Take 1 tablet (2.5 mg total) by mouth as needed for Migraine. 21 tablet 3    butalbital-acetaminophen-caffeine -40 MG Oral Tab Take 1 tablet by mouth every 6 (six) hours as needed for Pain. 30 tablet 1    famotidine 20 MG Oral Tab Take 1 tablet (20 mg total) by mouth 2 (two) times daily. 60 tablet 5    Prenatal MV-Min-Fe Fum-FA-DHA (PRENATAL 1 OR) Take by mouth.       Objective/Physical Exam:    Vital Signs:  Blood pressure 118/76, pulse 97, resp. rate 16, weight 230 lb 6.4 oz (104.5 kg), last menstrual period 09/22/2023.  General: Alert, good recall of personal medical history, pregnant,  pleasant    Respiratory: Non labored breathing on room air    Cardiovascular: Regular rate    Mental status: Alert, oriented, language fluent, comprehension intact    Cranial nerves: Optic disc margins sharp VF full to confrontation bilaterally. Pupils equal, round, equally reactive to light and accommodation. Extraocular eye movements intact without nystagmus. Face sensation intact to light touch. Face strength symmetric and intact. No dysarthria.    Motor:   Power:   -Right upper extremity: shoulder abductors 5  -Left upper extremity: shoulder abductors 5  -Right lower extremity: hip flexion 5  -Left lower extremity: hip flexion 5  Tone: Normal   Bulk: Normal  Abnormal movements: None    Sensory: Intact to light touch in distal extremities.    Coordination: Finger to nose and heel to shin intact.    Reflexes: Right/Left: Biceps 2/2. Patella 2/2.    Gait: Narrow based, symmetric arm swing, no gait assist    Labs:     Component      Latest Ref Rng 2/12/2024   Glucose      70 - 99 mg/dL 113 (H)    Sodium      136 - 145 mmol/L 139    Potassium      3.5 - 5.1 mmol/L 3.9    Chloride      98 - 112 mmol/L 108    Carbon Dioxide, Total      21.0 - 32.0 mmol/L 25.0    ANION GAP      0 - 18 mmol/L 6    BUN      9 - 23 mg/dL 6 (L)    CREATININE      0.55 - 1.02 mg/dL 0.72    CALCIUM      8.5 - 10.1 mg/dL 9.5    CALCULATED OSMOLALITY      275 - 295 mOsm/kg 286    EGFR      >=60 mL/min/1.73m2 114    AST (SGOT)      15 - 37 U/L 7 (L)    ALT (SGPT)      13 - 56 U/L 15    ALKALINE PHOSPHATASE      37 - 98 U/L 72    Total Bilirubin      0.1 - 2.0 mg/dL 0.2    PROTEIN, TOTAL      6.4 - 8.2 g/dL 6.8    Albumin      3.4 - 5.0 g/dL 2.9 (L)    Globulin      2.8 - 4.4 g/dL 3.9    A/G Ratio      1.0 - 2.0  0.7 (L)    Patient Fasting for CMP? No    TSH      0.358 - 3.740 mIU/mL 0.341 (L)       Legend:  (H) High  (L) Low    Imaging:  MR brain 2/2021 wwo per report  Findings:     No hemorrhage, hydrocephalus, midline shift.     No restricted  diffusion, blooming, abnormal intra or extra-axial enhancement.     No significant volume loss, abnormal intra or extra-axial signal.     Major flow voids intact.     Visualized sinuses are clear.       IMPRESSION:     No acute intracranial abnormality.     Assessment:  Tammy Ramirez is a(n) 33 year old female with a medical history of chronic hypertension (related to severe migraine per [facility]), gestational diabetes, obesity, migraines, CHEYANNE, history of PVCs, subclinical hyperthyroidism,  (~24 w) currently pregnant (FRANCIS 24) who presents to establish care for migraines without aura. Migraines improved in frequency since ~19 weeks gestation (has been on labetalol for BP since ~17 weeks). Migraine character remains at baseline. Has used naratriptan as abortive very infrequently;Per Dr. Rosado's note 2024, [facility],  \"I suggested that the patient continue to follow-up with pain management.  She can continue use of the naratriptan since she is outside the first trimester.\" Neurologic exam unremarkable.     Discussed the following limited data on naratriptan in pregnany \"Pregnancy outcome data for naratriptan is available from a pregnancy registry sponsored by [employer]. As of 2012, data were available for 57 infants/fetuses exposed to naratriptan (52 during the first trimester), and seven exposed to both naratriptan and sumatriptan. Following naratriptan exposure, there was one infant born with a birth defect; this infant was also exposed to sumatriptan during the first trimester of pregnancy. The pregnancy registry was closed to enrollment in 2012 (Brant ). Additional data related to the use of naratriptan in pregnancy is limited in comparison to other 5-HT1B/1D agonists (triptans) (Artemio 2011; Ann-Marie 2010; Ann-Marie 2012; Ann-Marie 2013; Rolando 2018; Brody 2018). Triptans should be avoided in pregnant patients with cardiac disease or hypertension  (ACOG 2022; [facility] [Zoë 2013]).\" Discussed difficult balance of managing her migraines with limited evidence in triptan use in pregnancy; fortunately her migraine control is improved now.     Plan:  -Follow up with pain clinic regarding nerve block (per pain management, MFM advised follow up)  -Per MFM, reasonable to use naratriptan sparingly for migraine abortive (do not take if hypertensive)    1. Chronic migraine without aura, with intractable migraine, so stated, with status migrainosus    2. Headache in pregnancy, second trimester (HCC)    Total time 39 minutes including chart review, eliciting history, physical exam, and counseling.    Noelle Block, DO

## 2024-04-02 ENCOUNTER — TELEPHONE (OUTPATIENT)
Dept: PERINATAL CARE | Facility: HOSPITAL | Age: 34
End: 2024-04-02

## 2024-04-02 RX ORDER — INSULIN GLARGINE 100 [IU]/ML
34 INJECTION, SOLUTION SUBCUTANEOUS NIGHTLY
Qty: 15 ML | Refills: 2 | Status: SHIPPED | OUTPATIENT
Start: 2024-04-02

## 2024-04-02 NOTE — TELEPHONE ENCOUNTER
24w4d  Received BS log for date range 3/24-4/1     Low  High Out of Range   Fasting Blood Sugar 84 110 4/8   Post Breakfast 75 128 2/9   Post Lunch 90 114 0/8   Post Dinner 90 134 1/8     Patient is currently taking:    Lantus 30 units HS     To Dr. Rosado for review.

## 2024-04-03 ENCOUNTER — APPOINTMENT (OUTPATIENT)
Dept: PHYSICAL THERAPY | Facility: HOSPITAL | Age: 34
End: 2024-04-03
Attending: OBSTETRICS & GYNECOLOGY
Payer: MEDICAID

## 2024-04-09 ENCOUNTER — TELEPHONE (OUTPATIENT)
Dept: PERINATAL CARE | Facility: HOSPITAL | Age: 34
End: 2024-04-09

## 2024-04-09 NOTE — TELEPHONE ENCOUNTER
25w4d  Received BS log for date range 4/3-4/8     Low  High Out of Range   Fasting Blood Sugar 96 132 6/6   Post Breakfast 102 138 1/6   Post Lunch 83 115 0/4   Post Dinner 103 119 0/3     Patient is currently taking Lantus 34 units at bedtime.   To Dr. Rosado for review.

## 2024-04-10 ENCOUNTER — TELEPHONE (OUTPATIENT)
Dept: PERINATAL CARE | Facility: HOSPITAL | Age: 34
End: 2024-04-10

## 2024-04-10 ENCOUNTER — APPOINTMENT (OUTPATIENT)
Dept: PHYSICAL THERAPY | Facility: HOSPITAL | Age: 34
End: 2024-04-10
Attending: OBSTETRICS & GYNECOLOGY
Payer: MEDICAID

## 2024-04-13 ENCOUNTER — LAB ENCOUNTER (OUTPATIENT)
Dept: LAB | Facility: HOSPITAL | Age: 34
End: 2024-04-13
Attending: OBSTETRICS & GYNECOLOGY
Payer: MEDICAID

## 2024-04-13 DIAGNOSIS — O24.414 INSULIN CONTROLLED GESTATIONAL DIABETES MELLITUS (GDM) IN SECOND TRIMESTER (HCC): ICD-10-CM

## 2024-04-13 LAB
EST. AVERAGE GLUCOSE BLD GHB EST-MCNC: 105 MG/DL (ref 68–126)
HBA1C MFR BLD: 5.3 % (ref ?–5.7)

## 2024-04-13 PROCEDURE — 83036 HEMOGLOBIN GLYCOSYLATED A1C: CPT

## 2024-04-13 PROCEDURE — 36415 COLL VENOUS BLD VENIPUNCTURE: CPT

## 2024-04-16 ENCOUNTER — TELEPHONE (OUTPATIENT)
Dept: PERINATAL CARE | Facility: HOSPITAL | Age: 34
End: 2024-04-16

## 2024-04-16 RX ORDER — LABETALOL 100 MG/1
100 TABLET, FILM COATED ORAL 2 TIMES DAILY
Qty: 60 TABLET | Refills: 0 | Status: SHIPPED | OUTPATIENT
Start: 2024-04-16

## 2024-04-16 RX ORDER — INSULIN GLARGINE 100 [IU]/ML
52 INJECTION, SOLUTION SUBCUTANEOUS NIGHTLY
COMMUNITY
Start: 2024-04-16

## 2024-04-16 NOTE — TELEPHONE ENCOUNTER
26w4d  Received BS log for date range 4/10-4/16     Low  High Out of Range   Fasting Blood Sugar 91 185 * 5/7   Post Breakfast 97 123 1/7   Post Lunch 83 127 1/6   Post Dinner 97 111 0/5     *other high fastings; 97 x 2, 107, 125    Patient is currently taking:    Lantus 44 units HS     To Dr. Marroquin for review.

## 2024-04-17 ENCOUNTER — OFFICE VISIT (OUTPATIENT)
Dept: PHYSICAL THERAPY | Facility: HOSPITAL | Age: 34
End: 2024-04-17
Attending: OBSTETRICS & GYNECOLOGY
Payer: MEDICAID

## 2024-04-17 PROCEDURE — 97110 THERAPEUTIC EXERCISES: CPT

## 2024-04-17 PROCEDURE — 97112 NEUROMUSCULAR REEDUCATION: CPT

## 2024-04-17 NOTE — PROGRESS NOTES
Diagnosis:   Constipation during pregnancy in second trimester ([facility]) (O99.612,K59.00)  Pelvic floor dysfunction (M62.89)  Stress incontinence during pregnancy ([facility]) (O99.891,N39.3)        Referring Provider: Herminia Casanova  Date of Evaluation:    3/25/24    Precautions:   26 weeks pregnant as of 4/17 with FRANCIS of 7/19, gestational diabetes Next MD visit:   none scheduled  Date of Surgery: n/a   Insurance Primary/Secondary: [employer] MEDICAID / N/A     # Auth Visits: 11            Subjective: Reports that she has been doing the breathing exercises and they seem to be helping with urination and defecation. She only had one instance of JAKI the past few weeks, as well. Feels back pain and sciatic symptoms intermittently down the R side. Feels that if she stands or walks for 10-15+ mins, she notices more pain on the R. She also has had some cramping and pain in the lower abdomen.     Pain: 4-5/10      Objective:   Posture: PPT, increased lordosis consistent with pregnancy-related changes  Pelvic Alignment: WNL  Deep Tendon Reflexes: NT  Gait: pt ambulates on level ground with decreased foot clearance KENY .      External Palpation: TTP in B round ligament  Scars (location/surgery): NA  Abdominal Wall Integrity: Impaired tone      Range Of Motion  Lumbar AROM screen: WNL  LE AROM screen: grossly WNL      Strength (MMT) 5/5 KENY LE except 3+/5 hip abduction, extension   Transverse Abdominis: 3/5     Flexibility Summary: WNL KENY LE      Breathing pattern: Chest dominant at rest but able to correct with cues       Assessment: Progressed mobility and core stabilization exercise today on mat, as well as seated on SB, which pt tolerated well. Updated HEP and discussed ideal frequency of performance to optimize mobility and mitigate pain for remainder of pregnancy.       Goals: (to be met in 10 visits)  Patient will demonstrate optimal PFM elongation with coordinated breathing x 10 reps to alleviate PF muscle tension.  Patient  will improve PERF score to at least 3/6/6//6 to mitigate stress incontinence symptoms and optimize pelvic organ support.   Patient will report consistent grade 4 on BSS for improved bowel motility.   Patient will report consumption of at least 25 grams of daily fiber for improved stool consistency.  Patient will report optimal voiding mechanics and breath regulation with bowel movements for improved bowel emptying without straining.   Patient will report adherence to HEP for continued exercise benefits following cessation of PT.    Plan: Continue with core stabilization, gentle lumbopelvic mobility, progressive education on bowel/bladder health.  Date: 4/17/2024  TX#: 2/11 Date:                 TX#: 3/11 Date:                 TX#: 4/ Date:                 TX#: 5/ Date:   Tx#: 6/   There-ex: 26'  Supine SB rotations x 20 B    Supine piriformis rocking x 10 B    SL open books x 10 B    Supine DKTC x 20    Seated hip adductor stretch on SB x 1 min B    HEP       Neuro: 16'  Subjective/review of current symptoms     SL PFM/TvA co-contraction with breathing x  12    Seated PFM/TvA co-contraction x 12    Seated SB GTB diagonal pulls x 10    Seated SB GTB horizontal abduction pulls x 10                     HEP: Access Code: [email or web address]  Date: 03/25/2024  - Pelvic Floor Lengthening in [employer]  - 1 x daily - 7 x weekly - 10 reps  - Seated Diaphragmatic Breathing  - 1 x daily - 7 x weekly - 10 reps  - Sidelying Diaphragmatic Breathing  - 1 x daily - 7 x weekly - 10 reps    Access Code: [email or web address]  Date: 04/17/2024  Prepared by: Trish Sky  - Supine Figure 4 Piriformis Stretch  - 1 x daily - 7 x weekly - 10 reps  - Supine Lower Trunk Rotation with Swiss Ball  - 1 x daily - 7 x weekly - 2 sets - 10 reps  - Supine Hip and Knee Flexion AROM with Swiss Ball  - 1 x daily - 7 x weekly - 2 sets - 10 reps  - Seated Hip Adductor Stretch on Swiss Ball  - 1 x daily - 7 x weekly - 1 min hold  - Sidelying Transversus Abdominis Bracing  -  1 x daily - 7 x weekly - 10 reps  - Seated Transversus Abdominis Bracing  - 1 x daily - 7 x weekly - 10 reps  - Seated Shoulder Diagonal Pulls with Resistance  - 1 x daily - 7 x weekly - 10 reps  - Seated Shoulder Horizontal Abduction with Resistance  - 1 x daily - 7 x weekly - 10 reps    Charges: neuro x 1, ther-ex x 2       Total Timed Treatment: 42 min  Total Treatment Time: 42 min

## 2024-04-20 ENCOUNTER — ROUTINE PRENATAL (OUTPATIENT)
Facility: CLINIC | Age: 34
End: 2024-04-20
Payer: MEDICAID

## 2024-04-20 VITALS
BODY MASS INDEX: 38.82 KG/M2 | HEIGHT: 64 IN | DIASTOLIC BLOOD PRESSURE: 80 MMHG | SYSTOLIC BLOOD PRESSURE: 122 MMHG | WEIGHT: 227.38 LBS | HEART RATE: 96 BPM

## 2024-04-20 DIAGNOSIS — Z36.9 ANTENATAL SCREENING ENCOUNTER (HCC): Primary | ICD-10-CM

## 2024-04-20 PROCEDURE — 99214 OFFICE O/P EST MOD 30 MIN: CPT

## 2024-04-20 NOTE — PROGRESS NOTES
KYLEIGH 27w1d    She is having right sided rib pain - reassured patient.   -CBC, 3rd tri HIV and T Pallidum discussed - ordered  -TDAP discussed - info given - ask at next visit       FRANCIS 7/19/24 by 6w3d US not c/w LMP   O+     GIRL  -NIPS neg, NT normal.    -carrier neg   -AFP low risk    -Flu 23-24 - declines   -2023 COVID-19 booster encouraged   -classes, pre-registration, pediatrician (Dr. Long), breast pump, car seat discussed   -H/o spinal headaches after epidural      Chronic Hypertension - started on labetalol at 17 wk   -Never formally diagnosed with CHTN, but multiple high readings prior to pregnancy (per EMR since at least 2018). See previous OB notes. Hypertensive even at 6 wk  -aspirin recommended - taking 81 mg tab x 2 daily   -Baseline EKG - 1/21 - ABNORMAL. Cannot rule out anterior infarct   -Baseline CMP & urine P/C normal. TSH 0.235 (L) - 12/24  -1/24/24 - /90 - 14w5d (NO CURRENT HA.) Patient bp at home 120's-130's/70-80's  -Recommend surveillance as if chronic HTN - reviewed potential for placental damage, fetal growth restriction, stillbirth. M Dr. Rosado in agreement    -BP monitoring at home   -2/7/24 /98 - 16w5d - Rx labetalol 100 mg BID   -L2 US normal   -Growth US q 4 wk in 3rd trim (with BPP at or beyond 32 wk) - ordered   Appt  4/26                         -Weekly NSTs at 32 weeks; twice weekly NST's at 34 wk     #ABNORMAL EKG - 1/21/24  -1/21/24 - EKG report reads cannot rule out anterior infarct. Appears changed from 1/13/23 EKG.   -Cardiology 2/1/24 Dr. Martinez.                Dr. Martinez says 1/21/24 EKG was just low voltage in lead V3, no infarct  2/1/24 EKG - NSR, right axis deviation, no ischemia, borderline  2/19/24 Holter monitor x 1 week - NSR (HR  bpm, mean 92). No Afib  3/13/24 TTE at 21+ wk LVEF 60-65%. No wall motion abnormality.   No significant valvular stenosis or regurgitation.   -appt  3/19 to discuss results of Holter & Echo with cardiologist.        GDM on insulin per MFM  -Dx 16 wk -> insulin by 20 wk   -Diabetes education done 2/12/24. Follow up visit 3/4/24   -3/6/24 Lantus 24 units HS per MFM  -A1c about once per trimester - order placed.   -growth US & NSTs as above  Delivery at 38-39  weeks advised for suboptimally medication controlled diabetes  Delivery at 39-39w6d for well controlled diabetes.     Obesity (pre preg BMI 36)   -limit weight gain 11-20 lb   -L2US, growth US, NSTs per above     H/o intractable migraines  -onset age 11.  20 or more migraines per month.   -only 4-5 hours of sleep on regular basis   -MRI C spine 2020 - Mild multilevel disc desiccation and bulging without significant loss of intervertebral disc space height. No significant spinal canal or neuroforaminal stenosis.   -MRI brain 2/12/21 - No acute intracranial abnormality.   -MRI c spine 8/21 - unremarkable. Done for left-sided facial and arm   numbness and radiculopathy.   -Past treatments: Botox, Nurtec, Aimovig, Ajovy, Amitriptyline, nortriptyline, propranolol, Depakote, Topamax, venlafaxine, Norco, Fioricet, multiple triptans, flexeril, and OTC analgesics. She has had facet and cervical epidurals as well as SPG block none of which provided much relief. Osteopathic manipulation therapy, PT, chiropractic, massage acupuncture, magnesium, CoQ10, vitamin B complex, elimination diet.   -Botox helped partially in the past   -Neurologist Dr. Robe Gutierrez - currently taking Naratriptan 2.5 mg PRN  -Took Qulipta x 1 week & seemed to be working - stopped when found out she is pregnant.   -per patient, when she notified Dr. Gutierrez she is pregnant, he is no longer comfortable managing her migraine medication.   -Pain management 12/7/23 bilateral occipital neuralgia. Migraines originate from the occipital region, L>R. Bilateral occipital block, pending insurance approval and OBGyn approval.  Follow-up 2 to 3 weeks thereafter.   -1/10/24 - Bilateral greater occipital nerve block  done. 100% improvement for 24 hr.   -1/24/24 - Sleep study ordered - not yet.    -2/2/24 Pain management f/u visit - consider repeat nerve blocks  -3/18 - 22w3d - migraines have improved significantly since around 19 wk (Note: started labetalol at 17 wk). Does not feel will need nerve block again at this time.   -her migraines have improved, takes Naratriptan only when needed      CHEYANNE, insomnia  -meds: none currently. H/o zoloft, clonazepam, venlafaxine   -therapist - not currently. Not very interested. Resources given   -3/18 - 22w3d - Rx hydroxyzine PRN      H/o PVCs  -Fhx Brugada syndrome - she tested negative   -3/31/2021 TTE - LVEF 55-60%. Unremarkable  -Holter monitor evaluation 4/2021 - was told her PVCs are minor (2%) and offered her medications if she wanted it - she declined - feeling fine.  -2/1/24 cardiologist Dr. Martinez. EKG - NSR, right axis deviation, no ischemia, borderline  -2/19/24 Holter monitor x 1 week - normal   -3/13/24 TTE at 21+ wk LVEF 60-65%. normal     Subclinical hyperthyroidism  -Low TSH with normal FT4 & FT3 noted on initial prenatal labs & in 2nd trimester. No treatment needed      Vitamin D deficiency  -1/6/24 Vit D 12.8 at 12 wk   -optimal dosing in pregnancy is controversial, but given starting out very low, Rx 50,000 international units weekly for 8 weeks, then switch to 4000 units daily over the counter in addition to prenatal vitamin      Bloating, constipation/difficulty evacuating stools, JAKI  -encouraged increased fiber & water  -possible pelvic floor dysfunction  - is seeing therapist      Pap neg, +HPV 18/45 (11/27/23)  -2/7 - 16w5d - colposcopy - cervical biopsy polypoid endocervical glandular tissue - Dr. Rock   -recommend pap & HPV co-testing at 1 year.

## 2024-04-23 DIAGNOSIS — O24.410 DIET CONTROLLED GESTATIONAL DIABETES MELLITUS (GDM) IN SECOND TRIMESTER (HCC): ICD-10-CM

## 2024-04-24 ENCOUNTER — APPOINTMENT (OUTPATIENT)
Dept: PHYSICAL THERAPY | Facility: HOSPITAL | Age: 34
End: 2024-04-24
Attending: OBSTETRICS & GYNECOLOGY
Payer: MEDICAID

## 2024-04-24 ENCOUNTER — TELEPHONE (OUTPATIENT)
Dept: PERINATAL CARE | Facility: HOSPITAL | Age: 34
End: 2024-04-24

## 2024-04-24 ENCOUNTER — TELEPHONE (OUTPATIENT)
Dept: PHYSICAL THERAPY | Facility: HOSPITAL | Age: 34
End: 2024-04-24

## 2024-04-24 RX ORDER — GLUCOSAMINE HCL/CHONDROITIN SU 500-400 MG
CAPSULE ORAL
Qty: 400 STRIP | Refills: 2 | OUTPATIENT
Start: 2024-04-24

## 2024-04-24 NOTE — TELEPHONE ENCOUNTER
LOV: 2/12/24    RTC: 6 months (around 8/12/2024)    FU: scheduled 8/16/24    Last Refill: 2/12/24- 90 days at 4x/day with 2 refills.     Denied, MyChart message sent to patient.

## 2024-04-25 NOTE — TELEPHONE ENCOUNTER
A refill request was received for:  Requested Prescriptions     Pending Prescriptions Disp Refills    insulin glargine (LANTUS SOLOSTAR) 100 UNIT/ML Subcutaneous Solution Pen-injector  0     Sig: Inject 52 Units into the skin at bedtime. Use a new needle with each injection       Last refill date:   4/16/2024    Last office visit: 11/14/2023    Follow up due:  Future Appointments   Date Time Provider Department Center   4/26/2024  3:00 PM  PNORM1 [facility]   5/1/2024  3:45 PM Trish Sky, PT  PHYS  [facility]   5/7/2024  2:00 PM Samreen Rivera MD [facility]   5/8/2024  3:45 PM Trish Sky, PT [facility]   5/15/2024  4:30 PM Trish Sky, PT [facility]   5/21/2024  1:30 PM Krupa Welch MD [facility]   5/22/2024  3:45 PM Trish Sky, PT [facility]   5/29/2024 11:45 AM Noelle Del Angel DO [facility]   5/29/2024  3:45 PM Trish Sky, PT [facility]   8/16/2024  9:30 AM April Burden APN [facility]

## 2024-04-26 ENCOUNTER — OFFICE VISIT (OUTPATIENT)
Dept: PERINATAL CARE | Facility: HOSPITAL | Age: 34
End: 2024-04-26
Attending: OBSTETRICS & GYNECOLOGY
Payer: MEDICAID

## 2024-04-26 VITALS
WEIGHT: 226 LBS | HEIGHT: 64 IN | BODY MASS INDEX: 38.58 KG/M2 | DIASTOLIC BLOOD PRESSURE: 76 MMHG | HEART RATE: 91 BPM | SYSTOLIC BLOOD PRESSURE: 124 MMHG

## 2024-04-26 DIAGNOSIS — O99.210 OBESITY AFFECTING PREGNANCY (HCC): ICD-10-CM

## 2024-04-26 DIAGNOSIS — O16.3 HYPERTENSION AFFECTING PREGNANCY IN THIRD TRIMESTER (HCC): ICD-10-CM

## 2024-04-26 DIAGNOSIS — O24.419 GDM (GESTATIONAL DIABETES MELLITUS) (HCC): Primary | ICD-10-CM

## 2024-04-26 DIAGNOSIS — O24.419 GDM (GESTATIONAL DIABETES MELLITUS) (HCC): ICD-10-CM

## 2024-04-26 DIAGNOSIS — O16.9 HYPERTENSION AFFECTING PREGNANCY (HCC): ICD-10-CM

## 2024-04-26 DIAGNOSIS — O24.414 INSULIN CONTROLLED GESTATIONAL DIABETES MELLITUS (GDM) IN THIRD TRIMESTER (HCC): ICD-10-CM

## 2024-04-26 DIAGNOSIS — O99.213 OTHER OBESITY DUE TO EXCESS CALORIES AFFECTING PREGNANCY IN THIRD TRIMESTER (HCC): ICD-10-CM

## 2024-04-26 DIAGNOSIS — E66.09 OTHER OBESITY DUE TO EXCESS CALORIES AFFECTING PREGNANCY IN THIRD TRIMESTER (HCC): ICD-10-CM

## 2024-04-26 PROCEDURE — 76816 OB US FOLLOW-UP PER FETUS: CPT | Performed by: OBSTETRICS & GYNECOLOGY

## 2024-04-26 RX ORDER — INSULIN GLARGINE 100 [IU]/ML
52 INJECTION, SOLUTION SUBCUTANEOUS NIGHTLY
Qty: 54 ML | Refills: 1 | Status: SHIPPED | OUTPATIENT
Start: 2024-04-26

## 2024-04-26 NOTE — PROGRESS NOTES
Outpatient Maternal-Fetal Medicine Follow-Up    Dear Dr. Casanova    Thank you for requesting an ultrasound and maternal-fetal medicine consultation on your patient Tammy Ramirez.  As you are aware she is a 33 year old female  with a dillon pregnancy and an Estimated Date of Delivery: 24.  She returned to maternal-fetal medicine today for a follow-up visit.  Her history was reviewed from her prior visit and there were no interval changes.    Antepartum Risk Factors  Class II obesity complicating pregnancy  Chronic migraines  Hypertension related to severe migraine    S: She reports good fetal movement.  Her home blood pressures range from 120-132 systolic over 76-84 diastolic.    She is nervous that her diabetes and blood sugars are currently harmful to the baby.  I reassured her that she is overall doing very well with her blood sugar control.  We have gotten her fastings down from the 110s to the 90s.  I explained to her that she can anticipate her insulin dose will need to continue to increase due to physiologic changes of pregnancy  PHYSICAL EXAMINATION:  /76 (BP Location: Right arm, Patient Position: Sitting, Cuff Size: large)   Pulse 91   Ht 5' 4\" (1.626 m)   Wt 226 lb (102.5 kg)   LMP 2023 (Exact Date)   BMI 38.79 kg/m²   General: alert and oriented, no acute distress  Abdomen: gravid, soft, non-tender  Extremities: non-tender, no edema    OBSTETRIC ULTRASOUND  The patient had a follow-up ultrasound today which revealed size consistent with dates.    Ultrasound Findings:  Single IUP in cephalic presentation.    Placenta is anterior.   A 3 vessel cord is noted.  Cardiac activity is present at 144 bpm  EFW 1323 g ( 2 lb 15 oz); 67%.    MVP is 5.7 cm . DEBBIE 17.9 cm    The fetal measurements are consistent with established EDC. No gross ultrasound evidence of structural abnormalities are seen today. The patient understands that ultrasound cannot rule out all structural and  chromosomal abnormalities.     Uterus and adnexa appeared normal  today on US    I interpreted the results and reviewed them with the patient.    DISCUSSION  During her visit we discussed and reviewed the following issues:  OBESITY  See prior MFM notes for a detailed review.    MIGRAINES  See prior MFM notes for a detailed review.    GESTATIONAL DIABETES- follow up    3 hour GTT  Lab Results   Component Value Date    GLUFG 95 (H) 2024    GLU1G 198 (H) 2024    GLU2G 170 (H) 2024    GLU3G 135 2024       We reviewed the potential implications and risks associated with GDM to her and her fetus, especially when poorly controlled. We discussed the increased incidence of macrosomia and the potential for related birth injury to her and her baby. We talked about the increase risks associated risk of fetal hyperinsulinemia, jaundice, electrolyte imbalance, seizure activity, IUFD and other adverse obstetric outcomes. We also reviewed her  increased risk of having diabetes later in life. The importance of good glycemic control and avoidance of prolonged hypoglycemia and hyperglycemia was addressed.  See MFM note from 3/1/2024 for detailed review    Her capillary blood glucose records were not reviewed today; last log 24   Her overall glucose control is good with only a few fasting blood sugars above 95    She is taking Lantus 52 units nightly which she was advised to continue.  MFM will continue to evaluate the blood sugars every week and make adjustments as indicated.    I answered her questions regarding timing of delivery and  testing.    HYPERTENSION  Discussion:  We discussed the potential implications associated with chronic hypertension.   I informed the patient that chronic hypertension increases the risk of pre-eclampsia, placental abruption, IUGR, fetal birth defects (cardiac and neural tube), and fetal demise and possible need for  delivery.  The signs and symptoms of  preeclampsia were discussed.   We also discussed the potential risks and benefits of low dose aspirin and anti-hypertensive medication.  She understands that a better diet, weight loss and routine exercise can help her blood pressure for the rest of her life.  We discussed the morbidity associated with hypertension including heart disease, strokes and kidney disease.     Preeclampsia Prevention  Many studies have been performed to try to find a way to prevent preeclampsia.  These include the use of fish oil, vitamin C, Vitamin E, calcium and aspirin.  A few studies have shown benefit with aspirin but others have not.  Aspirin therapy is not recommended for women at low-risk for development of preeclampsia. We suggest use of low dose aspirin in women at moderate to high risk of developing preeclampsia, but no therapy is a reasonable alternative.   No drug prevents progression to more severe disease; use of any drug in this setting is not recommended.     BP Review  The patient is presently  taking anti-hypertensive medications.  Her blood pressure readings at home have been 120-132/76-84.       Medical Regimen Recommendation: Labetalol 100 mg BID and start low-dose ASA.     Continued medication use and home blood pressure assessments were reviewed as well as recommendations for serial growth ultrasounds and antepartum testing.      We reviewed the signs and symptoms of preeclampsia,  labor and monitoring fetal movement.  We discussed reasons for her to call her physician.    We discussed the recommended plan of care based on her  risk factors.  Tammy and her significant other, Sergio, had their questions answered to their satisfaction.    IMPRESSION:  IUP at 28w0d  Normal fetal growth  Class II obesity complicating pregnancy  Chronic migraines  Hypertension, chornic  GDM A2     RECOMMENDATIONS:  Continue care with Dr. Casanova  Continue insulin  Continue four times daily capillary blood glucose  assessments (fasting and 2 hour postprandial)  Weekly Maternal-Fetal Medicine review of capillary blood glucose values  Follow-up growth & BPP  ultrasound every 4 weeks   Weekly NSTs at 32 weeks; twice weekly NST's at 34 weeks  Delivery at 38-39  weeks advised for suboptimally medication controlled diabetes and cHTN  She is to continue to follow with the pain management team for her migraines  Low-dose aspirin, 2 tablets (162 mg total daily)  Limit gestational weight gain to 20 pounds or less    Thank you for allowing me to participate in the care of your patient.  Please do not hesitate to contact me if additional questions or concerns arise.      Idalia Rosado M.D.    40 minutes spent in review of records, patient consultation, documentation and coordination of care.  The relevant clinical matter(s) are summarized above.     Note to patient and family  The 21st Century Cures Act makes medical notes available to patients in the interest of transparency.  However, please be advised that this is a medical document.  It is intended as [id number] communication.  It is written and medical language may contain abbreviations or verbiage that are technical and unfamiliar.  It may appear blunt or direct.  Medical documents are intended to carry relevant information, facts as evident, and the clinical opinion of the practitioner.

## 2024-05-01 ENCOUNTER — APPOINTMENT (OUTPATIENT)
Dept: PHYSICAL THERAPY | Facility: HOSPITAL | Age: 34
End: 2024-05-01
Attending: OBSTETRICS & GYNECOLOGY
Payer: MEDICAID

## 2024-05-01 ENCOUNTER — TELEPHONE (OUTPATIENT)
Dept: PERINATAL CARE | Facility: HOSPITAL | Age: 34
End: 2024-05-01

## 2024-05-01 NOTE — TELEPHONE ENCOUNTER
28w5d  Received BS log for date range 4/25-4/30     Low  High Out of Range   Fasting Blood Sugar 86 106 4/6   Post Breakfast 95 113 0/5   Post Lunch 92 115 0/5   Post Dinner 91 115 0/5     Patient is currently taking Lantus 52 units at bedtime.   To Dr. Pratt for review.

## 2024-05-02 RX ORDER — INSULIN GLARGINE 100 [IU]/ML
60 INJECTION, SOLUTION SUBCUTANEOUS NIGHTLY
Qty: 54 ML | Refills: 1 | Status: SHIPPED | OUTPATIENT
Start: 2024-05-02

## 2024-05-07 ENCOUNTER — ROUTINE PRENATAL (OUTPATIENT)
Facility: CLINIC | Age: 34
End: 2024-05-07
Payer: MEDICAID

## 2024-05-07 VITALS
HEIGHT: 64 IN | HEART RATE: 108 BPM | DIASTOLIC BLOOD PRESSURE: 68 MMHG | BODY MASS INDEX: 39.44 KG/M2 | SYSTOLIC BLOOD PRESSURE: 122 MMHG | WEIGHT: 231 LBS

## 2024-05-07 DIAGNOSIS — Z34.90 PRENATAL CARE, ANTEPARTUM (HCC): Primary | ICD-10-CM

## 2024-05-07 PROCEDURE — 99212 OFFICE O/P EST SF 10 MIN: CPT | Performed by: STUDENT IN AN ORGANIZED HEALTH CARE EDUCATION/TRAINING PROGRAM

## 2024-05-07 RX ORDER — PANTOPRAZOLE SODIUM 40 MG/1
40 TABLET, DELAYED RELEASE ORAL DAILY
Qty: 30 TABLET | Refills: 11 | Status: SHIPPED | OUTPATIENT
Start: 2024-05-07

## 2024-05-07 NOTE — PROGRESS NOTES
KYLEIGH - 29w4d     Pt having a lot of heartburn, is taking famotidine but not helping enough. Will rx protonix  Baby very active      FRANCIS 7/19/24 by 6w3d US not c/w LMP   O+     GIRL  -NIPS neg, NT normal.    -carrier neg   -AFP low risk    -Flu 23-24 - declines   -2023 COVID-19 booster encouraged   -CBC, 3rd tri HIV and T Pallidum discussed - ordered, reminded  -TDAP - declined  -classes, pre-registration, pediatrician (Dr. Long), breast pump, car seat discussed   -H/o spinal headaches after epidural      Chronic Hypertension - started on labetalol at 17 wk   -Never formally diagnosed with CHTN, but multiple high readings prior to pregnancy (per EMR since at least 2018). See previous OB notes. Hypertensive even at 6 wk  -aspirin recommended - taking 81 mg tab x 2 daily   -Baseline EKG - 1/21 - ABNORMAL. Cannot rule out anterior infarct   -Baseline CMP & urine P/C normal. TSH 0.235 (L) - 12/24  -1/24/24 - /90 - 14w5d (NO CURRENT HA.) Patient bp at home 120's-130's/70-80's  -Recommend surveillance as if chronic HTN - reviewed potential for placental damage, fetal growth restriction, stillbirth. M Dr. Rosado in agreement    -BP monitoring at home   -2/7/24 /98 - 16w5d - Rx labetalol 100 mg BID   -L2 US normal   -Growth US q 4 wk in 3rd trim (with BPP at or beyond 32 wk) - ordered   Appt  4/26: EFW 67%ile, vertex, DEBBIE 18   Appt 5/24                      -Weekly NSTs at 32 weeks; twice weekly NST's at 34 wk     #ABNORMAL EKG - 1/21/24  -1/21/24 - EKG report reads cannot rule out anterior infarct. Appears changed from 1/13/23 EKG.   -Cardiology 2/1/24 Dr. Martinez.                Dr. Martinez says 1/21/24 EKG was just low voltage in lead V3, no infarct  2/1/24 EKG - NSR, right axis deviation, no ischemia, borderline  2/19/24 Holter monitor x 1 week - NSR (HR  bpm, mean 92). No Afib  3/13/24 TTE at 21+ wk LVEF 60-65%. No wall motion abnormality.   No significant valvular stenosis or regurgitation.   -appt   3/19 to discuss results of Holter & Echo with cardiologist.       GDM on insulin per MFM  -Dx 16 wk -> insulin by 20 wk   -Diabetes education done 2/12/24. Follow up visit 3/4/24   -3/6/24 Lantus 24 units HS per MFM - now 60U at bedtime as of 5/7  -A1c about once per trimester - order placed.   -growth US & NSTs as above  Delivery at 38-39  weeks advised for suboptimally medication controlled diabetes & HTN       Obesity (pre preg BMI 36)   -limit weight gain 11-20 lb   -L2US, growth US, NSTs per above     H/o intractable migraines  -onset age 11.  20 or more migraines per month.   -only 4-5 hours of sleep on regular basis   -MRI C spine 2020 - Mild multilevel disc desiccation and bulging without significant loss of intervertebral disc space height. No significant spinal canal or neuroforaminal stenosis.   -MRI brain 2/12/21 - No acute intracranial abnormality.   -MRI c spine 8/21 - unremarkable. Done for left-sided facial and arm   numbness and radiculopathy.   -Past treatments: Botox, Nurtec, Aimovig, Ajovy, Amitriptyline, nortriptyline, propranolol, Depakote, Topamax, venlafaxine, Norco, Fioricet, multiple triptans, flexeril, and OTC analgesics. She has had facet and cervical epidurals as well as SPG block none of which provided much relief. Osteopathic manipulation therapy, PT, chiropractic, massage acupuncture, magnesium, CoQ10, vitamin B complex, elimination diet.   -Botox helped partially in the past   -Neurologist Dr. Robe Gutierrez - currently taking Naratriptan 2.5 mg PRN  -Took Qulipta x 1 week & seemed to be working - stopped when found out she is pregnant.   -per patient, when she notified Dr. Gutierrez she is pregnant, he is no longer comfortable managing her migraine medication.   -Pain management 12/7/23 bilateral occipital neuralgia. Migraines originate from the occipital region, L>R. Bilateral occipital block, pending insurance approval and OBGyn approval.  Follow-up 2 to 3 weeks thereafter.    -1/10/24 - Bilateral greater occipital nerve block done. 100% improvement for 24 hr.   -1/24/24 - Sleep study ordered - not yet.    -2/2/24 Pain management f/u visit - consider repeat nerve blocks  -3/18 - 22w3d - migraines have improved significantly since around 19 wk (Note: started labetalol at 17 wk). Does not feel will need nerve block again at this time.   -her migraines have improved, takes Naratriptan only when needed      CHEYANNE, insomnia  -meds: none currently. H/o zoloft, clonazepam, venlafaxine   -therapist - not currently. Not very interested. Resources given   -3/18 - 22w3d - Rx hydroxyzine PRN      H/o PVCs  -Fhx Brugada syndrome - she tested negative   -3/31/2021 TTE - LVEF 55-60%. Unremarkable  -Holter monitor evaluation 4/2021 - was told her PVCs are minor (2%) and offered her medications if she wanted it - she declined - feeling fine.  -2/1/24 cardiologist Dr. Martinez. EKG - NSR, right axis deviation, no ischemia, borderline  -2/19/24 Holter monitor x 1 week - normal   -3/13/24 TTE at 21+ wk LVEF 60-65%. normal     Subclinical hyperthyroidism  -Low TSH with normal FT4 & FT3 noted on initial prenatal labs & in 2nd trimester. No treatment needed      Vitamin D deficiency  -1/6/24 Vit D 12.8 at 12 wk   -optimal dosing in pregnancy is controversial, but given starting out very low, Rx 50,000 international units weekly for 8 weeks, then switch to 4000 units daily over the counter in addition to prenatal vitamin      Bloating, constipation/difficulty evacuating stools, JAKI  -encouraged increased fiber & water  -possible pelvic floor dysfunction  - is seeing therapist      Pap neg, +HPV 18/45 (11/27/23)  -2/7 - 16w5d - colposcopy - cervical biopsy polypoid endocervical glandular tissue - Dr. Rock   -recommend pap & HPV co-testing at 1 year.

## 2024-05-08 ENCOUNTER — APPOINTMENT (OUTPATIENT)
Dept: PHYSICAL THERAPY | Facility: HOSPITAL | Age: 34
End: 2024-05-08
Attending: OBSTETRICS & GYNECOLOGY
Payer: MEDICAID

## 2024-05-10 RX ORDER — INSULIN GLARGINE 100 [IU]/ML
60 INJECTION, SOLUTION SUBCUTANEOUS NIGHTLY
Qty: 54 ML | Refills: 1 | Status: SHIPPED | OUTPATIENT
Start: 2024-05-10

## 2024-05-15 ENCOUNTER — APPOINTMENT (OUTPATIENT)
Dept: PHYSICAL THERAPY | Facility: HOSPITAL | Age: 34
End: 2024-05-15
Attending: OBSTETRICS & GYNECOLOGY
Payer: MEDICAID

## 2024-05-16 ENCOUNTER — TELEPHONE (OUTPATIENT)
Dept: PERINATAL CARE | Facility: HOSPITAL | Age: 34
End: 2024-05-16

## 2024-05-16 RX ORDER — INSULIN GLARGINE 100 [IU]/ML
66 INJECTION, SOLUTION SUBCUTANEOUS NIGHTLY
COMMUNITY
Start: 2024-05-16

## 2024-05-16 NOTE — TELEPHONE ENCOUNTER
New regimen:    Lantus 66 units HS (split dose)     Tyler Marroquin M.D.  Maternal-Fetal Medicine

## 2024-05-16 NOTE — TELEPHONE ENCOUNTER
30w6d  Received BS log for date range 5/9-5/16     Low  High Out of Range   Fasting Blood Sugar 88 104 4/8   Post Breakfast 87 122 1/7   Post Lunch 76 130 1/6   Post Dinner 101 120 1/7     Patient is currently taking:    Lantus 60 units HS (split dose)     To Dr. Marroquin for review.

## 2024-05-17 ENCOUNTER — LAB ENCOUNTER (OUTPATIENT)
Dept: LAB | Facility: HOSPITAL | Age: 34
End: 2024-05-17
Attending: Other

## 2024-05-17 DIAGNOSIS — Z36.9 ANTENATAL SCREENING ENCOUNTER (HCC): ICD-10-CM

## 2024-05-17 LAB
BASOPHILS # BLD AUTO: 0.02 X10(3) UL (ref 0–0.2)
BASOPHILS NFR BLD AUTO: 0.2 %
EOSINOPHIL # BLD AUTO: 0.03 X10(3) UL (ref 0–0.7)
EOSINOPHIL NFR BLD AUTO: 0.2 %
ERYTHROCYTE [DISTWIDTH] IN BLOOD BY AUTOMATED COUNT: 12.9 %
HCT VFR BLD AUTO: 33.4 %
HGB BLD-MCNC: 11.4 G/DL
IMM GRANULOCYTES # BLD AUTO: 0.12 X10(3) UL (ref 0–1)
IMM GRANULOCYTES NFR BLD: 1 %
LYMPHOCYTES # BLD AUTO: 1.81 X10(3) UL (ref 1–4)
LYMPHOCYTES NFR BLD AUTO: 14.6 %
MCH RBC QN AUTO: 30.2 PG (ref 26–34)
MCHC RBC AUTO-ENTMCNC: 34.1 G/DL (ref 31–37)
MCV RBC AUTO: 88.6 FL
MONOCYTES # BLD AUTO: 0.66 X10(3) UL (ref 0.1–1)
MONOCYTES NFR BLD AUTO: 5.3 %
NEUTROPHILS # BLD AUTO: 9.74 X10 (3) UL (ref 1.5–7.7)
NEUTROPHILS # BLD AUTO: 9.74 X10(3) UL (ref 1.5–7.7)
NEUTROPHILS NFR BLD AUTO: 78.7 %
PLATELET # BLD AUTO: 259 10(3)UL (ref 150–450)
RBC # BLD AUTO: 3.77 X10(6)UL
T PALLIDUM AB SER QL IA: NONREACTIVE
WBC # BLD AUTO: 12.4 X10(3) UL (ref 4–11)

## 2024-05-17 PROCEDURE — 36415 COLL VENOUS BLD VENIPUNCTURE: CPT

## 2024-05-17 PROCEDURE — 86780 TREPONEMA PALLIDUM: CPT

## 2024-05-17 PROCEDURE — 87389 HIV-1 AG W/HIV-1&-2 AB AG IA: CPT

## 2024-05-17 PROCEDURE — 85025 COMPLETE CBC W/AUTO DIFF WBC: CPT

## 2024-05-21 ENCOUNTER — ROUTINE PRENATAL (OUTPATIENT)
Facility: CLINIC | Age: 34
End: 2024-05-21

## 2024-05-21 VITALS
SYSTOLIC BLOOD PRESSURE: 118 MMHG | BODY MASS INDEX: 40.12 KG/M2 | WEIGHT: 235 LBS | HEART RATE: 104 BPM | DIASTOLIC BLOOD PRESSURE: 78 MMHG | HEIGHT: 64 IN

## 2024-05-21 DIAGNOSIS — R12 HEARTBURN DURING PREGNANCY IN THIRD TRIMESTER (HCC): ICD-10-CM

## 2024-05-21 DIAGNOSIS — F41.9 ANXIETY DISORDER AFFECTING PREGNANCY, ANTEPARTUM (HCC): ICD-10-CM

## 2024-05-21 DIAGNOSIS — Z34.90 PRENATAL CARE, ANTEPARTUM (HCC): Primary | ICD-10-CM

## 2024-05-21 DIAGNOSIS — O24.414 INSULIN CONTROLLED GESTATIONAL DIABETES MELLITUS (GDM) IN SECOND TRIMESTER (HCC): ICD-10-CM

## 2024-05-21 DIAGNOSIS — O99.340 ANXIETY DISORDER AFFECTING PREGNANCY, ANTEPARTUM (HCC): ICD-10-CM

## 2024-05-21 DIAGNOSIS — Z3A.31 31 WEEKS GESTATION OF PREGNANCY (HCC): ICD-10-CM

## 2024-05-21 DIAGNOSIS — O10.919 CHRONIC HYPERTENSION AFFECTING PREGNANCY (HCC): ICD-10-CM

## 2024-05-21 DIAGNOSIS — O26.893 HEARTBURN DURING PREGNANCY IN THIRD TRIMESTER (HCC): ICD-10-CM

## 2024-05-21 PROCEDURE — 99214 OFFICE O/P EST MOD 30 MIN: CPT | Performed by: STUDENT IN AN ORGANIZED HEALTH CARE EDUCATION/TRAINING PROGRAM

## 2024-05-21 RX ORDER — FERROUS SULFATE 325(65) MG
TABLET ORAL
Qty: 90 TABLET | Refills: 3 | Status: SHIPPED | OUTPATIENT
Start: 2024-05-21

## 2024-05-21 NOTE — PROGRESS NOTES
KYLEIGH - 31.4  Partner Sergio here for support    Protonix is working well for her GERD!  +FM, no LOF, no VB, +BH  CBGs within normal limits      FRANCIS 7/19/24 by 6w3d US not c/w LMP   O+     GIRL  -NIPS neg, NT normal.    -carrier neg   -AFP low risk    -Flu 23-24 - declines   -2023 COVID-19 booster encouraged   -CBC, 3rd tri HIV and T Pallidum discussed - ordered, reminded  -TDAP - declined  -classes, pre-registration, pediatrician (Dr. Long), breast pump, car seat discussed   -H/o spinal headaches after epidural      Chronic Hypertension - started on labetalol at 17 wk   -Never formally diagnosed with CHTN, but multiple high readings prior to pregnancy (per EMR since at least 2018). See previous OB notes. Hypertensive even at 6 wk  -aspirin recommended - taking 81 mg tab x 2 daily   -Baseline EKG - 1/21 - ABNORMAL. Cannot rule out anterior infarct   -Baseline CMP & urine P/C normal. TSH 0.235 (L) - 12/24  -1/24/24 - /90 - 14w5d (NO CURRENT HA.) Patient bp at home 120's-130's/70-80's  -Recommend surveillance as if chronic HTN - reviewed potential for placental damage, fetal growth restriction, stillbirth. M Dr. Rosado in agreement    -BP monitoring at home   -2/7/24 /98 - 16w5d - Rx labetalol 100 mg BID   -L2 US normal   -Growth US q 4 wk in 3rd trim (with BPP at or beyond 32 wk) - ordered   Appt  4/26: EFW 67%ile, vertex, DEBBIE 18   Appt 5/24   [ ]                    -Weekly NSTs at 32 weeks; twice weekly NST's at 34 wk     #ABNORMAL EKG - 1/21/24  -1/21/24 - EKG report reads cannot rule out anterior infarct. Appears changed from 1/13/23 EKG.   -Cardiology 2/1/24 Dr. Martinez.                Dr. Martinez says 1/21/24 EKG was just low voltage in lead V3, no infarct  2/1/24 EKG - NSR, right axis deviation, no ischemia, borderline  2/19/24 Holter monitor x 1 week - NSR (HR  bpm, mean 92). No Afib  3/13/24 TTE at 21+ wk LVEF 60-65%. No wall motion abnormality.   No significant valvular stenosis or  regurgitation.   -appt  3/19 to discuss results of Holter & Echo with cardiologist.  - plan follow up appointment 2 months post partum        GDM on insulin per MFM  -Dx 16 wk -> insulin by 20 wk   -Diabetes education done 2/12/24. Follow up visit 3/4/24   -3/6/24 Lantus 24 units HS per MFM - now 60U at bedtime as of 5/7  -A1c about once per trimester - order placed.   -growth US & NSTs as above  Delivery at 38-39  weeks advised for suboptimally medication controlled diabetes & HTN       Obesity (pre preg BMI 36)   -limit weight gain 11-20 lb   -L2US, growth US, NSTs per above     H/o intractable migraines  -onset age 11.  20 or more migraines per month.   -only 4-5 hours of sleep on regular basis   -MRI C spine 2020 - Mild multilevel disc desiccation and bulging without significant loss of intervertebral disc space height. No significant spinal canal or neuroforaminal stenosis.   -MRI brain 2/12/21 - No acute intracranial abnormality.   -MRI c spine 8/21 - unremarkable. Done for left-sided facial and arm   numbness and radiculopathy.   -Past treatments: Botox, Nurtec, Aimovig, Ajovy, Amitriptyline, nortriptyline, propranolol, Depakote, Topamax, venlafaxine, Norco, Fioricet, multiple triptans, flexeril, and OTC analgesics. She has had facet and cervical epidurals as well as SPG block none of which provided much relief. Osteopathic manipulation therapy, PT, chiropractic, massage acupuncture, magnesium, CoQ10, vitamin B complex, elimination diet.   -Botox helped partially in the past   -Neurologist Dr. Robe Gutierrez - currently taking Naratriptan 2.5 mg PRN  -Took Qulipta x 1 week & seemed to be working - stopped when found out she is pregnant.   -per patient, when she notified Dr. Gutierrez she is pregnant, he is no longer comfortable managing her migraine medication.   -Pain management 12/7/23 bilateral occipital neuralgia. Migraines originate from the occipital region, L>R. Bilateral occipital block, pending  insurance approval and OBGyn approval.  Follow-up 2 to 3 weeks thereafter.   -1/10/24 - Bilateral greater occipital nerve block done. 100% improvement for 24 hr.   -1/24/24 - Sleep study ordered - not yet.    -2/2/24 Pain management f/u visit - consider repeat nerve blocks  -3/18 - 22w3d - migraines have improved significantly since around 19 wk (Note: started labetalol at 17 wk). Does not feel will need nerve block again at this time.   -her migraines have improved, takes Naratriptan only when needed      CHEYANNE, insomnia  -meds: none currently. H/o zoloft, clonazepam, venlafaxine   -therapist - not currently. Not very interested. Resources given   -3/18 - 22w3d - Rx hydroxyzine PRN      H/o PVCs  -Fhx Brugada syndrome - she tested negative   -3/31/2021 TTE - LVEF 55-60%. Unremarkable  -Holter monitor evaluation 4/2021 - was told her PVCs are minor (2%) and offered her medications if she wanted it - she declined - feeling fine.  -2/1/24 cardiologist Dr. Martinez. EKG - NSR, right axis deviation, no ischemia, borderline  -2/19/24 Holter monitor x 1 week - normal   -3/13/24 TTE at 21+ wk LVEF 60-65%. normal     Subclinical hyperthyroidism  -Low TSH with normal FT4 & FT3 noted on initial prenatal labs & in 2nd trimester. No treatment needed      Vitamin D deficiency  -1/6/24 Vit D 12.8 at 12 wk   -optimal dosing in pregnancy is controversial, but given starting out very low, Rx 50,000 international units weekly for 8 weeks, then switch to 4000 units daily over the counter in addition to prenatal vitamin      Bloating, constipation/difficulty evacuating stools, JAKI  -encouraged increased fiber & water  -possible pelvic floor dysfunction  - is seeing therapist      Pap neg, +HPV 18/45 (11/27/23)  -2/7 - 16w5d - colposcopy - cervical biopsy polypoid endocervical glandular tissue - Dr. Rock   -recommend pap & HPV co-testing at 1 year.

## 2024-05-22 ENCOUNTER — APPOINTMENT (OUTPATIENT)
Dept: PHYSICAL THERAPY | Facility: HOSPITAL | Age: 34
End: 2024-05-22
Attending: OBSTETRICS & GYNECOLOGY
Payer: MEDICAID

## 2024-05-22 NOTE — PROGRESS NOTES
Outpatient Maternal-Fetal Medicine Follow-Up    Dear Dr. Casanova    Thank you for requesting an ultrasound and maternal-fetal medicine consultation on your patient Tammy Ramirez.  As you are aware she is a 33 year old female  with a dillon pregnancy and an Estimated Date of Delivery: 24.  She returned to maternal-fetal medicine today for a follow-up visit.  Her history was reviewed from her prior visit and there were no interval changes.    Antepartum Risk Factors  Class II obesity complicating pregnancy  Chronic migraines  Hypertension related to severe migraine  GDM A2    S: She reports good fetal movement.  Her home blood pressures are normal      PHYSICAL EXAMINATION:  /85 (BP Location: Right arm, Patient Position: Sitting, Cuff Size: large)   Pulse 95   Ht 5' 4\" (1.626 m)   Wt 235 lb (106.6 kg)   LMP 2023 (Exact Date)   BMI 40.34 kg/m²   General: alert and oriented, no acute distress  Abdomen: obese, gravid, soft, non-tender  Extremities: non-tender, no edema    OBSTETRIC ULTRASOUND  The patient had a follow-up ultrasound today which revealed size consistent with dates and reassuring  testing.    Ultrasound Findings:  Single IUP in cephalic presentation.    Placenta is anterior.   Cardiac activity is present at 137 bpm  EFW 2146 g ( 4 lb 12 oz); 65%.    DEBBIE is  17.8 cm.  MVP is 5.6 cm  BPP is 8/8.     The fetal measurements are consistent with established EDC. No gross ultrasound evidence of structural abnormalities are seen today. The patient understands that ultrasound cannot rule out all structural and chromosomal abnormalities.     Uterus and adnexa appeared normal  today on US    I interpreted the results and reviewed them with the patient.    DISCUSSION  During her visit we discussed and reviewed the following issues:  OBESITY  See prior MFM notes for a detailed review.    MIGRAINES  See prior M notes for a detailed review.    GESTATIONAL DIABETES- follow  up  We reviewed the potential implications and risks associated with GDM to her and her fetus, especially when poorly controlled. We discussed the increased incidence of macrosomia and the potential for related birth injury to her and her baby. We talked about the increase risks associated risk of fetal hyperinsulinemia, jaundice, electrolyte imbalance, seizure activity, IUFD and other adverse obstetric outcomes. We also reviewed her  increased risk of having diabetes later in life. The importance of good glycemic control and avoidance of prolonged hypoglycemia and hyperglycemia was addressed.  See MFM note from 3/1/2024 for detailed review    Her capillary blood glucose records were reviewed today; BS controlled   Her overall glucose control is good    She is taking Lantus 66 units nightly which she was advised to continue.  MFM will continue to evaluate the blood sugars every week and make adjustments as indicated.    Insulin dose:  Lantus 66 units nightly which she was advised to continue.    I answered her questions regarding timing of delivery and  testing.    HYPERTENSION  Discussion:  We discussed the potential implications associated with chronic hypertension.   I informed the patient that chronic hypertension increases the risk of pre-eclampsia, placental abruption, IUGR, fetal birth defects (cardiac and neural tube), and fetal demise and possible need for  delivery.  The signs and symptoms of preeclampsia were discussed.   We also discussed the potential risks and benefits of low dose aspirin and anti-hypertensive medication.  She understands that a better diet, weight loss and routine exercise can help her blood pressure for the rest of her life.  We discussed the morbidity associated with hypertension including heart disease, strokes and kidney disease.  See prior [facility] notes for detailed review.    BP Review  The patient is presently  taking anti-hypertensive medications.  Her blood  pressure readings at home have been 120's-130'75-80.       Medical Regimen Recommendation: Labetalol 100 mg BID and start low-dose ASA.     Continued medication use and home blood pressure assessments were reviewed as well as recommendations for serial growth ultrasounds and antepartum testing.      We reviewed the signs and symptoms of preeclampsia,  labor and monitoring fetal movement.  We discussed reasons for her to call her physician.    We discussed the recommended plan of care based on her  risk factors.  Tammy and her significant other, Sergio, had their questions answered to their satisfaction.    IMPRESSION:  IUP at 32w0d  Normal fetal growth &  testing  Class II obesity complicating pregnancy  Chronic migraines  Hypertension, chornic  GDM A2     RECOMMENDATIONS:  Continue care with Dr. Casanova  Continue insulin  Continue four times daily capillary blood glucose assessments (fasting and 2 hour postprandial)  Weekly Maternal-Fetal Medicine review of capillary blood glucose values  Follow-up growth & BPP  ultrasound every 4 weeks   Weekly NSTs and increase to twice weekly NST's at 34 weeks  Delivery at 38-39  weeks advised for suboptimally medication controlled diabetes and cHTN  She is to continue to follow with the pain management team for her migraines  Low-dose aspirin, 2 tablets (162 mg total daily)  Limit gestational weight gain to 20 pounds or less    Thank you for allowing me to participate in the care of your patient.  Please do not hesitate to contact me if additional questions or concerns arise.      Idalia Rosado M.D.    30 minutes spent in review of records, patient consultation, documentation and coordination of care.  The relevant clinical matter(s) are summarized above.     Note to patient and family  The 21st Century Cures Act makes medical notes available to patients in the interest of transparency.  However, please be advised that this is a medical document.  It  is intended as jpia-ma-oyml communication.  It is written and medical language may contain abbreviations or verbiage that are technical and unfamiliar.  It may appear blunt or direct.  Medical documents are intended to carry relevant information, facts as evident, and the clinical opinion of the practitioner.

## 2024-05-24 ENCOUNTER — OFFICE VISIT (OUTPATIENT)
Dept: PERINATAL CARE | Facility: HOSPITAL | Age: 34
End: 2024-05-24
Attending: OBSTETRICS & GYNECOLOGY

## 2024-05-24 VITALS
WEIGHT: 235 LBS | DIASTOLIC BLOOD PRESSURE: 85 MMHG | HEIGHT: 64 IN | SYSTOLIC BLOOD PRESSURE: 120 MMHG | BODY MASS INDEX: 40.12 KG/M2 | HEART RATE: 95 BPM

## 2024-05-24 DIAGNOSIS — O24.419 GDM (GESTATIONAL DIABETES MELLITUS) (HCC): ICD-10-CM

## 2024-05-24 DIAGNOSIS — I10 CHRONIC HYPERTENSION: ICD-10-CM

## 2024-05-24 DIAGNOSIS — O24.414 INSULIN CONTROLLED GESTATIONAL DIABETES MELLITUS (GDM) IN THIRD TRIMESTER (HCC): ICD-10-CM

## 2024-05-24 DIAGNOSIS — O99.213 MATERNAL MORBID OBESITY, ANTEPARTUM, THIRD TRIMESTER (HCC): ICD-10-CM

## 2024-05-24 DIAGNOSIS — E66.01 MATERNAL MORBID OBESITY, ANTEPARTUM, THIRD TRIMESTER (HCC): ICD-10-CM

## 2024-05-24 DIAGNOSIS — I10 CHRONIC HYPERTENSION: Primary | ICD-10-CM

## 2024-05-24 PROCEDURE — 76816 OB US FOLLOW-UP PER FETUS: CPT | Performed by: OBSTETRICS & GYNECOLOGY

## 2024-05-24 PROCEDURE — 76819 FETAL BIOPHYS PROFIL W/O NST: CPT

## 2024-05-29 ENCOUNTER — APPOINTMENT (OUTPATIENT)
Dept: PHYSICAL THERAPY | Facility: HOSPITAL | Age: 34
End: 2024-05-29
Attending: OBSTETRICS & GYNECOLOGY
Payer: MEDICAID

## 2024-05-29 ENCOUNTER — TELEPHONE (OUTPATIENT)
Facility: CLINIC | Age: 34
End: 2024-05-29

## 2024-05-29 ENCOUNTER — HOSPITAL ENCOUNTER (OUTPATIENT)
Facility: HOSPITAL | Age: 34
Setting detail: OBSERVATION
Discharge: HOME | End: 2024-05-31
Attending: OBSTETRICS & GYNECOLOGY | Admitting: OBSTETRICS & GYNECOLOGY
Payer: MEDICAID

## 2024-05-29 ENCOUNTER — ROUTINE PRENATAL (OUTPATIENT)
Facility: CLINIC | Age: 34
End: 2024-05-29

## 2024-05-29 ENCOUNTER — APPOINTMENT (OUTPATIENT)
Dept: ULTRASOUND IMAGING | Facility: HOSPITAL | Age: 34
End: 2024-05-29
Attending: OBSTETRICS & GYNECOLOGY
Payer: MEDICAID

## 2024-05-29 VITALS
SYSTOLIC BLOOD PRESSURE: 130 MMHG | HEIGHT: 64 IN | HEART RATE: 94 BPM | DIASTOLIC BLOOD PRESSURE: 82 MMHG | WEIGHT: 236.81 LBS | BODY MASS INDEX: 40.43 KG/M2

## 2024-05-29 DIAGNOSIS — O36.8190 DECREASED FETAL MOVEMENT AFFECTING MANAGEMENT OF PREGNANCY, ANTEPARTUM, SINGLE OR UNSPECIFIED FETUS (HCC): Primary | ICD-10-CM

## 2024-05-29 PROBLEM — Z34.90 PREGNANCY (HCC): Status: ACTIVE | Noted: 2024-05-29

## 2024-05-29 LAB
ALBUMIN SERPL-MCNC: 2.6 G/DL (ref 3.4–5)
ALBUMIN/GLOB SERPL: 0.6 {RATIO} (ref 1–2)
ALP LIVER SERPL-CCNC: 110 U/L
ALT SERPL-CCNC: 24 U/L
ANION GAP SERPL CALC-SCNC: 9 MMOL/L (ref 0–18)
AST SERPL-CCNC: 16 U/L (ref 15–37)
BASOPHILS # BLD AUTO: 0.02 X10(3) UL (ref 0–0.2)
BASOPHILS NFR BLD AUTO: 0.1 %
BILIRUB SERPL-MCNC: 0.3 MG/DL (ref 0.1–2)
BUN BLD-MCNC: 6 MG/DL (ref 9–23)
CALCIUM BLD-MCNC: 9 MG/DL (ref 8.5–10.1)
CHLORIDE SERPL-SCNC: 108 MMOL/L (ref 98–112)
CO2 SERPL-SCNC: 20 MMOL/L (ref 21–32)
CREAT BLD-MCNC: 0.39 MG/DL
CREAT UR-SCNC: 66 MG/DL
EGFRCR SERPLBLD CKD-EPI 2021: 136 ML/MIN/1.73M2 (ref 60–?)
EOSINOPHIL # BLD AUTO: 0.05 X10(3) UL (ref 0–0.7)
EOSINOPHIL NFR BLD AUTO: 0.4 %
ERYTHROCYTE [DISTWIDTH] IN BLOOD BY AUTOMATED COUNT: 13.2 %
FASTING STATUS PATIENT QL REPORTED: NO
GLOBULIN PLAS-MCNC: 4.2 G/DL (ref 2.8–4.4)
GLUCOSE BLD-MCNC: 79 MG/DL (ref 70–99)
GLUCOSE BLD-MCNC: 84 MG/DL (ref 70–99)
HCT VFR BLD AUTO: 32.7 %
HGB BLD-MCNC: 10.7 G/DL
IMM GRANULOCYTES # BLD AUTO: 0.21 X10(3) UL (ref 0–1)
IMM GRANULOCYTES NFR BLD: 1.6 %
LYMPHOCYTES # BLD AUTO: 2.13 X10(3) UL (ref 1–4)
LYMPHOCYTES NFR BLD AUTO: 15.9 %
MCH RBC QN AUTO: 28.8 PG (ref 26–34)
MCHC RBC AUTO-ENTMCNC: 32.7 G/DL (ref 31–37)
MCV RBC AUTO: 87.9 FL
MONOCYTES # BLD AUTO: 0.74 X10(3) UL (ref 0.1–1)
MONOCYTES NFR BLD AUTO: 5.5 %
NEUTROPHILS # BLD AUTO: 10.24 X10 (3) UL (ref 1.5–7.7)
NEUTROPHILS # BLD AUTO: 10.24 X10(3) UL (ref 1.5–7.7)
NEUTROPHILS NFR BLD AUTO: 76.5 %
OSMOLALITY SERPL CALC.SUM OF ELEC: 281 MOSM/KG (ref 275–295)
PLATELET # BLD AUTO: 249 10(3)UL (ref 150–450)
POTASSIUM SERPL-SCNC: 3.6 MMOL/L (ref 3.5–5.1)
PROT SERPL-MCNC: 6.8 G/DL (ref 6.4–8.2)
PROT UR-MCNC: 19.6 MG/DL
PROT/CREAT UR-RTO: 0.3
RBC # BLD AUTO: 3.72 X10(6)UL
SODIUM SERPL-SCNC: 137 MMOL/L (ref 136–145)
URATE SERPL-MCNC: 2.6 MG/DL
WBC # BLD AUTO: 13.4 X10(3) UL (ref 4–11)

## 2024-05-29 PROCEDURE — 99215 OFFICE O/P EST HI 40 MIN: CPT

## 2024-05-29 PROCEDURE — 76819 FETAL BIOPHYS PROFIL W/O NST: CPT | Performed by: OBSTETRICS & GYNECOLOGY

## 2024-05-29 PROCEDURE — 59025 FETAL NON-STRESS TEST: CPT

## 2024-05-29 RX ORDER — ZOLPIDEM TARTRATE 5 MG/1
5 TABLET ORAL NIGHTLY PRN
Status: DISCONTINUED | OUTPATIENT
Start: 2024-05-29 | End: 2024-05-31

## 2024-05-29 RX ORDER — BETAMETHASONE SODIUM PHOSPHATE AND BETAMETHASONE ACETATE 3; 3 MG/ML; MG/ML
12 INJECTION, SUSPENSION INTRA-ARTICULAR; INTRALESIONAL; INTRAMUSCULAR; SOFT TISSUE EVERY 24 HOURS
Status: COMPLETED | OUTPATIENT
Start: 2024-05-29 | End: 2024-05-30

## 2024-05-29 RX ORDER — ACETAMINOPHEN 500 MG
1000 TABLET ORAL EVERY 6 HOURS PRN
Status: DISCONTINUED | OUTPATIENT
Start: 2024-05-29 | End: 2024-05-31

## 2024-05-29 RX ORDER — ACETAMINOPHEN 500 MG
500 TABLET ORAL EVERY 6 HOURS PRN
Status: DISCONTINUED | OUTPATIENT
Start: 2024-05-29 | End: 2024-05-31

## 2024-05-29 RX ORDER — NICOTINE POLACRILEX 4 MG
30 LOZENGE BUCCAL
Status: DISCONTINUED | OUTPATIENT
Start: 2024-05-29 | End: 2024-05-31

## 2024-05-29 RX ORDER — NICOTINE POLACRILEX 4 MG
15 LOZENGE BUCCAL
Status: DISCONTINUED | OUTPATIENT
Start: 2024-05-29 | End: 2024-05-31

## 2024-05-29 RX ORDER — MELATONIN
50 DAILY
Status: DISCONTINUED | OUTPATIENT
Start: 2024-05-29 | End: 2024-05-31

## 2024-05-29 RX ORDER — FOLIC ACID 1 MG/1
1 TABLET ORAL WEEKLY
Status: DISCONTINUED | OUTPATIENT
Start: 2024-05-30 | End: 2024-05-29

## 2024-05-29 RX ORDER — SODIUM CHLORIDE, SODIUM LACTATE, POTASSIUM CHLORIDE, CALCIUM CHLORIDE 600; 310; 30; 20 MG/100ML; MG/100ML; MG/100ML; MG/100ML
INJECTION, SOLUTION INTRAVENOUS CONTINUOUS
Status: DISCONTINUED | OUTPATIENT
Start: 2024-05-29 | End: 2024-05-31

## 2024-05-29 RX ORDER — CHOLECALCIFEROL (VITAMIN D3) 125 MCG
2000 CAPSULE ORAL 2 TIMES DAILY
Status: DISCONTINUED | OUTPATIENT
Start: 2024-05-29 | End: 2024-05-31

## 2024-05-29 RX ORDER — CALCIUM CARBONATE 500 MG/1
1000 TABLET, CHEWABLE ORAL
Status: DISCONTINUED | OUTPATIENT
Start: 2024-05-29 | End: 2024-05-31

## 2024-05-29 RX ORDER — DOCUSATE SODIUM 100 MG/1
100 CAPSULE, LIQUID FILLED ORAL 2 TIMES DAILY
Status: DISCONTINUED | OUTPATIENT
Start: 2024-05-29 | End: 2024-05-31

## 2024-05-29 RX ORDER — MAGNESIUM OXIDE 400 MG/1
400 TABLET ORAL DAILY
Status: DISCONTINUED | OUTPATIENT
Start: 2024-05-29 | End: 2024-05-31

## 2024-05-29 RX ORDER — MAGNESIUM OXIDE 400 MG (241.3 MG MAGNESIUM) TABLET
325 TABLET EVERY OTHER DAY
Status: DISCONTINUED | OUTPATIENT
Start: 2024-05-30 | End: 2024-05-31

## 2024-05-29 RX ORDER — DEXTROSE MONOHYDRATE 25 G/50ML
50 INJECTION, SOLUTION INTRAVENOUS
Status: DISCONTINUED | OUTPATIENT
Start: 2024-05-29 | End: 2024-05-31

## 2024-05-29 RX ORDER — HYDROXYZINE HYDROCHLORIDE 25 MG/1
TABLET, FILM COATED ORAL 3 TIMES DAILY PRN
Status: DISCONTINUED | OUTPATIENT
Start: 2024-05-29 | End: 2024-05-31

## 2024-05-29 RX ORDER — FOLIC ACID 1 MG/1
1 TABLET ORAL WEEKLY
Status: DISCONTINUED | OUTPATIENT
Start: 2024-05-29 | End: 2024-05-29

## 2024-05-29 RX ORDER — FERROUS SULFATE 325(65) MG
325 TABLET ORAL EVERY OTHER DAY
Status: DISCONTINUED | OUTPATIENT
Start: 2024-05-29 | End: 2024-05-29

## 2024-05-29 RX ORDER — LABETALOL 100 MG/1
100 TABLET, FILM COATED ORAL 2 TIMES DAILY
Status: DISCONTINUED | OUTPATIENT
Start: 2024-05-29 | End: 2024-05-31

## 2024-05-29 RX ORDER — CHOLECALCIFEROL (VITAMIN D3) 25 MCG
1 TABLET,CHEWABLE ORAL DAILY
Status: DISCONTINUED | OUTPATIENT
Start: 2024-05-29 | End: 2024-05-31

## 2024-05-29 RX ORDER — BETAMETHASONE SODIUM PHOSPHATE AND BETAMETHASONE ACETATE 3; 3 MG/ML; MG/ML
INJECTION, SUSPENSION INTRA-ARTICULAR; INTRALESIONAL; INTRAMUSCULAR; SOFT TISSUE
Status: DISPENSED
Start: 2024-05-29 | End: 2024-05-30

## 2024-05-29 RX ORDER — INSULIN DEGLUDEC 100 U/ML
66 INJECTION, SOLUTION SUBCUTANEOUS NIGHTLY
Status: DISCONTINUED | OUTPATIENT
Start: 2024-05-29 | End: 2024-05-30

## 2024-05-29 RX ORDER — PANTOPRAZOLE SODIUM 40 MG/1
40 TABLET, DELAYED RELEASE ORAL DAILY
Status: DISCONTINUED | OUTPATIENT
Start: 2024-05-29 | End: 2024-05-31

## 2024-05-29 RX ORDER — ASPIRIN 81 MG/1
162 TABLET ORAL DAILY
Status: DISCONTINUED | OUTPATIENT
Start: 2024-05-29 | End: 2024-05-31

## 2024-05-29 NOTE — PROGRESS NOTES
Pt is a  at 32 5/7 wk iup who is brought to room triage-4 for decel in on the oofice today. Pt called the office w/ c/o decreased fm. Pt was seen in the office and reports +fm but less than usual. Pt had a variable decel in the office per the NP. Pt was sent to L&D for further eval. Pt reports +fm and denies any LOF, VB or UC's. Pt reprots now feeling +fm. EFM tested and applied. POC dicussed and questions answered.

## 2024-05-29 NOTE — PROGRESS NOTES
OB problem visit 32w5d    She has felt 10 movements in 2 hours, but the baby's kick was not as strong as usual.     NST reassuring , baseline 130's moderate variability, has accels to 150's, variable to 90's for 60 sec. Sent to L&D for further monitoring    FRANCIS 7/19/24 by 6w3d US not c/w LMP   O+     GIRL  -NIPS neg, NT normal.    -carrier neg   -AFP low risk    -Flu 23-24 - declines   -2023 COVID-19 booster encouraged   -CBC, 3rd tri HIV and T Pallidum discussed - ordered, reminded  -TDAP - declined  -classes, pre-registration, pediatrician (Dr. Long), breast pump, car seat discussed   -H/o spinal headaches after epidural      Chronic Hypertension - started on labetalol at 17 wk   -Never formally diagnosed with CHTN, but multiple high readings prior to pregnancy (per EMR since at least 2018). See previous OB notes. Hypertensive even at 6 wk  -aspirin recommended - taking 81 mg tab x 2 daily   -Baseline EKG - 1/21 - ABNORMAL. Cannot rule out anterior infarct   -Baseline CMP & urine P/C normal. TSH 0.235 (L) - 12/24  -1/24/24 - /90 - 14w5d (NO CURRENT HA.) Patient bp at home 120's-130's/70-80's  -Recommend surveillance as if chronic HTN - reviewed potential for placental damage, fetal growth restriction, stillbirth. M Dr. Rosado in agreement    -BP monitoring at home   -2/7/24 /98 - 16w5d - Rx labetalol 100 mg BID   -L2 US normal   -Growth US q 4 wk in 3rd trim (with BPP at or beyond 32 wk) - ordered   Appt  4/26: EFW 67%ile, vertex, DEBBIE 18   Appt 5/24  -EFW 2146 g ( 4 lb 12 oz); 65%.                 -Weekly NSTs at 32 weeks; twice weekly NST's at 34 wk  Delivery at 38-39  weeks advised for suboptimally medication controlled diabetes   and cHTN    ABNORMAL EKG - 1/21/24  -1/21/24 - EKG report reads cannot rule out anterior infarct. Appears changed from 1/13/23 EKG.   -Cardiology 2/1/24 Dr. Martinez.                Dr. Martinez says 1/21/24 EKG was just low voltage in lead V3, no infarct  2/1/24 EKG -  NSR, right axis deviation, no ischemia, borderline  2/19/24 Holter monitor x 1 week - NSR (HR  bpm, mean 92). No Afib  3/13/24 TTE at 21+ wk LVEF 60-65%. No wall motion abnormality.   No significant valvular stenosis or regurgitation.   -appt  3/19 to discuss results of Holter & Echo with cardiologist.  - plan follow up appointment 2 months post partum        GDM on insulin per MFM  -Dx 16 wk -> insulin by 20 wk   -Diabetes education done 2/12/24. Follow up visit 3/4/24   -3/6/24 Lantus 24 units HS per MFM - now 60U at bedtime as of 5/7  -A1c about once per trimester - order placed.   -growth US & NSTs as above  Delivery at 38-39  weeks advised for suboptimally medication controlled diabetes & HTN        Obesity (pre preg BMI 36)   -limit weight gain 11-20 lb   -L2US, growth US, NSTs per above     H/o intractable migraines  -onset age 11.  20 or more migraines per month.   -only 4-5 hours of sleep on regular basis   -MRI C spine 2020 - Mild multilevel disc desiccation and bulging without significant loss of intervertebral disc space height. No significant spinal canal or neuroforaminal stenosis.   -MRI brain 2/12/21 - No acute intracranial abnormality.   -MRI c spine 8/21 - unremarkable. Done for left-sided facial and arm   numbness and radiculopathy.   -Past treatments: Botox, Nurtec, Aimovig, Ajovy, Amitriptyline, nortriptyline, propranolol, Depakote, Topamax, venlafaxine, Norco, Fioricet, multiple triptans, flexeril, and OTC analgesics. She has had facet and cervical epidurals as well as SPG block none of which provided much relief. Osteopathic manipulation therapy, PT, chiropractic, massage acupuncture, magnesium, CoQ10, vitamin B complex, elimination diet.   -Botox helped partially in the past   -Neurologist Dr. Robe Gutierrez - currently taking Naratriptan 2.5 mg PRN  -Took Qulipta x 1 week & seemed to be working - stopped when found out she is pregnant.   -per patient, when she notified Dr. Gutierrez  she is pregnant, he is no longer comfortable managing her migraine medication.   -Pain management 12/7/23 bilateral occipital neuralgia. Migraines originate from the occipital region, L>R. Bilateral occipital block, pending insurance approval and OBGyn approval.  Follow-up 2 to 3 weeks thereafter.   -1/10/24 - Bilateral greater occipital nerve block done. 100% improvement for 24 hr.   -1/24/24 - Sleep study ordered - not yet.    -2/2/24 Pain management f/u visit - consider repeat nerve blocks  -3/18 - 22w3d - migraines have improved significantly since around 19 wk (Note: started labetalol at 17 wk). Does not feel will need nerve block again at this time.   -her migraines have improved, takes Naratriptan only when needed      CHEYANNE, insomnia  -meds: none currently. H/o zoloft, clonazepam, venlafaxine   -therapist - not currently. Not very interested. Resources given   -3/18 - 22w3d - Rx hydroxyzine PRN      H/o PVCs  -Fhx Brugada syndrome - she tested negative   -3/31/2021 TTE - LVEF 55-60%. Unremarkable  -Holter monitor evaluation 4/2021 - was told her PVCs are minor (2%) and offered her medications if she wanted it - she declined - feeling fine.  -2/1/24 cardiologist Dr. Martinez. EKG - NSR, right axis deviation, no ischemia, borderline  -2/19/24 Holter monitor x 1 week - normal   -3/13/24 TTE at 21+ wk LVEF 60-65%. normal     Subclinical hyperthyroidism  -Low TSH with normal FT4 & FT3 noted on initial prenatal labs & in 2nd trimester. No treatment needed      Vitamin D deficiency  -1/6/24 Vit D 12.8 at 12 wk   -optimal dosing in pregnancy is controversial, but given starting out very low, Rx 50,000 international units weekly for 8 weeks, then switch to 4000 units daily over the counter in addition to prenatal vitamin      Bloating, constipation/difficulty evacuating stools, JAKI  -encouraged increased fiber & water  -possible pelvic floor dysfunction  - is seeing therapist      Pap neg, +HPV 18/45 (11/27/23)  -2/7 -  16w5d - colposcopy - cervical biopsy polypoid endocervical glandular tissue - Dr. Rock   -recommend pap & HPV co-testing at 1 year.

## 2024-05-29 NOTE — TELEPHONE ENCOUNTER
Called patient in regard to her [employer] message. See message. She states baby is moving but less than normal. Scheduled her for today. She does not have a ride right now so scheduled for 4 pm. Understanding verbalized.

## 2024-05-30 PROBLEM — O14.13 PREECLAMPSIA, SEVERE, THIRD TRIMESTER (HCC): Status: ACTIVE | Noted: 2024-05-30

## 2024-05-30 PROBLEM — O14.93 PRE-ECLAMPSIA IN THIRD TRIMESTER (HCC): Status: ACTIVE | Noted: 2024-05-30

## 2024-05-30 LAB
GLUCOSE BLD-MCNC: 101 MG/DL (ref 70–99)
GLUCOSE BLD-MCNC: 112 MG/DL (ref 70–99)
GLUCOSE BLD-MCNC: 129 MG/DL (ref 70–99)
GLUCOSE BLD-MCNC: 98 MG/DL (ref 70–99)
M PROTEIN 24H UR ELPH-MRATE: 363.3 MG/24 HR (ref ?–149.1)
SPECIMEN VOL UR: 2100 ML

## 2024-05-30 PROCEDURE — 99232 SBSQ HOSP IP/OBS MODERATE 35: CPT | Performed by: OBSTETRICS & GYNECOLOGY

## 2024-05-30 RX ORDER — INSULIN DEGLUDEC 100 U/ML
70 INJECTION, SOLUTION SUBCUTANEOUS NIGHTLY
Status: DISCONTINUED | OUTPATIENT
Start: 2024-05-30 | End: 2024-05-31

## 2024-05-30 NOTE — H&P
[facility]  Obstetrics and Gynecology  History & Physical    Tammy Ramirez Patient Status:  Inpatient    1990 MRN [id number]   Location [facility] 1SW-J Attending Meaghan Rock,    Hospital Day 0 PCP Landon Long MD     CC: Patient is here for blood pressure monitoring and observation     SUBJECTIVE:    Tammy Ramirez is a 33 year old  female at 32w5d Estimated Date of Delivery: 24 who is being admitted for  blood pressure monitoring and observation . Her current obstetrical history is significant for cHTN on labetalol 100 mg PO BID, GDMA2 on insulin, obesity, hx of migraines and subclinical hypothyroidism. Patient reports she was seen in the office today and reported decreased fetal movement. She had a noted variable on NST during her office evaluation and was sent to labor and delivery for further evaluation. While in triage, NST reactive and reassuring with BPP 8/8 but BP noted to be elevated. Labs reviewed. Protein to creatine ratio 0.3 which is elevated compare to baseline of 0.08. MFM consult obtained. Recommend inpatient BP monitoring with 24 hour urine protein collection and BMTZ course.     {negative UCx.    negative VB.   negative LOF.    positive Fetal movement.   negative Nausea, Vomiting, headache, vision changes and RUQ/Epigastric pain.       FRANCIS Confirmation  LMP: Patient's last menstrual period was 2023 (exact date).  FRANCIS: 2024, by Ultrasound       Obstetric History:   OB History    Para Term  AB Living   1             SAB IAB Ectopic Multiple Live Births                  # Outcome Date GA Lbr Lamberto/2nd Weight Sex Type Anes PTL Lv   1 Current               Obstetric Comments   Current - Obesity (pre preg BMI 36), chronic hypertension - labetalol started at 17 wk, GDM dx at 16 wk -> on insulin by 20 wk. Intractable migraines  -> improved after starting labetalol. Occipital neuralgia. CHEYANNE, insomnia, H/o PVCs. Subclinical  hyperthyroidism, vit D deficiency, pelvic floor dysfunction, Pap NILM +HPV18/45.      Past Medical History:   Past Medical History:    Anxiety    Arthritis    Not sure of the date but i have arthritus in my neck    Bilateral occipital neuralgia    Calculus of kidney    About a year and a half ago    Cervical facet syndrome    Cervical high risk HPV (human papillomavirus) test positive    Pap neg, +HPV 18/45 in 1st trimester.    Chronic hypertension    Since at least 1/2022 per chart review, though patient denies this diagnosis.    Chronic migraine without aura, with intractable migraine, so stated, with status migrainosus    chronic since age 11    DDD (degenerative disc disease), cervical    MRI C spine 2020 - Mild multilevel disc desiccation and bulging without significant loss of intervertebral disc space height. No significant spinal canal or neuroforaminal stenosis.    Dysmenorrhea    Essential hypertension    CHEYANNE (generalized anxiety disorder)    per [facility]    Gestational diabetes (HCC)    Gestational diabetes requiring insulin (HCC)    Dx 16 wk with GDM. On insulin by 20 wk    History of echocardiogram    3/31/2021 TTE - LVEF 55-60%. Unremarkable. Done for h/o palpitations, PVCs    History of echocardiogram    3/13/24 TTE at 21+ wk LVEF 60-65%. No wall motion abnormality.  No significant valvular stenosis or regurgitation.    Insomnia    Meningitis (HCC)    mid 20s. They thought maybe mumps?    Menometrorrhagia    Migraine    Obesity (BMI 30-39.9)    Palpitations    Holter monitor 2021 minor PVCs. 3/2021 Echo LVEF 55-60%, unremarkable.    Screening for genetic disease carrier status    [employer] carrier screen NEGATIVE    Spinal headache    H/o spinal puncture headaches after epidurals     Past Social History:   Past Surgical History:   Procedure Laterality Date    Colposcopy, cervix w/upper adjacent vagina; w/biopsy(s), cervix  02/07/2024    16w5d - cervical biopsy polypoid endocervical glandular  tissue - Dr. Rock     inject anesthetic agent greater occipital nerve  01/10/2024    Bilateral greater occipital nerve block - Depo-medrol & 1% lidocaine - Dr. Barnes. Done at approx 12 wk gestation    Lumbar puncture/spinal tap(bedside)      mid 20s. Dx meningitis    Other surgical history  07/2021 7/2021: cervical medial branch block C2-C5, seen in ED after procedure for dizziness, left sided facial numbness, worsening headache.    Tonsillectomy       Family History:   Family History   Problem Relation Age of Onset    Other (Brugada syndrome) Father         defibrillator    Heart Disorder Father         Atrial flutter    Migraines Father         very difficult to treat    Other (Vasculitis) Father     Genetic Disease Father         Brugada Disease    Dementia Paternal Grandfather     Asthma Paternal Grandmother      Social History:   Social History     Tobacco Use    Smoking status: Never    Smokeless tobacco: Never   Substance Use Topics    Alcohol use: Never       Home Meds:   Medications Prior to Admission   Medication Sig Dispense Refill Last Dose    Ferrous Sulfate 325 (65 Fe) MG Oral Tab Take every other day PO at least 4 hours separate from other iron supplement (e.g. prenatal vitamin) 90 tablet 3     insulin glargine (LANTUS SOLOSTAR) 100 UNIT/ML Subcutaneous Solution Pen-injector Inject 66 Units into the skin at bedtime. Split dose into 2 separate injections of 33 units each. Use a new needle with each injection       pantoprazole (PROTONIX) 40 MG Oral Tab EC Take 1 tablet (40 mg total) by mouth daily. 30 tablet 11     LABETALOL 100 MG Oral Tab TAKE ONE TABLET BY MOUTH TWO TIMES A DAY 60 tablet 0     aspirin 81 MG Oral Tab EC Take 2 tablets (162 mg total) by mouth daily.       Cholecalciferol (VITAMIN D) 50 MCG (2000 UT) Oral Cap Take by mouth 2 (two) times a day.       hydrOXYzine 25 MG Oral Tab Take 1-2 tablets (25-50 mg total) by mouth 3 (three) times daily as needed for Anxiety (for sleep  trouble). 30 tablet 11     Continuous Blood Gluc Sensor (DEXCOM G7 SENSOR) Does not apply Misc 1 each Every 10 days. 3 each 11     Blood Glucose Monitoring Suppl (ONETOUCH VERIO FLEX SYSTEM) w/Device Does not apply Kit 1 each As Directed. 1 kit 0     OneTouch Delica Lancets 30G Does not apply Misc 1 each 6 (six) times daily. 200 each 3     Insulin Pen Needle 31G X 5 MM Does not apply Misc Use a new needle with each injection 100 each 2     Magnesium 200 MG Oral Tab Take 2 tablets (400 mg total) by mouth daily.       pyridoxine 50 MG Oral Tab Take 1 tablet (50 mg total) by mouth daily.       OneTouch Delica Lancets 30G Does not apply Misc 1 each 4 (four) times daily. 200 each 3     Lancets Does not apply Misc Use to test BG 4x/day. 400 each 2     Glucose Blood (BLOOD GLUCOSE TEST) In Vitro Strip Use to test BG 4x/day 400 strip 2     Blood Glucose Monitoring Suppl Does not apply Kit Use to test BG. 1 kit 0     Naratriptan HCl 2.5 MG Oral Tab Take 1 tablet (2.5 mg total) by mouth as needed for Migraine. (Patient not taking: Reported on 2024) 21 tablet 3     [] Cholecalciferol (VITAMIN D3) 1.25 MG (65431 UT) Oral Cap Take 1 tablet by mouth once a week for 8 doses. Then switch to vitamin D3 4,000 IU daily in addition to prenatal vitamin. 8 capsule 0     Prenatal MV-Min-Fe Fum-FA-DHA (PRENATAL 1 OR) Take by mouth.        Allergies:   Allergies   Allergen Reactions    Droperidol ANXIETY, RESTLESSNESS and Tightness in Chest       OBJECTIVE:    Temp:  [99 °F (37.2 °C)] 99 °F (37.2 °C)  Pulse:  [] 129  Resp:  [16] 16  BP: (130-153)/(82-98) 147/92  Body mass index is 40.51 kg/m².    General: AAO. NAD.   Lungs: no tachypnea, retractions or cyanosis  CV: normal peripheral perfusion   Abdomen: FHT present, gravid   Extremities: negative edema bilaterally, negative calf tenderness bilaterally, Robson's sign negative bilaterally     FHT: moderate variability/130 BPM / Positive accelerations/Negative  decelerations   TOCO: irregular     SVE: deferred      Prenatal Labs Brief Review   Blood Type:   Lab Results   Component Value Date    ABO O 2023    RH Positive 2023     GBS:  unknown       Inpatient labs:  Lab Results   Component Value Date    WBC 13.4 2024    HGB 10.7 2024    HCT 32.7 2024    .0 2024    CREATSERUM 0.39 2024    BUN 6 2024     2024    K 3.6 2024     2024    CO2 20.0 2024    GLU 79 2024    CA 9.0 2024    ALB 2.6 2024    ALKPHO 110 2024    BILT 0.3 2024    TP 6.8 2024    AST 16 2024    ALT 24 2024       ASSESSMENT/ PLAN:    Tammy Ramirez is a 33 year old  female at 32w5d Estimated Date of Delivery: 24 who is being admitted for observation.    Patient Active Problem List    Diagnosis    Pregnancy (HCC)    Stress incontinence during pregnancy ([facility])     PFPT ordered 2nd trimester.       Heartburn during pregnancy in second trimester ([facility])    Insomnia    Subclinical hyperthyroidism    Insulin controlled gestational diabetes mellitus (GDM) in second trimester (HCC)     Diagnosed at 16 weeks      Pelvic floor dysfunction    Constipation during pregnancy in second trimester ([facility])    Abnormal EKG     24 EKG at 14w2d - for baseline due to suspicion for chronic HTN  Normal sinus rhythm   Cannot rule out Anterior infarct , age undetermined   Abnormal ECG   When compared with ECG of 2023 17:17,   Premature ventricular complexes are no longer Present   Confirmed by CICI MONTOYA, NADEEM ([id number]) on 2024 4:57:49 PM     Cardiology consultation recommended.       Vitamin D deficiency     #Vitamin D deficiency  -noted at 12 wk   -optimal dosing in pregnancy is controversial, but given starting out very low, would recommend Rx 50,000 international units weekly for 8 weeks, then switch to 4000 units daily over the counter in addition to prenatal  vitamin          Low TSH level     #Low TSH with normal FT4 & FT3  -this is common in early pregnancy from high HCG level  -we don't treat this pattern of thyroid labs  -repeat TSH in 2nd trimester       Headache in pregnancy, second trimester ([facility])    Obesity affecting pregnancy in second trimester ([facility])    Anxiety disorder affecting pregnancy, antepartum ([facility])    Chronic hypertension affecting pregnancy ([facility])     1/24/2024 - /90 at 14w5d with NO HEADACHE. Home /103 today. Suspect underlying CHTN  2/7/24 Labetalol started at 16w5d    [ ] growth US q 4 wk starting at 28 wk (with BPP at or beyond 32 wk)  [ ] NST weekly at 32 wk     Sleep study ordered.       Bilateral occipital neuralgia    Nausea and vomiting of pregnancy, antepartum ([facility])    Influenza vaccination declined by patient    Cervical high risk HPV (human papillomavirus) test positive    Abnormal cervical Papanicolaou smear affecting pregnancy in second trimester ([facility])    Migraine    Cervical facet syndrome    Chronic migraine without aura, with intractable migraine, so stated, with status migrainosus    DDD (degenerative disc disease), cervical       cHTN, rule out superimposed pre eclampsia   - w/ elevated BP and elevated protein to creatine ratio  - recommend inpatient BP monitoring  - continue Labetalol 100 mg PO BID  - 24 urine protein collection   - MFM consult   - continue to monitor for signs and symptoms of pre eclampsia with severe features  - BMTZ x 2 doses  - FHT monitoring x 1 hr BID   - GBS ordered     GDMA2  - continue Lantus 66 units nightly  - MFM consult and appreciate further recommendations   - accuchecks  - diabetic diet           Risks, benefits, alternatives and possible complications have been discussed in detail with the patient.  Pre-admission, admission, and post admission procedures and expectations were discussed in detail.  All questions answered, all appropriate consents will be signeSVD anticipated.    Anjali  MD [facility]   [facility] - OBGYN      Note to patient and family   The 21st Century Cures Act makes medical notes available to patients in the interest of transparency.  However, please be advised that this is a medical document.  It is intended as [id number] communication.  It is written and medical language may contain abbreviations or verbiage that are technical and unfamiliar.  It may appear blunt or direct.  Medical documents are intended to carry relevant information, facts as evident, and the clinical opinion of the practitioner.

## 2024-05-30 NOTE — CONSULTS
[facility]   part of [facility]    Maternal-Fetal Medicine Inpatient Consultation    Date of Admission:  2024  Date of Consult:  2024    Reason for Consult:   Hypertension with superimposed preeclampsia    History of Present Illness:  Tammy Ramirez is a a(n) 33 year old female  with an IUP at 32w6d and an FRANCIS of Estimated Date of Delivery: 24 who  presents with suspicion for superimposed preeclampsia.  Patient's antepartum history is notable for GDM A2, obesity and chronic hypertension which is controlled on labetalol 100 mg twice daily.    She reported to OB triage for evaluation secondary to variable decelerations noted on office NST.  The fetal status was reassuring with a reactive NST without decelerations and a BPP of 8/8.  However the patient's blood pressures were notably elevated.  They were fortunately not in the severe range.  Her protein/creatinine ratio was elevated to 0.3; this was increased from her baseline of 0.08.    Review of History:      OB History:    OB History    Para Term  AB Living   1             SAB IAB Ectopic Multiple Live Births                  # Outcome Date GA Lbr Lamberto/2nd Weight Sex Type Anes PTL Lv   1 Current               Obstetric Comments   Current - Obesity (pre preg BMI 36), chronic hypertension - labetalol started at 17 wk, GDM dx at 16 wk -> on insulin by 20 wk. Intractable migraines  -> improved after starting labetalol. Occipital neuralgia. CHEYANNE, insomnia, H/o PVCs. Subclinical hyperthyroidism, vit D deficiency, pelvic floor dysfunction, Pap NILM +HPV18/45.        Other Relevant History:  Past Medical History:    Anxiety    Arthritis    Not sure of the date but i have arthritus in my neck    Bilateral occipital neuralgia    Calculus of kidney    About a year and a half ago    Cervical facet syndrome    Cervical high risk HPV (human papillomavirus) test positive    Pap neg, +HPV 18/45 in 1st trimester.    Chronic hypertension     Since at least 1/2022 per chart review, though patient denies this diagnosis.    Chronic migraine without aura, with intractable migraine, so stated, with status migrainosus    chronic since age 11    DDD (degenerative disc disease), cervical    MRI C spine 2020 - Mild multilevel disc desiccation and bulging without significant loss of intervertebral disc space height. No significant spinal canal or neuroforaminal stenosis.    Dysmenorrhea    Essential hypertension    CHEYANNE (generalized anxiety disorder)    per Care[facility]where    Gestational diabetes (HCC)    Gestational diabetes requiring insulin (HCC)    Dx 16 wk with GDM. On insulin by 20 wk    History of echocardiogram    3/31/2021 TTE - LVEF 55-60%. Unremarkable. Done for h/o palpitations, PVCs    History of echocardiogram    3/13/24 TTE at 21+ wk LVEF 60-65%. No wall motion abnormality.  No significant valvular stenosis or regurgitation.    Insomnia    Meningitis (HCC)    mid 20s. They thought maybe mumps?    Menometrorrhagia    Migraine    Obesity (BMI 30-39.9)    Palpitations    Holter monitor 2021 minor PVCs. 3/2021 Echo LVEF 55-60%, unremarkable.    Screening for genetic disease carrier status    [employer] carrier screen NEGATIVE    Spinal headache    H/o spinal puncture headaches after epidurals     Past Surgical History:   Procedure Laterality Date    Colposcopy, cervix w/upper adjacent vagina; w/biopsy(s), cervix  02/07/2024    16w5d - cervical biopsy polypoid endocervical glandular tissue - Dr. Rock     inject anesthetic agent greater occipital nerve  01/10/2024    Bilateral greater occipital nerve block - Depo-medrol & 1% lidocaine - Dr. Barnes. Done at approx 12 wk gestation    Lumbar puncture/spinal tap(bedside)      mid 20s. Dx meningitis    Other surgical history  07/2021 7/2021: cervical medial branch block C2-C5, seen in ED after procedure for dizziness, left sided facial numbness, worsening headache.    Tonsillectomy       Family History    Problem Relation Age of Onset    Other (Brugada syndrome) Father         defibrillator    Heart Disorder Father         Atrial flutter    Migraines Father         very difficult to treat    Other (Vasculitis) Father     Genetic Disease Father         Brugada Disease    Dementia Paternal Grandfather     Asthma Paternal Grandmother       reports that she has never smoked. She has never used smokeless tobacco. She reports that she does not currently use drugs after having used the following drugs: Cannabis. She reports that she does not drink alcohol.    Allergies:  Allergies   Allergen Reactions    Droperidol ANXIETY, RESTLESSNESS and Tightness in Chest       Medications:    Current Facility-Administered Medications:     aspirin DR tab 162 mg, 162 mg, Oral, Daily    cholecalciferol (Vitamin D3) tab 2,000 Units, 2,000 Units, Oral, BID    hydrOXYzine (Atarax) tab 25-50 mg, 25-50 mg, Oral, TID PRN    insulin degludec 100 units/mL flextouch 66 Units, 66 Units, Subcutaneous, Nightly    labetalol (Normodyne) tab 100 mg, 100 mg, Oral, BID    pantoprazole (Protonix) DR tab 40 mg, 40 mg, Oral, Daily    magnesium oxide (Mag-Ox) tab 400 mg, 400 mg, Oral, Daily    pyridoxine (Vitamin B6) tab 50 mg, 50 mg, Oral, Daily    lactated ringers infusion, , Intravenous, Continuous    acetaminophen (Tylenol Extra Strength) tab 500 mg, 500 mg, Oral, Q6H PRN **OR** acetaminophen (Tylenol Extra Strength) tab 1,000 mg, 1,000 mg, Oral, Q6H PRN    zolpidem (Ambien) tab 5 mg, 5 mg, Oral, Nightly PRN    docusate sodium (Colace) cap 100 mg, 100 mg, Oral, BID    calcium carbonate (Tums) chewable tab 1,000 mg, 1,000 mg, Oral, Daily PRN    prenatal vitamin with DHA (PNV with DHA) cap 1 capsule, 1 capsule, Oral, Daily    betamethasone sodium phosphate & acetate (Celestone) 6 (3-3) MG/ML injection 12 mg, 12 mg, Intramuscular, Q24H    glucose (Dex4) 15 GM/59ML oral liquid 15 g, 15 g, Oral, Q15 Min PRN **OR** glucose (Glutose) 40% oral gel 15 g, 15 g,  Oral, Q15 Min PRN **OR** glucose-vitamin C (Dex-4) chewable tab 4 tablet, 4 tablet, Oral, Q15 Min PRN **OR** dextrose 50% injection 50 mL, 50 mL, Intravenous, Q15 Min PRN **OR** glucose (Dex4) 15 GM/59ML oral liquid 30 g, 30 g, Oral, Q15 Min PRN **OR** glucose (Glutose) 40% oral gel 30 g, 30 g, Oral, Q15 Min PRN **OR** glucose-vitamin C (Dex-4) chewable tab 8 tablet, 8 tablet, Oral, Q15 Min PRN    ferrous sulfate DR tab 325 mg, 325 mg, Oral, QOD    Physical Exam:  Temp:  [98.2 °F (36.8 °C)-99 °F (37.2 °C)] 98.2 °F (36.8 °C)  Pulse:  [] 96  Resp:  [16] 16  BP: (130-153)/(82-98) 144/90    Gen: No acute distress, alert and oriented x3  Abd: Gravid abdomen, soft, nontender, non-distended  Cervix: deffered  Ext:no edema    Laboratory Data:  Lab Results   Component Value Date    WBC 13.4 05/29/2024    HGB 10.7 05/29/2024    HCT 32.7 05/29/2024    .0 05/29/2024    CREATSERUM 0.39 05/29/2024    BUN 6 05/29/2024     05/29/2024    K 3.6 05/29/2024     05/29/2024    CO2 20.0 05/29/2024    GLU 79 05/29/2024    CA 9.0 05/29/2024    ALB 2.6 05/29/2024    ALKPHO 110 05/29/2024    BILT 0.3 05/29/2024    TP 6.8 05/29/2024    AST 16 05/29/2024    ALT 24 05/29/2024       FETAL STATUS:  FHRT: 115 baseline, moderate variability, Reactive, decelerations: none  Uterine Contractions:  irregular    NARRATIVE:   PREECLAMPSIA    Discussion  Preeclampsia refers to the new onset of hypertension and proteinuria after 20 weeks of gestation in a previously normotensive woman.  Preeclampsia occurs in approximately 3 to 14 percent of all pregnancies worldwide, and about 5 to 8 percent in the [place].  The pathogenesis of preeclampsia is incompletely understood, but the disorder is clearly initiated by the presence of the trophoblast, and impaired placental angiogenesis plays an important role.   The clinical manifestations of preeclampsia can appear anytime from the second trimester to the first few weeks postpartum.  The majority of cases of preeclampsia arise in low-risk nulliparous women without medical complications or identifiable risk factors.   Women with early, severe preeclampsia are at greatest risk of recurrence (25 to 65 percent), the incidence is much lower (5 to 7 percent) in women who had mild preeclampsia during the first pregnancy.               The ability to predict preeclampsia is currently of limited benefit because neither the development of the disorder nor its progression from mild to severe disease can be prevented in most patients, and there is no cure except delivery. Nevertheless, the accurate identification of women at risk, early diagnosis, and prompt and appropriate management will lead to an improvement in maternal, and possibly , outcome.  Risk factors for preeclampsia include: Nulliparity,  Preeclampsia in a previous pregnancy,  Age >40 years or <18 years,  Family history of pregnancy-induced hypertension, Chronic hypertension,  Chronic renal disease,  Antiphospholipid antibody syndrome or inherited thrombophilia, Vascular or connective tissue disease, Diabetes mellitus (pregestational and gestational),  Multifetal gestation, High body mass index,  Male partner whose previous partner had preeclampsia, Hydrops fetalis, and Unexplained fetal growth restriction.                 The weight of evidence indicates that inherited thrombophilias (such as Factor V Leiden mutation, prothrombin gene mutation, protein C or S deficiency, antithrombin III deficiency) are not associated with preeclampsia. Therefore, screening pregnant women for inherited thrombophilias is not useful for predicting those at high risk of developing preeclampsia.  Antiphospholipid antibody syndrome is associated with development of severe early preeclampsia and screening is recommended.  Measurement of angiogenic factors (VEGF, PlGF, sFlt-1, Shahram) in blood or urine is the most promising approach for predicting  preeclampsia; however, these tests are investigational and are not available for clinical use at present.  Data from multiple observational studies suggest that preeclampsia predicts remote cardiovascular events (eg, hypertension, ischemic heart disease, stroke). Review of all of a woman's pregnancies is necessary to define her long-term risk accurately. Those with early onset severe preeclampsia, recurrent preeclampsia, gestational hypertension, or preeclampsia with onset as a multipara appear to be at highest risk of cardiovascular disease later in life, including during the premenopausal period. These women may have unrecognized latent hypertension, or other genetic or environmental factors predisposing them to hypertension during and subsequent to pregnancy. In contrast, preeclampsia/eclampsia occurring late in gestation in primigravid women and followed by a normotensive pregnancy does not appear to be associated with increased remote cardiovascular risk.       A 2015 meta-analysis of individual patient data from over 75,000 women with preeclampsia who became pregnant again found that 16 percent developed recurrent preeclampsia and 20 percent developed hypertension alone in a subsequent pregnancy.  The recurrence risk varies with the severity and time of onset of the initial episode.Women with early-onset, severe preeclampsia are at greatest risk of recurrence (as high as 25 to 65 percent). The risk of preeclampsia in a second pregnancy is much lower (5 to 7 percent) for women who had preeclampsia without severe features in their first pregnancy and less than 1 percent in women who had a normotensive first pregnancy (does not apply to abortions). These relationships were illustrated by a series of 125 women with severe second-trimester preeclampsia followed for five years: 65 percent developed recurrent preeclampsia and 35 percent were normotensive in their subsequent pregnancy. Of the preeclamptic group,  approximately one-third developed the disease at ?27 weeks, one-third at 28 to 36 weeks, and one-third at ?37 weeks. Thus, 21 percent of subsequent pregnancies were complicated by severe preeclampsia in the second trimester.  Recurrent preeclampsia is more likely following a preeclamptic dillon pregnancy than a preeclamptic twin pregnancy.The recurrence risk in women with HELLP syndrome (who may develop either HELLP or preeclampsia in a subsequent pregnancy) is discussed separately.    Many studies have been performed to try to find a way to prevent preeclampsia.  These include the use of fish oil, vitamin C, Vitamin E, calcium and aspirin.  A few studies have shown benefit with aspirin but others have not.  Aspirin therapy is not recommended for women at low-risk for development of preeclampsia. We suggest use of low dose aspirin in women at moderate to high risk of developing preeclampsia, but no therapy is a reasonable alternative.   No drug prevents progression to more severe disease; use of any drug in this setting is not recommended.         IMPRESSION:   IUP at 32w6d  Class II obesity complicating pregnancy  Chronic migraines  Hypertension, chronic with suspected superimposed preeclampsia without severe features  GDM A2    RECOMMENDATIONS:   Complete betamethasone administration  Complete 24-hour urine collection  Continue Lantus -will increase dose in light of steroids  Will add sliding scale insulin for postprandial glucose elevations  If blood pressures remain out of severe range may consider outpatient management with enhanced fetal surveillance as noted below:  Twice-weekly antepartum monitoring consisting of weekly NSTs and weekly BPP's  Continue monthly growth ultrasounds  Serum laboratory studies every 1-2 weeks to evaluate for progression to severe features of preeclampsia  Continue twice daily home blood pressure measurements  weekly review of blood pressure log  Notify physician if systolic BP's  > 160's or diastolic BP's >105  Delivery at 37 weeks gestation or sooner should severe features of preeclampsia or gestational hypertension develop  Obviously an immediate delivery should a nonreassuring fetal status be noted    Tyler Marroquin M.D.  5/30/2024  7:58 AM    Thank you for allowing me to participate in the care of your patient.  Please do not hesitate to contact me if additional questions or concerns arise.      Total Time spent with patient and coordinating care:  60 minutes

## 2024-05-30 NOTE — PLAN OF CARE
Problem: ANTEPARTUM/LABOR and DELIVERY  Goal: Maintain pregnancy as long as maternal and/or fetal condition is stable  Description: INTERVENTIONS:  - Maternal surveillance  - Fetal surveillance  - Monitor uterine activity  - Medications as ordered  - Bedrest  Outcome: Progressing  Goal: Anxiety is at manageable level  Description: INTERVENTIONS:  - [place] patient to unit/surroundings  - Establish a trusting relationship with patient  - Discuss possible complications or alterations in birth plan  - Explain treatment plan  - Explain testing/procedures prior to initiation  - Encourage participation in care  - Encourage verbalization of concerns/fears  - Identify coping mechanisms  - Administer/offer alternative therapies (Aroma therapy, distraction, guided imagery, massage, music therapy, therapeutic touch)  - Manage patient's environment (Avoid overstimulation of patient)  Outcome: Progressing  Goal: Demonstrates ability to cope with hospitalization/illness  Description: INTERVENTIONS:  - Encourage verbalization of feelings/concerns/expectations  - Provide quiet environment  - Assist patient to identify own strengths and abilities  - Encourage patient to set small goals for self  - Encourage participation in diversional activity  - Reinforce positive adaptation of new coping behaviors  - Include patient/family/caregiver in decisions  Outcome: Progressing

## 2024-05-30 NOTE — PLAN OF CARE
Problem: ANTEPARTUM/LABOR and DELIVERY  Goal: Maintain pregnancy as long as maternal and/or fetal condition is stable  Description: INTERVENTIONS:  - Maternal surveillance  - Fetal surveillance  - Monitor uterine activity  - Medications as ordered  - Bedrest  Outcome: Progressing  Goal: Anxiety is at manageable level  Description: INTERVENTIONS:  - [place] patient to unit/surroundings  - Establish a trusting relationship with patient  - Discuss possible complications or alterations in birth plan  - Explain treatment plan  - Explain testing/procedures prior to initiation  - Encourage participation in care  - Encourage verbalization of concerns/fears  - Identify coping mechanisms  - Administer/offer alternative therapies (Aroma therapy, distraction, guided imagery, massage, music therapy, therapeutic touch)  - Manage patient's environment (Avoid overstimulation of patient)  Outcome: Progressing  Goal: Demonstrates ability to cope with hospitalization/illness  Description: INTERVENTIONS:  - Encourage verbalization of feelings/concerns/expectations  - Provide quiet environment  - Assist patient to identify own strengths and abilities  - Encourage patient to set small goals for self  - Encourage participation in diversional activity  - Reinforce positive adaptation of new coping behaviors  - Include patient/family/caregiver in decisions  Outcome: Progressing     Problem: Patient/Family Goals  Goal: Patient/Family Long Term Goal  Description: Patient's Long Term Goal: Maintain pregnancy as long as maternal and fetal conditions are stable    Interventions:  -   - See additional Care Plan goals for specific interventions  Outcome: Progressing  Goal: Patient/Family Short Term Goal  Description: Patient's Short Term Goal: Maintain BP within normal limits    Interventions:     - See additional Care Plan goals for specific interventions  Outcome: Progressing

## 2024-05-30 NOTE — PROGRESS NOTES
[facility]  OB/GYN: Antepartum Progress Note     SUBJECTIVE:  Pt is a 33 year old  female at 32w6d who was admitted on 2024 for chronic hypertension with suspected superimposed preeclampsia without severe features. Denies N, V, Ha, vision changes and RUQ/Epigastric pain.      Fetal Movement: normal. Vaginal Bleeding: denies. Contractions: denies. Leakage of Fluid: denies    OBJECTIVE:  Vital signs in last 24 hours:  Temp:  [97.9 °F (36.6 °C)-99 °F (37.2 °C)] 97.9 °F (36.6 °C)  Pulse:  [] 95  Resp:  [16-18] 16  BP: (130-153)/(76-98) 145/80  Temps:   Temp Readings from Last 3 Encounters:   24 97.9 °F (36.6 °C) (Oral)   23 98.7 °F (37.1 °C)   23 98.3 °F (36.8 °C) (Oral)     Maximum Temperature: Temp (24hrs), Av.3 °F (36.8 °C), Min:97.9 °F (36.6 °C), Max:99 °F (37.2 °C)     BPs:  BP Readings from Last 3 Encounters:   24 145/80   24 130/82   24 120/85     Weights:  Wt Readings from Last 3 Encounters:   24 236 lb (107 kg)   24 236 lb 12.8 oz (107.4 kg)   24 235 lb (106.6 kg)       Intake/Output:  Totals for last 24 hours:   No intake or output data in the 24 hours ending 24 1427    General: AAO. NAD  CV: normal peripheral perfusion   Lungs: non-labored breathing, no tachypnea   Abdomen: soft, non-tender, gravid   Uterus: nontender  Extremities: no calf tenderness bilaterally    Uterine Contraction: none   NST:baseline 130/ moderate kahlil/ + accels/ no decels    Labs:  Lab Results   Component Value Date    WBC 13.4 2024    HGB 10.7 2024    HCT 32.7 2024    .0 2024    CREATSERUM 0.39 2024    BUN 6 2024     2024    K 3.6 2024     2024    CO2 20.0 2024    GLU 79 2024    CA 9.0 2024    ALB 2.6 2024    ALKPHO 110 2024    BILT 0.3 2024    TP 6.8 2024    AST 16 2024    ALT 24 2024   '    ASSESSMENT/PLAN:  Pt is a 33  year old  female at 32w6d who was admitted on 2024 for chronic hypertension with suspected superimposed preeclampsia without severe features.   Hospital Day 0    Active Problems:  Patient Active Problem List   Diagnosis    Cervical facet syndrome    Chronic migraine without aura, with intractable migraine, so stated, with status migrainosus    DDD (degenerative disc disease), cervical    Migraine    Headache in pregnancy, second trimester ([facility])    Obesity affecting pregnancy in second trimester ([facility])    Anxiety disorder affecting pregnancy, antepartum ([facility])    Chronic hypertension affecting pregnancy ([facility])    Bilateral occipital neuralgia    Nausea and vomiting of pregnancy, antepartum ([facility])    Influenza vaccination declined by patient    Cervical high risk HPV (human papillomavirus) test positive    Abnormal cervical Papanicolaou smear affecting pregnancy in second trimester ([facility])    Vitamin D deficiency    Low TSH level    Abnormal EKG    Pelvic floor dysfunction    Constipation during pregnancy in second trimester ([facility])    Insulin controlled gestational diabetes mellitus (GDM) in second trimester ([facility])    Subclinical hyperthyroidism    Stress incontinence during pregnancy ([facility])    Heartburn during pregnancy in second trimester ([facility])    Insomnia    Pregnancy ([facility])    Pre-eclampsia in third trimester ([facility])       Chronic hypertension with suspected superimposed preeclampsia without severe features  -Continue blood pressure monitoring  -Continue 24-hour urine protein collection  -Plan to complete second dose of betamethasone tonight  -Continue to monitor for signs and symptoms of preeclampsia with severe features  -Precautions provided  -Continue inpatient monitoring for now  -M consult, appreciate further recommendations  Complete betamethasone administration  Complete 24-hour urine collection  Continue Lantus -will increase dose in light of steroids  Will add sliding scale insulin for postprandial  glucose elevations  If blood pressures remain out of severe range may consider outpatient management with enhanced fetal surveillance as noted below:  Twice-weekly antepartum monitoring consisting of weekly NSTs and weekly BPP's  Continue monthly growth ultrasounds  Serum laboratory studies every 1-2 weeks to evaluate for progression to severe features of preeclampsia  Continue twice daily home blood pressure measurements  weekly review of blood pressure log  Notify physician if systolic BP's > 160's or diastolic BP's >105  Delivery at 37 weeks gestation or sooner should severe features of preeclampsia or gestational hypertension develop  Obviously an immediate delivery should a nonreassuring fetal status be noted  GDMA2  -Continue insulin plus insulin sliding scale per MFM, refer to above  -Continue Accu-Chek monitoring    Total patient time was 45 minutes in evaluation, consultation, and coordination of care.  Greater than 50% of this time was spent in face to face discussion with the patient. The patient had many questions and concerns that were addressed.     Anjali Abbott MD   [facility] - OBGYN        Note to patient and family   The 21st Century Cures Act makes medical notes available to patients in the interest of transparency.  However, please be advised that this is a medical document.  It is intended as [id number] communication.  It is written and medical language may contain abbreviations or verbiage that are technical and unfamiliar.  It may appear blunt or direct.  Medical documents are intended to carry relevant information, facts as evident, and the clinical opinion of the practitioner.

## 2024-05-30 NOTE — PAYOR COMM NOTE
-------------- OBSERVATION STATUS        CONTINUED STAY REVIEW    Payor: [employer]  Subscriber #:  [id number]  Authorization Number: [id number]    Admit date: N/A  Admit time: N/A    REVIEW DOCUMENTATION:      Pt is a 33 year old  female at 32w6d who was admitted on 2024 for chronic hypertension with suspected superimposed preeclampsia without severe features. Denies N, V, Ha, vision changes and RUQ/Epigastric pain.      Fetal Movement: normal. Vaginal Bleeding: denies. Contractions: denies. Leakage of Fluid: denies    General: AAO. NAD  CV: normal peripheral perfusion   Lungs: non-labored breathing, no tachypnea   Abdomen: soft, non-tender, gravid   Uterus: nontender  Extremities: no calf tenderness bilaterally     Uterine Contraction: none   NST:baseline 130/ moderate kahlil/ + accels/ no decels     Labs:        Lab Results   Component Value Date     WBC 13.4 2024     HGB 10.7 2024     HCT 32.7 2024     .0 2024     CREATSERUM 0.39 2024     BUN 6 2024      2024     K 3.6 2024      2024     CO2 20.0 2024     GLU 79 2024     CA 9.0 2024     ALB 2.6 2024     ALKPHO 110 2024     BILT 0.3 2024     TP 6.8 2024     AST 16 2024     ALT 24 2024   '     ASSESSMENT/PLAN:  Pt is a 33 year old  female at 32w6d who was admitted on 2024 for chronic hypertension with suspected superimposed preeclampsia without severe features.      Chronic hypertension with suspected superimposed preeclampsia without severe features  -Continue blood pressure monitoring  -Continue 24-hour urine protein collection  -Plan to complete second dose of betamethasone tonight  -Continue to monitor for signs and symptoms of preeclampsia with severe features  -Precautions provided  -Continue inpatient monitoring for now  -MFM consult, appreciate further recommendations  Complete  betamethasone administration  Complete 24-hour urine collection  Continue Lantus -will increase dose in light of steroids  Will add sliding scale insulin for postprandial glucose elevations  If blood pressures remain out of severe range may consider outpatient management with enhanced fetal surveillance as noted below:  Twice-weekly antepartum monitoring consisting of weekly NSTs and weekly BPP's  Continue monthly growth ultrasounds  Serum laboratory studies every 1-2 weeks to evaluate for progression to severe features of preeclampsia  Continue twice daily home blood pressure measurements  weekly review of blood pressure log  Notify physician if systolic BP's > 160's or diastolic BP's >105  Delivery at 37 weeks gestation or sooner should severe features of preeclampsia or gestational hypertension develop  Obviously an immediate delivery should a nonreassuring fetal status be noted  GDMA2  -Continue insulin plus insulin sliding scale per MFM, refer to above  -Continue Accu-Chek monitoring       MEDICATIONS ADMINISTERED IN LAST 1 DAY:  aspirin DR tab 162 mg       Date Action Dose Route User    5/30/2024 0851 Given 162 mg Oral Jennifer Burgess RN          betamethasone sodium phosphate & acetate (Celestone) 6 (3-3) MG/ML injection 12 mg       Date Action Dose Route User    5/29/2024 2041 Given 12 mg Intramuscular (Left Vastus Lateralis) Lili Giordano RN          docusate sodium (Colace) cap 100 mg       Date Action Dose Route User    5/30/2024 0850 Given 100 mg Oral Jennifer Burgess RN    5/29/2024 2227 Given 100 mg Oral Lili Giordano RN          ferrous sulfate DR tab 325 mg       Date Action Dose Route User    5/30/2024 0851 Given 325 mg Oral Jennifer Burgess RN          insulin degludec 100 units/mL flextouch 66 Units       Date Action Dose Route User    5/29/2024 2227 Given 66 Units Subcutaneous (Left Upper Arm) Lili Giordano RN          labetalol (Normodyne) tab 100 mg       Date Action Dose Route User    5/30/2024  0850 Given 100 mg Oral Jennifer Burgess RN    5/29/2024 2227 Given 100 mg Oral Lili Giordano RN          magnesium oxide (Mag-Ox) tab 400 mg       Date Action Dose Route User    5/30/2024 1004 Given 400 mg Oral Jennifer Burgess RN          pantoprazole (Protonix) DR tab 40 mg       Date Action Dose Route User    5/30/2024 0851 Given 40 mg Oral Jennifer Burgess RN          prenatal vitamin with DHA (PNV with DHA) cap 1 capsule       Date Action Dose Route User    5/30/2024 0851 Given 1 capsule Oral Jennifer Burgess RN          pyridoxine (Vitamin B6) tab 50 mg       Date Action Dose Route User    5/30/2024 0850 Given 50 mg Oral Jennifer Burgess RN          cholecalciferol (Vitamin D3) tab 2,000 Units       Date Action Dose Route User    5/30/2024 0851 Given 2,000 Units Oral Jennifer Burgess RN            Vitals (last day)       Date/Time Temp Pulse Resp BP SpO2 Weight O2 Device O2 Flow Rate (L/min) [facility]    05/30/24 1357 97.9 °F (36.6 °C) 95 16 145/80 -- -- -- --     05/30/24 1007 -- 111 -- 137/76 -- -- -- --     05/30/24 0905 98.1 °F (36.7 °C) 103 18 144/84 -- -- -- --     05/29/24 2200 98.2 °F (36.8 °C) 96 -- 144/90 -- -- -- -- KR    05/29/24 1759 -- 129 -- 147/92 -- -- -- -- JN    05/29/24 1743 -- 114 -- 153/92 -- -- -- -- JN    05/29/24 1742 99 °F (37.2 °C) -- 16 -- -- 236 lb -- -- ASHLEE

## 2024-05-31 VITALS
BODY MASS INDEX: 40.29 KG/M2 | RESPIRATION RATE: 18 BRPM | OXYGEN SATURATION: 96 % | HEART RATE: 95 BPM | SYSTOLIC BLOOD PRESSURE: 135 MMHG | HEIGHT: 64 IN | DIASTOLIC BLOOD PRESSURE: 74 MMHG | TEMPERATURE: 98 F | WEIGHT: 236 LBS

## 2024-05-31 PROBLEM — O36.8190 DECREASED FETAL MOVEMENTS AFFECTING MANAGEMENT OF MOTHER, ANTEPARTUM (HCC): Status: ACTIVE | Noted: 2024-05-31

## 2024-05-31 LAB
ALBUMIN SERPL-MCNC: 2.7 G/DL (ref 3.4–5)
ALBUMIN/GLOB SERPL: 0.6 {RATIO} (ref 1–2)
ALP LIVER SERPL-CCNC: 113 U/L
ALT SERPL-CCNC: 24 U/L
ANION GAP SERPL CALC-SCNC: 7 MMOL/L (ref 0–18)
AST SERPL-CCNC: 29 U/L (ref 15–37)
BASOPHILS # BLD: 0 X10(3) UL (ref 0–0.2)
BASOPHILS NFR BLD: 0 %
BILIRUB SERPL-MCNC: 0.4 MG/DL (ref 0.1–2)
BUN BLD-MCNC: 10 MG/DL (ref 9–23)
CALCIUM BLD-MCNC: 9.5 MG/DL (ref 8.5–10.1)
CHLORIDE SERPL-SCNC: 108 MMOL/L (ref 98–112)
CO2 SERPL-SCNC: 21 MMOL/L (ref 21–32)
CREAT BLD-MCNC: 0.64 MG/DL
EGFRCR SERPLBLD CKD-EPI 2021: 120 ML/MIN/1.73M2 (ref 60–?)
EOSINOPHIL # BLD: 0 X10(3) UL (ref 0–0.7)
EOSINOPHIL NFR BLD: 0 %
ERYTHROCYTE [DISTWIDTH] IN BLOOD BY AUTOMATED COUNT: 13.2 %
GLOBULIN PLAS-MCNC: 4.2 G/DL (ref 2.8–4.4)
GLUCOSE BLD-MCNC: 113 MG/DL (ref 70–99)
GLUCOSE BLD-MCNC: 120 MG/DL (ref 70–99)
GLUCOSE BLD-MCNC: 125 MG/DL (ref 70–99)
HCT VFR BLD AUTO: 31.7 %
HGB BLD-MCNC: 10.8 G/DL
LYMPHOCYTES NFR BLD: 1.16 X10(3) UL (ref 1–4)
LYMPHOCYTES NFR BLD: 7 %
MCH RBC QN AUTO: 29.7 PG (ref 26–34)
MCHC RBC AUTO-ENTMCNC: 34.1 G/DL (ref 31–37)
MCV RBC AUTO: 87.1 FL
MONOCYTES # BLD: 0.83 X10(3) UL (ref 0.1–1)
MONOCYTES NFR BLD: 5 %
MORPHOLOGY: NORMAL
NEUTROPHILS # BLD AUTO: 13.42 X10 (3) UL (ref 1.5–7.7)
NEUTROPHILS NFR BLD: 86 %
NEUTS BAND NFR BLD: 2 %
NEUTS HYPERSEG # BLD: 14.52 X10(3) UL (ref 1.5–7.7)
OSMOLALITY SERPL CALC.SUM OF ELEC: 283 MOSM/KG (ref 275–295)
PLATELET # BLD AUTO: 288 10(3)UL (ref 150–450)
PLATELET MORPHOLOGY: NORMAL
POTASSIUM SERPL-SCNC: 4.4 MMOL/L (ref 3.5–5.1)
PROT SERPL-MCNC: 6.9 G/DL (ref 6.4–8.2)
RBC # BLD AUTO: 3.64 X10(6)UL
SODIUM SERPL-SCNC: 136 MMOL/L (ref 136–145)
TOTAL CELLS COUNTED BLD: 100
WBC # BLD AUTO: 16.5 X10(3) UL (ref 4–11)

## 2024-05-31 PROCEDURE — 99238 HOSP IP/OBS DSCHRG MGMT 30/<: CPT | Performed by: OBSTETRICS & GYNECOLOGY

## 2024-05-31 RX ORDER — INSULIN DEGLUDEC 100 U/ML
80 INJECTION, SOLUTION SUBCUTANEOUS NIGHTLY
Status: DISCONTINUED | OUTPATIENT
Start: 2024-05-31 | End: 2024-05-31

## 2024-05-31 NOTE — PROGRESS NOTES
Discharged to home per wheelchair in stable condition with written and verbal instructions. Patient verbalizes understanding of information given.

## 2024-05-31 NOTE — DISCHARGE INSTRUCTIONS
Discharge Instructions    Diet: Regular diet  Activity: Normal activity         General Instructions    Call your OB doctor if: Fluid leaking from your vagina;Uterine contractions 10 minutes or closer for 1 to 2 hours;Uterine contractions increasing in intensity and frequency;Decrease in fetal movement;Vaginal or rectal pressure;Temperature greater than 100F;Vaginal bleeding

## 2024-05-31 NOTE — PROGRESS NOTES
Outpatient Maternal-Fetal Medicine Consultation     Dear Dr. Casanova     Thank you for requesting an ultrasound and maternal-fetal medicine consultation on your patient Tammy Ramirez.  As you are aware she is a 33 year old female  with a dillon pregnancy and an Estimated Date of Delivery: 24.  She returned to maternal-fetal medicine today for a follow-up visit.  Her history was reviewed from her prior visit and there were no interval changes.  She was admitted last week and diagnosed with preeclampsia     Antepartum Risk Factors  Class II obesity complicating pregnancy  Chronic migraines  Hypertension related to severe migraine  GDM A2  Preeclampsia without severe features     PHYSICAL EXAMINATION:  /82 (BP Location: Right arm, Patient Position: Sitting, Cuff Size: adult)   Pulse 111   Ht 5' 4\" (1.626 m)   Wt 236 lb (107 kg)   LMP 2023 (Exact Date)   BMI 40.51 kg/m²   General: alert and oriented, no acute distress  Abdomen: obese, gravid, soft, non-tender  Extremities: non-tender, no edema     OBSTETRIC ULTRASOUND    Ultrasound Findings:  Single IUP in cephalic presentation.    Placenta is anterior.   A 3 vessel cord is noted.  Cardiac activity is present at 125 bpm  EFW 2765 g ( 6 lb 2 oz); 74%.    DEBBIE is  13.9 cm.  MVP is 5.8 cm  BPP is 8/8.     The fetal measurements are consistent with established EDC. No gross ultrasound evidence of structural abnormalities are seen today. The patient understands that ultrasound cannot rule out all structural and chromosomal abnormalities.     I interpreted the results and reviewed them with the patient.     DISCUSSION  During her visit we discussed and reviewed the following issues:  OBESITY  See prior M notes for a detailed review.     MIGRAINES  See prior [facility] notes for a detailed review.     GESTATIONAL DIABETES - follow-up  ADA diet recommendations were reviewed and the patient's dietary compliance is adequate.  Her capillary blood  glucose records were reviewed today; her compliance with the recommended four times daily assessments (fasting and two-hour post-prandial) is adequate.  Her overall glucose control is adequate.    Her compliance with her insulin regimen was reviewed and it is adequate.   Her current insulin regimen is:  Lantus 80 units nightly    Medical Regimen Recommendation:   Lantus 80 units nightly    Continued medication use and capillary blood glucose assessments were reviewed as well as recommendations for serial growth ultrasounds and antepartum testing.       PREECLAMPSIA    BP Review  Anti-Hypertensive medications:  Labetalol 100 mg two times daily   Home BP's: 120-130's/70-80's  Her blood pressure control is adequate.  Medical Regimen Recommendation: no change.    Discussion  Preeclampsia refers to the new onset of hypertension and proteinuria after 20 weeks of gestation in a previously normotensive woman.  Preeclampsia occurs in approximately 3 to 14 percent of all pregnancies worldwide, and about 5 to 8 percent in the [place].  The pathogenesis of preeclampsia is incompletely understood, but the disorder is clearly initiated by the presence of the trophoblast, and impaired placental angiogenesis plays an important role.   The clinical manifestations of preeclampsia can appear anytime from the second trimester to the first few weeks postpartum. The majority of cases of preeclampsia arise in low-risk nulliparous women without medical complications or identifiable risk factors.   Women with early, severe preeclampsia are at greatest risk of recurrence (25 to 65 percent), the incidence is much lower (5 to 7 percent) in women who had mild preeclampsia during the first pregnancy.               The ability to predict preeclampsia is currently of limited benefit because neither the development of the disorder nor its progression from mild to severe disease can be prevented in most patients, and there is no cure except  delivery. Nevertheless, the accurate identification of women at risk, early diagnosis, and prompt and appropriate management will lead to an improvement in maternal, and possibly , outcome.  Risk factors for preeclampsia include: Nulliparity,  Preeclampsia in a previous pregnancy,  Age >40 years or <18 years,  Family history of pregnancy-induced hypertension, Chronic hypertension,  Chronic renal disease,  Antiphospholipid antibody syndrome or inherited thrombophilia, Vascular or connective tissue disease, Diabetes mellitus (pregestational and gestational),  Multifetal gestation, High body mass index,  Male partner whose previous partner had preeclampsia, Hydrops fetalis, and Unexplained fetal growth restriction.                 The weight of evidence indicates that inherited thrombophilias (such as Factor V Leiden mutation, prothrombin gene mutation, protein C or S deficiency, antithrombin III deficiency) are not associated with preeclampsia. Therefore, screening pregnant women for inherited thrombophilias is not useful for predicting those at high risk of developing preeclampsia.  Antiphospholipid antibody syndrome is associated with development of severe early preeclampsia and screening is recommended.  Measurement of angiogenic factors (VEGF, PlGF, sFlt-1, Shahram) in blood or urine is the most promising approach for predicting preeclampsia; however, these tests are investigational and are not available for clinical use at present.  Data from multiple observational studies suggest that preeclampsia predicts remote cardiovascular events (eg, hypertension, ischemic heart disease, stroke). Review of all of a woman's pregnancies is necessary to define her long-term risk accurately. Those with early onset severe preeclampsia, recurrent preeclampsia, gestational hypertension, or preeclampsia with onset as a multipara appear to be at highest risk of cardiovascular disease later in life, including during the  premenopausal period. These women may have unrecognized latent hypertension, or other genetic or environmental factors predisposing them to hypertension during and subsequent to pregnancy. In contrast, preeclampsia/eclampsia occurring late in gestation in primigravid women and followed by a normotensive pregnancy does not appear to be associated with increased remote cardiovascular risk.       A 2015 meta-analysis of individual patient data from over 75,000 women with preeclampsia who became pregnant again found that 16 percent developed recurrent preeclampsia and 20 percent developed hypertension alone in a subsequent pregnancy.  The recurrence risk varies with the severity and time of onset of the initial episode.Women with early-onset, severe preeclampsia are at greatest risk of recurrence (as high as 25 to 65 percent). The risk of preeclampsia in a second pregnancy is much lower (5 to 7 percent) for women who had preeclampsia without severe features in their first pregnancy and less than 1 percent in women who had a normotensive first pregnancy (does not apply to abortions). These relationships were illustrated by a series of 125 women with severe second-trimester preeclampsia followed for five years: 65 percent developed recurrent preeclampsia and 35 percent were normotensive in their subsequent pregnancy. Of the preeclamptic group, approximately one-third developed the disease at ?27 weeks, one-third at 28 to 36 weeks, and one-third at ?37 weeks. Thus, 21 percent of subsequent pregnancies were complicated by severe preeclampsia in the second trimester.  Recurrent preeclampsia is more likely following a preeclamptic dillon pregnancy than a preeclamptic twin pregnancy.The recurrence risk in women with HELLP syndrome (who may develop either HELLP or preeclampsia in a subsequent pregnancy) is discussed separately.    Many studies have been performed to try to find a way to prevent preeclampsia.  These include  the use of fish oil, vitamin C, Vitamin E, calcium and aspirin.  A few studies have shown benefit with aspirin but others have not.  Aspirin therapy is not recommended for women at low-risk for development of preeclampsia. We suggest use of low dose aspirin in women at moderate to high risk of developing preeclampsia, but no therapy is a reasonable alternative.   No drug prevents progression to more severe disease; use of any drug in this setting is not recommended.       IMPRESSION:  IUP at 32w0d  Normal fetal growth &  testing  Class II obesity complicating pregnancy  Chronic migraines  Hypertension, chronic with superimposed preeclampsia without severe features  GDM A2  Preeclampsia without severe features     RECOMMENDATIONS:  Continue care with Dr. Casanova  Continue insulin  Continue four times daily capillary blood glucose assessments (fasting and 2 hour postprandial)  Weekly Maternal-Fetal Medicine review of capillary blood glucose values  Twice-weekly antepartum monitoring consisting of weekly NSTs and weekly BPP's  Continue monthly growth ultrasounds  Serum laboratory studies every 1-2 weeks to evaluate for progression to severe features of preeclampsia  Continue twice daily home blood pressure measurements  weekly review of blood pressure log  Notify physician if systolic BP's > 160's or diastolic BP's >105  Delivery at 37 weeks gestation or sooner should severe features of preeclampsia or gestational hypertension develop  Obviously an immediate delivery should a nonreassuring fetal status be noted  She is to continue to follow with the pain management team for her migraines  Low-dose aspirin, 2 tablets (162 mg total daily)  Limit gestational weight gain to 20 pounds or less     Thank you for allowing me to participate in the care of your patient.  Please do not hesitate to contact me if additional questions or concerns arise.       Tyler Marroquin M.D.  Maternal-Fetal Medicine    30 minutes  spent in review of records, patient consultation, documentation and coordination of care.  The relevant clinical matter(s) are summarized above.      Note to patient and family  The 21st Century Cures Act makes medical notes available to patients in the interest of transparency.  However, please be advised that this is a medical document.  It is intended as azsu-el-cyct communication.  It is written and medical language may contain abbreviations or verbiage that are technical and unfamiliar.  It may appear blunt or direct.  Medical documents are intended to carry relevant information, facts as evident, and the clinical opinion of the practitioner.

## 2024-05-31 NOTE — PROGRESS NOTES
HD#2  Patient has no complaints, denies headache , blurred vision, epigastric pain. Baby is active and moving    /74   Pulse 95   Temp 98.4 °F (36.9 °C) (Oral)   Resp 18   Ht 64\"   Wt 236 lb (107 kg)   LMP 09/22/2023 (Exact Date)   SpO2 96%   BMI 40.51 kg/m²     Recent Labs   Lab 05/29/24  1809 05/31/24  0902   RBC 3.72* 3.64*   HGB 10.7* 10.8*   HCT 32.7* 31.7*   MCV 87.9 87.1   MCH 28.8 29.7   MCHC 32.7 34.1   RDW 13.2 13.2   NEPRELIM 10.24* 13.42*   WBC 13.4* 16.5*   .0 288.0       Recent Labs   Lab 05/29/24  1809 05/31/24  0902   GLU 79 125*   BUN 6* 10   CREATSERUM 0.39* 0.64   EGFRCR 136 120   CA 9.0 9.5   ALB 2.6* 2.7*    136   K 3.6 4.4    108   CO2 20.0* 21.0   ALKPHO 110* 113*   AST 16 29   ALT 24 24   BILT 0.3 0.4   TP 6.8 6.9       TOCO: no contractions  FHT: moderate variability, accelerations present, no decelerations    A/P: IUP 33 weeks,   cHTN, rule out superimposed pre eclampsia   - w/ elevated BP and elevated protein to creatine ratio  - recommend inpatient BP monitoring  - continue Labetalol 100 mg PO BID  - 24 urine protein collection - 363  - MFM consult   - continue to monitor for signs and symptoms of pre eclampsia with severe features  - BMTZ x 2 doses  - FHT monitoring x 1 hr BID   - GBS ordered      GDMA2  - sliding scale, MFM managing  - MFM consult and appreciate further recommendations   - accuchecks  - diabetic diet     Discussed with tessa Redman to manage out patiently

## 2024-05-31 NOTE — PLAN OF CARE
Problem: ANTEPARTUM/LABOR and DELIVERY  Goal: Maintain pregnancy as long as maternal and/or fetal condition is stable  Description: INTERVENTIONS:  - Maternal surveillance  - Fetal surveillance  - Monitor uterine activity  - Medications as ordered  - Bedrest  Outcome: Completed  Goal: Anxiety is at manageable level  Description: INTERVENTIONS:  - [place] patient to unit/surroundings  - Establish a trusting relationship with patient  - Discuss possible complications or alterations in birth plan  - Explain treatment plan  - Explain testing/procedures prior to initiation  - Encourage participation in care  - Encourage verbalization of concerns/fears  - Identify coping mechanisms  - Administer/offer alternative therapies (Aroma therapy, distraction, guided imagery, massage, music therapy, therapeutic touch)  - Manage patient's environment (Avoid overstimulation of patient)  Outcome: Completed  Goal: Demonstrates ability to cope with hospitalization/illness  Description: INTERVENTIONS:  - Encourage verbalization of feelings/concerns/expectations  - Provide quiet environment  - Assist patient to identify own strengths and abilities  - Encourage patient to set small goals for self  - Encourage participation in diversional activity  - Reinforce positive adaptation of new coping behaviors  - Include patient/family/caregiver in decisions  Outcome: Completed     Problem: Patient/Family Goals  Goal: Patient/Family Long Term Goal  Description: Patient's Long Term Goal: Maintain pregnancy as long as maternal and fetal conditions are stable    Interventions:  -   - See additional Care Plan goals for specific interventions  Outcome: Completed  Goal: Patient/Family Short Term Goal  Description: Patient's Short Term Goal: Maintain BP within normal limits    Interventions:     - See additional Care Plan goals for specific interventions  Outcome: Completed

## 2024-05-31 NOTE — DISCHARGE SUMMARY
Admittion Date:5/29/24  Discharge Date:5/31/24  Diagnosis:IUP 33 weeks, chronic HTN with superimposed PIH, GDA2    Hospital stay: Patient admitted for decreased fetal movement , received 2 doses of betamethasone, 24 urine protein 363. Discharged home in stable position, f/u within 1 week. MFM managing insuline coverage and will see patient for BPP weekly

## 2024-06-03 RX ORDER — LABETALOL 100 MG/1
100 TABLET, FILM COATED ORAL 2 TIMES DAILY
Qty: 60 TABLET | Refills: 0 | Status: SHIPPED | OUTPATIENT
Start: 2024-06-03

## 2024-06-04 ENCOUNTER — ROUTINE PRENATAL (OUTPATIENT)
Facility: CLINIC | Age: 34
End: 2024-06-04
Payer: MEDICAID

## 2024-06-04 VITALS
WEIGHT: 235.63 LBS | SYSTOLIC BLOOD PRESSURE: 140 MMHG | HEIGHT: 64 IN | DIASTOLIC BLOOD PRESSURE: 92 MMHG | BODY MASS INDEX: 40.23 KG/M2 | HEART RATE: 102 BPM

## 2024-06-04 DIAGNOSIS — O24.414 INSULIN CONTROLLED GESTATIONAL DIABETES MELLITUS (GDM) IN SECOND TRIMESTER (HCC): ICD-10-CM

## 2024-06-04 DIAGNOSIS — O10.919 CHRONIC HYPERTENSION AFFECTING PREGNANCY (HCC): Primary | ICD-10-CM

## 2024-06-04 PROCEDURE — 59025 FETAL NON-STRESS TEST: CPT

## 2024-06-04 PROCEDURE — 99215 OFFICE O/P EST HI 40 MIN: CPT

## 2024-06-04 NOTE — PROGRESS NOTES
KYLEIGH 33w4d    She is doing well, no complaints.  -BP in clinic 140/92 and recheck 120/70,   at home bp's 119/80, 121/82, 136/85, 116/73, 121/78    NST reactive, baseline 130's, moderate variability, accels to 160's      FRANCIS 7/19/24 by 6w3d US not c/w LMP      GIRL  -NIPS neg, NT normal.    -carrier neg   -AFP low risk    -Flu 23-24 - declines   -2023 COVID-19 booster encouraged   -CBC, 3rd tri HIV and T Pallidum discussed - done   -TDAP - declined  -classes, pre-registration, pediatrician (Dr. Long), breast pump, car seat discussed   -H/o spinal headaches after epidural      Chronic Hypertension - started on labetalol at 17 wk   -Never formally diagnosed with CHTN, but multiple high readings prior to pregnancy (per EMR since at least 2018). See previous OB notes. Hypertensive even at 6 wk  -aspirin recommended - taking 81 mg tab x 2 daily   -Baseline EKG - 1/21 - ABNORMAL. Cannot rule out anterior infarct   -Baseline CMP & urine P/C normal. TSH 0.235 (L) - 12/24  -1/24/24 - /90 - 14w5d (NO CURRENT HA.) Patient bp at home 120's-130's/70-80's  -Recommend surveillance as if chronic HTN - reviewed potential for placental damage, fetal growth restriction, stillbirth. [facility] Dr. Rosado in agreement    -BP monitoring at home   -2/7/24 /98 - 16w5d - Rx labetalol 100 mg BID   -L2 US normal   -Growth US q 4 wk in 3rd trim (with BPP at or beyond 32 wk) - ordered   Appt  4/26: EFW 67%ile, vertex, DEBBIE 18   Appt 5/24  -EFW 2146 g ( 4 lb 12 oz); 65%.                 -Weekly NSTs at 32 weeks; twice weekly NST's at 34 wk  Delivery at 38-39  weeks advised for suboptimally medication controlled diabetes   and cHTN     ABNORMAL EKG - 1/21/24  -1/21/24 - EKG report reads cannot rule out anterior infarct. Appears changed from 1/13/23 EKG.   -Cardiology 2/1/24 Dr. Martinez.                Dr. Martinez says 1/21/24 EKG was just low voltage in lead V3, no infarct  2/1/24 EKG - NSR, right axis deviation, no ischemia,  borderline  2/19/24 Holter monitor x 1 week - NSR (HR  bpm, mean 92). No Afib  3/13/24 TTE at 21+ wk LVEF 60-65%. No wall motion abnormality.   No significant valvular stenosis or regurgitation.   -appt  3/19 to discuss results of Holter & Echo with cardiologist.  - plan follow up appointment 2 months post partum        GDM on insulin per MFM  -Dx 16 wk -> insulin by 20 wk   -Diabetes education done 2/12/24. Follow up visit 3/4/24   -3/6/24 Lantus 24 units HS per MFM - now 80U at bedtime as of 5/31  -A1c about once per trimester - order placed.   -growth US & NSTs as above  Delivery at 38-39  weeks advised for suboptimally medication controlled diabetes & HTN        Obesity (pre preg BMI 36)   -limit weight gain 11-20 lb   -L2US, growth US, NSTs per above     H/o intractable migraines  -onset age 11.  20 or more migraines per month.   -only 4-5 hours of sleep on regular basis   -MRI C spine 2020 - Mild multilevel disc desiccation and bulging without significant loss of intervertebral disc space height. No significant spinal canal or neuroforaminal stenosis.   -MRI brain 2/12/21 - No acute intracranial abnormality.   -MRI c spine 8/21 - unremarkable. Done for left-sided facial and arm   numbness and radiculopathy.   -Past treatments: Botox, Nurtec, Aimovig, Ajovy, Amitriptyline, nortriptyline, propranolol, Depakote, Topamax, venlafaxine, Norco, Fioricet, multiple triptans, flexeril, and OTC analgesics. She has had facet and cervical epidurals as well as SPG block none of which provided much relief. Osteopathic manipulation therapy, PT, chiropractic, massage acupuncture, magnesium, CoQ10, vitamin B complex, elimination diet.   -Botox helped partially in the past   -Neurologist Dr. Robe Gutierrez - currently taking Naratriptan 2.5 mg PRN  -Took Qulipta x 1 week & seemed to be working - stopped when found out she is pregnant.   -per patient, when she notified Dr. Gutierrez she is pregnant, he is no longer  comfortable managing her migraine medication.   -Pain management 12/7/23 bilateral occipital neuralgia. Migraines originate from the occipital region, L>R. Bilateral occipital block, pending insurance approval and OBGyn approval.  Follow-up 2 to 3 weeks thereafter.   -1/10/24 - Bilateral greater occipital nerve block done. 100% improvement for 24 hr.   -1/24/24 - Sleep study ordered - not yet.    -2/2/24 Pain management f/u visit - consider repeat nerve blocks  -3/18 - 22w3d - migraines have improved significantly since around 19 wk (Note: started labetalol at 17 wk). Does not feel will need nerve block again at this time.   -her migraines have improved, takes Naratriptan only when needed     Decreased fetal movement at 32w5d    -sent to L&D for further monitoring     -24 hour urine 363 and 2 doses of betamethasone given.  -has now weekly MFM appointment     CHEYANNE, insomnia  -meds: none currently. H/o zoloft, clonazepam, venlafaxine   -therapist - not currently. Not very interested. Resources given   -3/18 - 22w3d - Rx hydroxyzine PRN      H/o PVCs  -Fhx Brugada syndrome - she tested negative   -3/31/2021 TTE - LVEF 55-60%. Unremarkable  -Holter monitor evaluation 4/2021 - was told her PVCs are minor (2%) and offered her medications if she wanted it - she declined - feeling fine.  -2/1/24 cardiologist Dr. Martinez. EKG - NSR, right axis deviation, no ischemia, borderline  -2/19/24 Holter monitor x 1 week - normal   -3/13/24 TTE at 21+ wk LVEF 60-65%. normal     Subclinical hyperthyroidism  -Low TSH with normal FT4 & FT3 noted on initial prenatal labs & in 2nd trimester. No treatment needed      Vitamin D deficiency  -1/6/24 Vit D 12.8 at 12 wk   -optimal dosing in pregnancy is controversial, but given starting out very low, Rx 50,000 international units weekly for 8 weeks, then switch to 4000 units daily over the counter in addition to prenatal vitamin      Bloating, constipation/difficulty evacuating stools,  JAKI  -encouraged increased fiber & water  -possible pelvic floor dysfunction  - is seeing therapist      Pap neg, +HPV 18/45 (11/27/23)  -2/7 - 16w5d - colposcopy - cervical biopsy polypoid endocervical glandular tissue - Dr. Rock   -recommend pap & HPV co-testing at 1 year.

## 2024-06-07 ENCOUNTER — OFFICE VISIT (OUTPATIENT)
Dept: PERINATAL CARE | Facility: HOSPITAL | Age: 34
End: 2024-06-07
Attending: OBSTETRICS & GYNECOLOGY
Payer: MEDICAID

## 2024-06-07 VITALS
SYSTOLIC BLOOD PRESSURE: 123 MMHG | DIASTOLIC BLOOD PRESSURE: 82 MMHG | HEART RATE: 111 BPM | HEIGHT: 64 IN | WEIGHT: 236 LBS | BODY MASS INDEX: 40.29 KG/M2

## 2024-06-07 DIAGNOSIS — I10 CHRONIC HYPERTENSION: ICD-10-CM

## 2024-06-07 DIAGNOSIS — O24.419 GDM (GESTATIONAL DIABETES MELLITUS) (HCC): Primary | ICD-10-CM

## 2024-06-07 DIAGNOSIS — O24.414 INSULIN CONTROLLED GESTATIONAL DIABETES MELLITUS (GDM) IN THIRD TRIMESTER (HCC): ICD-10-CM

## 2024-06-07 DIAGNOSIS — O24.419 GDM (GESTATIONAL DIABETES MELLITUS) (HCC): ICD-10-CM

## 2024-06-07 DIAGNOSIS — O11.9 PREECLAMPSIA COMPLICATING HYPERTENSION (HCC): ICD-10-CM

## 2024-06-07 PROCEDURE — 76819 FETAL BIOPHYS PROFIL W/O NST: CPT | Performed by: OBSTETRICS & GYNECOLOGY

## 2024-06-07 PROCEDURE — 76815 OB US LIMITED FETUS(S): CPT

## 2024-06-07 NOTE — PROGRESS NOTES
Pt here for BPP  + FM  Pt newly dx preeclampsia, denies headaches, visual disturbances and epigastric discomfort.  Pt c/o occas back cramping. Denies uterine ctx, vag bleeding and leaking fluid.

## 2024-06-07 NOTE — PROGRESS NOTES
Outpatient Maternal-Fetal Medicine Follow-Up     Dear Dr. Casanova     Thank you for requesting an ultrasound and maternal-fetal medicine consultation on your patient Tammy Ramirez.  As you are aware she is a 33 year old female  with a dillon pregnancy and an Estimated Date of Delivery: 24.  She returned to maternal-fetal medicine today for a follow-up visit.  Her history was reviewed from her prior visit and there were no interval changes.  She was admitted last week and diagnosed with preeclampsia     Antepartum Risk Factors  Class II obesity complicating pregnancy  Chronic migraines  Hypertension related to severe migraine  GDM A2  Preeclampsia without severe features     PHYSICAL EXAMINATION:  BP (!) 134/91 (BP Location: Right arm, Patient Position: Sitting, Cuff Size: adult)   Pulse 120   Ht 5' 4\" (1.626 m)   Wt 240 lb (108.9 kg)   LMP 2023 (Exact Date)   BMI 41.20 kg/m²   General: alert and oriented, no acute distress  Abdomen: obese, gravid, soft, non-tender  Extremities: non-tender, no edema     OBSTETRIC ULTRASOUND     Ultrasound Findings:  Single IUP in cephalic presentation.    Placenta is anterior.   Cardiac activity is present at 164 bpm  MVP is 6.3 cm . DEBBIE 20.5 cm  BPP is 8/8.      I interpreted the results and reviewed them with the patient.     DISCUSSION  During her visit we discussed and reviewed the following issues:  OBESITY  See prior MFM notes for a detailed review.     MIGRAINES  See prior M notes for a detailed review.     GESTATIONAL DIABETES- follow up  We reviewed the potential implications and risks associated with GDM to her and her fetus, especially when poorly controlled. We discussed the increased incidence of macrosomia and the potential for related birth injury to her and her baby. We talked about the increase risks associated risk of fetal hyperinsulinemia, jaundice, electrolyte imbalance, seizure activity, IUFD and other adverse obstetric outcomes.  We also reviewed her  increased risk of having diabetes later in life. The importance of good glycemic control and avoidance of prolonged hypoglycemia and hyperglycemia was addressed.  See [facility] note from 3/1/2024 for detailed review     Her capillary blood glucose records were reviewed today; BS controlled   Her overall glucose control is good     She is taking Lantus 80 units nightly        Insulin dose:  Lantus 80units nightly which she was advised to continue.     I answered her questions regarding timing of delivery and  testing.     PREECLAMPSIA    BP Review  Anti-Hypertensive medications:  Labetalol 100 mg two times daily  Home BP's: 130's/70-80's  Her blood pressure control is adequate.  Medical Regimen Recommendation: no change.  See prior [facility] notes for a detailed review.    IMPRESSION:  IUP at 35w0d  Class II obesity complicating pregnancy  Chronic migraines  Hypertension, chronic with superimposed preeclampsia without severe features  GDM A2  Preeclampsia without severe features     RECOMMENDATIONS:  Continue care with Dr. Casanova  Continue insulin  Continue four times daily capillary blood glucose assessments (fasting and 2 hour postprandial)  Weekly Maternal-Fetal Medicine review of capillary blood glucose values  Twice-weekly antepartum monitoring consisting of weekly NSTs and weekly BPP's  Continue monthly growth ultrasounds  Serum laboratory studies every 1-2 weeks to evaluate for progression to severe features of preeclampsia  Continue twice daily home blood pressure measurements  weekly review of blood pressure log  Notify physician if systolic BP's > 160's or diastolic BP's >105  Delivery at 37 weeks gestation or sooner should severe features of preeclampsia or gestational hypertension develop  Obviously an immediate delivery should a nonreassuring fetal status be noted  She is to continue to follow with the pain management team for her migraines  Low-dose aspirin, 2 tablets (162 mg total  daily)  Limit gestational weight gain to 20 pounds or less     Thank you for allowing me to participate in the care of your patient.  Please do not hesitate to contact me if additional questions or concerns arise.       Tyler Marroquin M.D.  Maternal-Fetal Medicine     20 minutes spent in review of records, patient consultation, documentation and coordination of care.  The relevant clinical matter(s) are summarized above.      Note to patient and family  The 21st Century Cures Act makes medical notes available to patients in the interest of transparency.  However, please be advised that this is a medical document.  It is intended as [id number] communication.  It is written and medical language may contain abbreviations or verbiage that are technical and unfamiliar.  It may appear blunt or direct.  Medical documents are intended to carry relevant information, facts as evident, and the clinical opinion of the practitioner.

## 2024-06-11 ENCOUNTER — ROUTINE PRENATAL (OUTPATIENT)
Facility: CLINIC | Age: 34
End: 2024-06-11
Payer: MEDICAID

## 2024-06-11 ENCOUNTER — TELEPHONE (OUTPATIENT)
Facility: CLINIC | Age: 34
End: 2024-06-11

## 2024-06-11 VITALS
HEIGHT: 64 IN | BODY MASS INDEX: 41.18 KG/M2 | HEART RATE: 102 BPM | WEIGHT: 241.19 LBS | DIASTOLIC BLOOD PRESSURE: 90 MMHG | SYSTOLIC BLOOD PRESSURE: 144 MMHG

## 2024-06-11 DIAGNOSIS — O14.93 PRE-ECLAMPSIA IN THIRD TRIMESTER (HCC): ICD-10-CM

## 2024-06-11 DIAGNOSIS — O10.919 CHRONIC HYPERTENSION AFFECTING PREGNANCY (HCC): Primary | ICD-10-CM

## 2024-06-11 PROCEDURE — 59025 FETAL NON-STRESS TEST: CPT | Performed by: STUDENT IN AN ORGANIZED HEALTH CARE EDUCATION/TRAINING PROGRAM

## 2024-06-11 PROCEDURE — 99213 OFFICE O/P EST LOW 20 MIN: CPT | Performed by: STUDENT IN AN ORGANIZED HEALTH CARE EDUCATION/TRAINING PROGRAM

## 2024-06-11 NOTE — PROGRESS NOTES
KYLEIGH -  34w4d     Pt doing well, no complaints  Bps at home have been normal  Discussed repeating preE labs weekly  Discussed IOL 37 wk and will schedule    FRANCIS 7/19/24 by 6w3d US not c/w LMP      GIRL  -NIPS neg, NT normal.    -carrier neg   -AFP low risk    -Flu 23-24 - declines   -2023 COVID-19 booster encouraged   -CBC, 3rd tri HIV and T Pallidum discussed - done   -TDAP - declined  -classes, pre-registration, pediatrician (Dr. Long), breast pump, car seat discussed   -H/o spinal headaches after lumbar puncture     Chronic Hypertension - started on labetalol at 17 wk. NOW SUPERIMPOSED PREECLAMPSIA as OF 5/31  -Never formally diagnosed with CHTN, but multiple high readings prior to pregnancy (per EMR since at least 2018). See previous OB notes. Hypertensive even at 6 wk  -aspirin recommended - taking 81 mg tab x 2 daily   -Baseline EKG - 1/21 - ABNORMAL. Cannot rule out anterior infarct   -Baseline CMP & urine P/C normal. TSH 0.235 (L) - 12/24  -1/24/24 - /90 - 14w5d (NO CURRENT HA.) Patient bp at home 120's-130's/70-80's  -Recommend surveillance as if chronic HTN - reviewed potential for placental damage, fetal growth restriction, stillbirth. M Dr. Rosado in agreement    -BP monitoring at home   -2/7/24 /98 - 16w5d - Rx labetalol 100 mg BID   -L2 US normal   -Growth US q 4 wk in 3rd trim (with BPP at or beyond 32 wk) - ordered   Appt  4/26: EFW 67%ile, vertex, DEBBIE 18   Appt 5/24  -EFW 2146 g ( 4 lb 12 oz); 65%.    -Weekly NSTs at 32 weeks; twice weekly NST's at 34 wk  -pt observed on L&D 5/29-31 for elevated Bps and new proteinuria, received bmtz  Weekly BPP - has appts with M  Weekly preE labs  DELIVERY 37wk - will schedule for cytotec IOL     ABNORMAL EKG - 1/21/24  -1/21/24 - EKG report reads cannot rule out anterior infarct. Appears changed from 1/13/23 EKG.   -Cardiology 2/1/24 Dr. Martinez.                Dr. Martinez says 1/21/24 EKG was just low voltage in lead V3, no infarct  2/1/24 EKG -  NSR, right axis deviation, no ischemia, borderline  2/19/24 Holter monitor x 1 week - NSR (HR  bpm, mean 92). No Afib  3/13/24 TTE at 21+ wk LVEF 60-65%. No wall motion abnormality.   No significant valvular stenosis or regurgitation.   -appt  3/19 to discuss results of Holter & Echo with cardiologist.  - plan follow up appointment 2 months post partum        GDM on insulin per MFM  -Dx 16 wk -> insulin by 20 wk   -Diabetes education done 2/12/24. Follow up visit 3/4/24   -3/6/24 Lantus 24 units HS per MFM - now 80U at bedtime as of 5/31  -A1c about once per trimester - order placed.   -growth US & NSTs as above        Obesity (pre preg BMI 36)   -limit weight gain 11-20 lb   -L2US, growth US, NSTs per above     H/o intractable migraines  -onset age 11.  20 or more migraines per month.   -only 4-5 hours of sleep on regular basis   -MRI C spine 2020 - Mild multilevel disc desiccation and bulging without significant loss of intervertebral disc space height. No significant spinal canal or neuroforaminal stenosis.   -MRI brain 2/12/21 - No acute intracranial abnormality.   -MRI c spine 8/21 - unremarkable. Done for left-sided facial and arm   numbness and radiculopathy.   -Past treatments: Botox, Nurtec, Aimovig, Ajovy, Amitriptyline, nortriptyline, propranolol, Depakote, Topamax, venlafaxine, Norco, Fioricet, multiple triptans, flexeril, and OTC analgesics. She has had facet and cervical epidurals as well as SPG block none of which provided much relief. Osteopathic manipulation therapy, PT, chiropractic, massage acupuncture, magnesium, CoQ10, vitamin B complex, elimination diet.   -Botox helped partially in the past   -Neurologist Dr. Robe Gutierrez - currently taking Naratriptan 2.5 mg PRN  -Took Qulipta x 1 week & seemed to be working - stopped when found out she is pregnant.   -per patient, when she notified Dr. Gutierrez she is pregnant, he is no longer comfortable managing her migraine medication.   -Pain  management 12/7/23 bilateral occipital neuralgia. Migraines originate from the occipital region, L>R. Bilateral occipital block, pending insurance approval and OBGyn approval.  Follow-up 2 to 3 weeks thereafter.   -1/10/24 - Bilateral greater occipital nerve block done. 100% improvement for 24 hr.   -1/24/24 - Sleep study ordered - not yet.    -2/2/24 Pain management f/u visit - consider repeat nerve blocks  -3/18 - 22w3d - migraines have improved significantly since around 19 wk (Note: started labetalol at 17 wk). Does not feel will need nerve block again at this time.   -her migraines have improved, takes Naratriptan only when needed     CHEYANNE, insomnia  -meds: none currently. H/o zoloft, clonazepam, venlafaxine   -therapist - not currently. Not very interested. Resources given   -3/18 - 22w3d - Rx hydroxyzine PRN      H/o PVCs  -Fhx Brugada syndrome - she tested negative   -3/31/2021 TTE - LVEF 55-60%. Unremarkable  -Holter monitor evaluation 4/2021 - was told her PVCs are minor (2%) and offered her medications if she wanted it - she declined - feeling fine.  -2/1/24 cardiologist Dr. Martinez. EKG - NSR, right axis deviation, no ischemia, borderline  -2/19/24 Holter monitor x 1 week - normal   -3/13/24 TTE at 21+ wk LVEF 60-65%. normal     Subclinical hyperthyroidism  -Low TSH with normal FT4 & FT3 noted on initial prenatal labs & in 2nd trimester. No treatment needed      Vitamin D deficiency  -1/6/24 Vit D 12.8 at 12 wk   -optimal dosing in pregnancy is controversial, but given starting out very low, Rx 50,000 international units weekly for 8 weeks, then switch to 4000 units daily over the counter in addition to prenatal vitamin      Pap neg, +HPV 18/45 (11/27/23)  -2/7 - 16w5d - colposcopy - cervical biopsy polypoid endocervical glandular tissue - Dr. Rock   -recommend pap & HPV co-testing at 1 year.

## 2024-06-14 ENCOUNTER — ULTRASOUND ENCOUNTER (OUTPATIENT)
Dept: PERINATAL CARE | Facility: HOSPITAL | Age: 34
End: 2024-06-14
Attending: OBSTETRICS & GYNECOLOGY
Payer: MEDICAID

## 2024-06-14 VITALS
DIASTOLIC BLOOD PRESSURE: 91 MMHG | SYSTOLIC BLOOD PRESSURE: 134 MMHG | BODY MASS INDEX: 40.97 KG/M2 | HEART RATE: 120 BPM | WEIGHT: 240 LBS | HEIGHT: 64 IN

## 2024-06-14 DIAGNOSIS — O24.419 GDM (GESTATIONAL DIABETES MELLITUS) (HCC): Primary | ICD-10-CM

## 2024-06-14 DIAGNOSIS — O24.419 GDM (GESTATIONAL DIABETES MELLITUS) (HCC): ICD-10-CM

## 2024-06-14 DIAGNOSIS — O24.414 INSULIN CONTROLLED GESTATIONAL DIABETES MELLITUS (GDM) IN THIRD TRIMESTER (HCC): ICD-10-CM

## 2024-06-14 DIAGNOSIS — O11.9 PREECLAMPSIA COMPLICATING HYPERTENSION (HCC): ICD-10-CM

## 2024-06-14 DIAGNOSIS — I10 CHRONIC HYPERTENSION: ICD-10-CM

## 2024-06-14 PROCEDURE — 76819 FETAL BIOPHYS PROFIL W/O NST: CPT | Performed by: OBSTETRICS & GYNECOLOGY

## 2024-06-14 PROCEDURE — 76815 OB US LIMITED FETUS(S): CPT

## 2024-06-17 NOTE — PROGRESS NOTES
Outpatient Maternal-Fetal Medicine Follow-Up     Dear Dr. Casanova     Thank you for requesting an ultrasound and maternal-fetal medicine consultation on your patient Tammy Ramirez.  As you are aware she is a 33 year old female  with a dillon pregnancy and an Estimated Date of Delivery: 24.  She returned to maternal-fetal medicine today for a follow-up visit.  Her history was reviewed from her prior visit and there were no interval changes.  She was admitted last week and diagnosed with preeclampsia     Antepartum Risk Factors  Class II obesity complicating pregnancy  Chronic migraines  Hypertension related to severe migraine  GDM A2  Preeclampsia without severe features     S:  No s/sx of severe preeclampsia.  Good FM.      We discussed her upcoming induction of labor.  Explained to her that I would place orders for her to receive 60 units of long-acting insulin the night she comes in for Cytotec.  She could continue her usual diabetes care up until that point.  I also briefly explained blood sugar monitoring in labor and postpartum.  PHYSICAL EXAMINATION:  BP (!) 137/96 (BP Location: Right arm, Patient Position: Sitting, Cuff Size: adult)   Pulse 91   Ht 5' 4\" (1.626 m)   Wt 242 lb (109.8 kg)   LMP 2023 (Exact Date)   BMI 41.54 kg/m²   General: alert and oriented, no acute distress  Abdomen: obese, gravid, soft, non-tender  Extremities: non-tender, no edema     OBSTETRIC ULTRASOUND     Ultrasound Findings:  Single IUP in cephalic presentation.    Placenta is anterior.   Cardiac activity is present at 140 bpm  EFW 3371 g ( 7 lb 7 oz); 84%.    DEBBIE is  11.5 cm.  MVP is 6.7 cm  BPP is 8/8.     The fetal measurements are consistent with established EDC. No gross ultrasound evidence of structural abnormalities are seen today. The patient understands that ultrasound cannot rule out all structural and chromosomal abnormalities.      I interpreted the results and reviewed them with the  patient.     DISCUSSION  During her visit we discussed and reviewed the following issues:  OBESITY  See prior MFM notes for a detailed review.     MIGRAINES  See prior [facility] notes for a detailed review.     GESTATIONAL DIABETES- follow up  We reviewed the potential implications and risks associated with GDM to her and her fetus, especially when poorly controlled. We discussed the increased incidence of macrosomia and the potential for related birth injury to her and her baby. We talked about the increase risks associated risk of fetal hyperinsulinemia, jaundice, electrolyte imbalance, seizure activity, IUFD and other adverse obstetric outcomes. We also reviewed her  increased risk of having diabetes later in life. The importance of good glycemic control and avoidance of prolonged hypoglycemia and hyperglycemia was addressed.  See MFM note from 3/1/2024 for detailed review     Her capillary blood glucose records were reviewed today; BS controlled   Her overall glucose control is good     She is taking Lantus 80 units nightly        Insulin dose:  Lantus 80units nightly which she was advised to continue.     I answered her questions regarding timing of delivery and  testing.     PREECLAMPSIA    BP Review  Anti-Hypertensive medications:  Labetalol 100 mg two times daily  Home BP's: 130's/70-80's  Her blood pressure control is adequate.  Medical Regimen Recommendation: no change.  See prior [facility] notes for a detailed review.    IMPRESSION:  IUP at 36w0d  Normal fetal growth and BPP  Class II obesity complicating pregnancy  Chronic migraines  Hypertension, chronic with superimposed preeclampsia without severe features  GDM A2  Preeclampsia without severe features     RECOMMENDATIONS:  Continue care with Dr. Casanova  Continue insulin  Continue four times daily capillary blood glucose assessments (fasting and 2 hour postprandial)  Weekly Maternal-Fetal Medicine review of capillary blood glucose values  Twice-weekly  antepartum monitoring consisting of weekly NSTs and weekly BPP's  Serum laboratory studies every 1-2 weeks to evaluate for progression to severe features of preeclampsia  Continue twice daily home blood pressure measurements  weekly review of blood pressure log  Notify physician if systolic BP's > 160's or diastolic BP's >105  Delivery at 37 weeks gestation or sooner should severe features of preeclampsia or gestational hypertension develop  Obviously an immediate delivery should a nonreassuring fetal status be noted  She is to continue to follow with the pain management team for her migraines  Low-dose aspirin, 2 tablets (162 mg total daily)  Limit gestational weight gain to 20 pounds or less     Total time spent 40 minutes this calendar day which includes preparing to see the patient including chart review, obtaining and/or reviewing additional medical history, performing a physical exam and evaluation, documenting clinical information in the electronic medical record, independently interpreting results, counseling the patient, communicating results to the patient/family/caregiver and coordinating care.     Case discussed with patient who demonstrated understanding and agreement with plan.     Thank you for allowing me to participate in the care of this patient.  Please feel free to contact me with any questions.    Idalia Rosado MD  Maternal-Fetal Medicine       Note to patient and family  The 21st Century Cures Act makes medical notes available to patients in the interest of transparency.  However, please be advised that this is a medical document.  It is intended as [id number] communication.  It is written and medical language may contain abbreviations or verbiage that are technical and unfamiliar.  It may appear blunt or direct.  Medical documents are intended to carry relevant information, facts as evident, and the clinical opinion of the practitioner.

## 2024-06-19 ENCOUNTER — HOSPITAL ENCOUNTER (OUTPATIENT)
Facility: HOSPITAL | Age: 34
Discharge: HOME | End: 2024-06-19
Attending: OBSTETRICS & GYNECOLOGY | Admitting: OBSTETRICS & GYNECOLOGY

## 2024-06-19 ENCOUNTER — TELEPHONE (OUTPATIENT)
Facility: CLINIC | Age: 34
End: 2024-06-19

## 2024-06-19 VITALS
BODY MASS INDEX: 40.97 KG/M2 | HEIGHT: 64 IN | RESPIRATION RATE: 20 BRPM | SYSTOLIC BLOOD PRESSURE: 134 MMHG | TEMPERATURE: 98 F | HEART RATE: 120 BPM | DIASTOLIC BLOOD PRESSURE: 85 MMHG | WEIGHT: 240 LBS

## 2024-06-19 PROCEDURE — 59025 FETAL NON-STRESS TEST: CPT | Performed by: OBSTETRICS & GYNECOLOGY

## 2024-06-19 NOTE — PROGRESS NOTES
EFMs applied. FHTs 145. Pt states she has been having irreg ctxs since last night. PT states she had a ctx and then had a gush of fluid run down her leg. Pt denies any LOF at this time. Pt denies VB. +FM. Pt  has pre-eclampsia, anemia, and GDM. PT denies any other problems with pregnancy, Pt has CHTN and anxiety. Pt denies any other significant medical problems. Admission assessment initiated at this time.

## 2024-06-19 NOTE — PROGRESS NOTES
EFMs removed, FHTs 145. Discharge instructions reviewed with pt.Pt verbalizes understanding, denies questions. Pt changing at this time.

## 2024-06-19 NOTE — TELEPHONE ENCOUNTER
Patient called the office. She states she is experiencing intense contractions last night and again this past hour. She was blow drying her hair when she had a gush of fluid. Still leaking at present. Feels the baby move. Instructed to go to L&D. L&D aware. Understanding verbalized.

## 2024-06-19 NOTE — NST
Nonstress Test   Patient: Tammy Ramirez    Gestation: 35w5d    NST:       Variability: Moderate           Accelerations: Yes           Decelerations: None            Baseline: 140 BPM           Uterine Irritability: No           Contractions: Irregular                    Contraction Frequency: x3                   Acoustic Stimulator: No           Nonstress Test Interpretation: Reactive           Nonstress Test Second Interpretation: Reactive                     Additional Comments      Physician Evaluation      NST Interpretation: Reactive    Disposition:   Discharged    Comments:        Meaghan Rock DO

## 2024-06-19 NOTE — DISCHARGE INSTRUCTIONS
Discharge Instructions    Diet: Diet as tolerated. Drink 8-10 glasses of water daily  Activity: Normal activity         General Instructions    Call your OB doctor if: Fluid leaking from your vagina;Uterine contractions increasing in intensity and frequency;Vaginal bleeding;Vaginal or rectal pressure;Uterine contractions 10 minutes or closer for 1 to 2 hours;Decrease in fetal movement;Temperature greater than 100F

## 2024-06-21 ENCOUNTER — OFFICE VISIT (OUTPATIENT)
Dept: PERINATAL CARE | Facility: HOSPITAL | Age: 34
End: 2024-06-21
Attending: OBSTETRICS & GYNECOLOGY

## 2024-06-21 ENCOUNTER — ORDERS FOR ADMISSION (OUTPATIENT)
Dept: PERINATAL CARE | Facility: HOSPITAL | Age: 34
End: 2024-06-21

## 2024-06-21 VITALS
DIASTOLIC BLOOD PRESSURE: 96 MMHG | HEIGHT: 64 IN | BODY MASS INDEX: 41.32 KG/M2 | WEIGHT: 242 LBS | HEART RATE: 91 BPM | SYSTOLIC BLOOD PRESSURE: 137 MMHG

## 2024-06-21 DIAGNOSIS — O99.210 OBESITY AFFECTING PREGNANCY (HCC): ICD-10-CM

## 2024-06-21 DIAGNOSIS — E66.01 SEVERE OBESITY DUE TO EXCESS CALORIES AFFECTING PREGNANCY IN THIRD TRIMESTER (HCC): ICD-10-CM

## 2024-06-21 DIAGNOSIS — O24.414 INSULIN CONTROLLED GESTATIONAL DIABETES MELLITUS (GDM) IN THIRD TRIMESTER (HCC): ICD-10-CM

## 2024-06-21 DIAGNOSIS — O24.419 GDM (GESTATIONAL DIABETES MELLITUS) (HCC): ICD-10-CM

## 2024-06-21 DIAGNOSIS — O99.213 SEVERE OBESITY DUE TO EXCESS CALORIES AFFECTING PREGNANCY IN THIRD TRIMESTER (HCC): ICD-10-CM

## 2024-06-21 DIAGNOSIS — O24.419 GDM (GESTATIONAL DIABETES MELLITUS) (HCC): Primary | ICD-10-CM

## 2024-06-21 DIAGNOSIS — O11.9: ICD-10-CM

## 2024-06-21 DIAGNOSIS — I10 CHRONIC HYPERTENSION: ICD-10-CM

## 2024-06-21 PROCEDURE — 76816 OB US FOLLOW-UP PER FETUS: CPT | Performed by: OBSTETRICS & GYNECOLOGY

## 2024-06-21 PROCEDURE — 76819 FETAL BIOPHYS PROFIL W/O NST: CPT

## 2024-06-21 RX ORDER — DEXTROSE, SODIUM CHLORIDE, SODIUM LACTATE, POTASSIUM CHLORIDE, AND CALCIUM CHLORIDE 5; .6; .31; .03; .02 G/100ML; G/100ML; G/100ML; G/100ML; G/100ML
50 INJECTION, SOLUTION INTRAVENOUS CONTINUOUS PRN
OUTPATIENT
Start: 2024-06-21

## 2024-06-21 RX ORDER — SODIUM CHLORIDE, SODIUM LACTATE, POTASSIUM CHLORIDE, CALCIUM CHLORIDE 600; 310; 30; 20 MG/100ML; MG/100ML; MG/100ML; MG/100ML
INJECTION, SOLUTION INTRAVENOUS CONTINUOUS PRN
OUTPATIENT
Start: 2024-06-21

## 2024-06-21 RX ORDER — SODIUM CHLORIDE 9 MG/ML
25 INJECTION, SOLUTION INTRAVENOUS CONTINUOUS PRN
OUTPATIENT
Start: 2024-06-21

## 2024-06-21 RX ORDER — NICOTINE POLACRILEX 4 MG
15 LOZENGE BUCCAL
OUTPATIENT
Start: 2024-06-21

## 2024-06-21 RX ORDER — NICOTINE POLACRILEX 4 MG
30 LOZENGE BUCCAL
OUTPATIENT
Start: 2024-06-21

## 2024-06-21 RX ORDER — DEXTROSE MONOHYDRATE 25 G/50ML
50 INJECTION, SOLUTION INTRAVENOUS
OUTPATIENT
Start: 2024-06-21

## 2024-06-21 RX ORDER — INSULIN DEGLUDEC 100 U/ML
60 INJECTION, SOLUTION SUBCUTANEOUS ONCE
OUTPATIENT
Start: 2024-07-01

## 2024-06-21 NOTE — PROGRESS NOTES
Pt here for growth ultrasound  + FM  Pt states feeling occas BH ctx, denies vag bleeding and leaking fluid.  Pt denies headaches, visual disturbances and epigastric pain.

## 2024-06-22 DIAGNOSIS — O21.9 NAUSEA AND VOMITING OF PREGNANCY, ANTEPARTUM (HCC): ICD-10-CM

## 2024-06-23 PROBLEM — N89.8 VAGINAL DISCHARGE: Status: ACTIVE | Noted: 2024-06-23

## 2024-06-24 ENCOUNTER — OFFICE VISIT (OUTPATIENT)
Dept: PERINATAL CARE | Facility: HOSPITAL | Age: 34
End: 2024-06-24
Attending: OBSTETRICS & GYNECOLOGY

## 2024-06-24 VITALS
BODY MASS INDEX: 41.48 KG/M2 | HEIGHT: 64 IN | DIASTOLIC BLOOD PRESSURE: 88 MMHG | HEART RATE: 111 BPM | WEIGHT: 243 LBS | SYSTOLIC BLOOD PRESSURE: 131 MMHG

## 2024-06-24 DIAGNOSIS — E66.01 SEVERE OBESITY DUE TO EXCESS CALORIES AFFECTING PREGNANCY IN THIRD TRIMESTER (HCC): ICD-10-CM

## 2024-06-24 DIAGNOSIS — O99.210 OBESITY AFFECTING PREGNANCY (HCC): ICD-10-CM

## 2024-06-24 DIAGNOSIS — O24.414 INSULIN CONTROLLED GESTATIONAL DIABETES MELLITUS (GDM) IN THIRD TRIMESTER (HCC): Primary | ICD-10-CM

## 2024-06-24 DIAGNOSIS — O24.414 INSULIN CONTROLLED GESTATIONAL DIABETES MELLITUS (GDM) IN THIRD TRIMESTER (HCC): ICD-10-CM

## 2024-06-24 DIAGNOSIS — O99.213 SEVERE OBESITY DUE TO EXCESS CALORIES AFFECTING PREGNANCY IN THIRD TRIMESTER (HCC): ICD-10-CM

## 2024-06-24 DIAGNOSIS — I10 CHRONIC HYPERTENSION: ICD-10-CM

## 2024-06-24 PROCEDURE — 76819 FETAL BIOPHYS PROFIL W/O NST: CPT | Performed by: OBSTETRICS & GYNECOLOGY

## 2024-06-24 PROCEDURE — 76815 OB US LIMITED FETUS(S): CPT

## 2024-06-24 RX ORDER — FAMOTIDINE 20 MG/1
20 TABLET, FILM COATED ORAL 2 TIMES DAILY
Qty: 180 TABLET | Refills: 0 | Status: SHIPPED | OUTPATIENT
Start: 2024-06-24

## 2024-06-24 NOTE — PROGRESS NOTES
Outpatient Maternal-Fetal Medicine Follow-Up     Dear Dr. Casanova     Thank you for requesting an ultrasound and maternal-fetal medicine consultation on your patient Tammy Ramirez.  As you are aware she is a 33 year old female  with a dillon pregnancy and an Estimated Date of Delivery: 24.  She returned to maternal-fetal medicine today for a follow-up visit.  Her history was reviewed from her prior visit and there were no interval changes.  She was admitted last week and diagnosed with preeclampsia     Antepartum Risk Factors  Class II obesity complicating pregnancy  Chronic migraines  Hypertension related to severe migraine  GDM A2  Preeclampsia without severe features     S:  No s/sx of severe preeclampsia.  Good FM.        PHYSICAL EXAMINATION:  /88 (BP Location: Right arm, Patient Position: Sitting, Cuff Size: large)   Pulse 111   Ht 5' 4\" (1.626 m)   Wt 243 lb (110.2 kg)   LMP 2023 (Exact Date)   BMI 41.71 kg/m²   General: alert and oriented, no acute distress  Abdomen: obese, gravid, soft, non-tender  Extremities: non-tender, no edema    DISCUSSION  During her visit we discussed and reviewed the following issues:  OBESITY  See prior MFM notes for a detailed review.     MIGRAINES  See prior [facility] notes for a detailed review.     GESTATIONAL DIABETES- follow up  We reviewed the potential implications and risks associated with GDM to her and her fetus, especially when poorly controlled. We discussed the increased incidence of macrosomia and the potential for related birth injury to her and her baby. We talked about the increase risks associated risk of fetal hyperinsulinemia, jaundice, electrolyte imbalance, seizure activity, IUFD and other adverse obstetric outcomes. We also reviewed her  increased risk of having diabetes later in life. The importance of good glycemic control and avoidance of prolonged hypoglycemia and hyperglycemia was addressed.  See MFM note from 3/1/2024 for  detailed review     Her capillary blood glucose records were reviewed today; BS controlled   Her overall glucose control is good     She is taking Lantus 80 units nightly        Insulin dose:  Lantus 80units nightly which she was advised to continue.     I answered her questions regarding timing of delivery and  testing.     PREECLAMPSIA    BP Review  Anti-Hypertensive medications:  Labetalol 100 mg two times daily  Home BP's: 130's/70-80's  Her blood pressure control is adequate.  Medical Regimen Recommendation: no change.  See prior M notes for a detailed review.      OB ULTRASOUND REPORT   See imaging tab for complete ultrasound report or in PACS    Single IUP in cephalic presentation.    Placenta is anterior.   Cardiac activity is present at 135 bpm  MVP is 3.9 cm . DEBBIE 13.9 cm      BIOPHYSICAL PROFILE:  Movement:    2/2  Tone:            2/2  Breathin/2  Fluid:             2/2  TOTAL:               IMPRESSION:  IUP at 36w0d  Normal fetal growth and BPP  Class II obesity complicating pregnancy  Chronic migraines  Hypertension, chronic with superimposed preeclampsia without severe features  GDM A2  Preeclampsia without severe features     RECOMMENDATIONS:  Continue care with Dr. Casanova  Continue insulin  Weekly Maternal-Fetal Medicine review of capillary blood glucose values  Twice-weekly antepartum monitoring consisting of weekly NSTs and weekly BPP's  Delivery at 37 weeks gestation or sooner should severe features of preeclampsia or gestational hypertension develop     Total time spent 20 minutes this calendar day which includes preparing to see the patient including chart review, obtaining and/or reviewing additional medical history, performing a physical exam and evaluation, documenting clinical information in the electronic medical record, independently interpreting results, counseling the patient, communicating results to the patient/family/caregiver and coordinating care.     Case  discussed with patient who demonstrated understanding and agreement with plan.     Thank you for allowing me to participate in the care of this patient.  Please feel free to contact me with any questions.    Aamir Palacio D.O.  Maternal Fetal Medicine        Note to patient and family  The 21st Century Cures Act makes medical notes available to patients in the interest of transparency.  However, please be advised that this is a medical document.  It is intended as [id number] communication.  It is written and medical language may contain abbreviations or verbiage that are technical and unfamiliar.  It may appear blunt or direct.  Medical documents are intended to carry relevant information, facts as evident, and the clinical opinion of the practitioner.

## 2024-06-27 ENCOUNTER — ROUTINE PRENATAL (OUTPATIENT)
Facility: CLINIC | Age: 34
End: 2024-06-27

## 2024-06-27 VITALS
SYSTOLIC BLOOD PRESSURE: 138 MMHG | HEART RATE: 119 BPM | WEIGHT: 247.81 LBS | DIASTOLIC BLOOD PRESSURE: 78 MMHG | HEIGHT: 64 IN | BODY MASS INDEX: 42.3 KG/M2

## 2024-06-27 DIAGNOSIS — E66.09 OTHER OBESITY DUE TO EXCESS CALORIES AFFECTING PREGNANCY IN SECOND TRIMESTER (HCC): ICD-10-CM

## 2024-06-27 DIAGNOSIS — O10.919 CHRONIC HYPERTENSION AFFECTING PREGNANCY (HCC): ICD-10-CM

## 2024-06-27 DIAGNOSIS — O99.212 OTHER OBESITY DUE TO EXCESS CALORIES AFFECTING PREGNANCY IN SECOND TRIMESTER (HCC): ICD-10-CM

## 2024-06-27 DIAGNOSIS — Z34.03 ENCOUNTER FOR SUPERVISION OF NORMAL FIRST PREGNANCY IN THIRD TRIMESTER (HCC): Primary | ICD-10-CM

## 2024-06-27 DIAGNOSIS — Z3A.36 36 WEEKS GESTATION OF PREGNANCY (HCC): ICD-10-CM

## 2024-06-27 PROCEDURE — 87150 DNA/RNA AMPLIFIED PROBE: CPT | Performed by: OBSTETRICS & GYNECOLOGY

## 2024-06-27 PROCEDURE — 99214 OFFICE O/P EST MOD 30 MIN: CPT | Performed by: OBSTETRICS & GYNECOLOGY

## 2024-06-27 PROCEDURE — 87081 CULTURE SCREEN ONLY: CPT | Performed by: OBSTETRICS & GYNECOLOGY

## 2024-06-27 NOTE — PROGRESS NOTES
Patient has no complaints  - GBS done    NST reactive     IOL 37 wk scheduled 7/1/24 cytotec    FRANCIS 7/19/24 by 6w3d US not c/w LMP      GIRL  -NIPS neg, NT normal.    -carrier neg   -AFP low risk    -Flu 23-24 - declines   -2023 COVID-19 booster encouraged   -CBC, 3rd tri HIV and T Pallidum discussed - done   -TDAP - declined  -classes, pre-registration, pediatrician (Dr. Long), breast pump, car seat discussed   -H/o spinal headaches after lumbar puncture     Chronic Hypertension - started on labetalol at 17 wk. NOW SUPERIMPOSED PREECLAMPSIA as OF 5/31  -Never formally diagnosed with CHTN, but multiple high readings prior to pregnancy (per EMR since at least 2018). See previous OB notes. Hypertensive even at 6 wk  -aspirin recommended - taking 81 mg tab x 2 daily   -Baseline EKG - 1/21 - ABNORMAL. Cannot rule out anterior infarct   -Baseline CMP & urine P/C normal. TSH 0.235 (L) - 12/24  -1/24/24 - /90 - 14w5d (NO CURRENT HA.) Patient bp at home 120's-130's/70-80's  -Recommend surveillance as if chronic HTN - reviewed potential for placental damage, fetal growth restriction, stillbirth. M Dr. Rosado in agreement    -BP monitoring at home   -2/7/24 /98 - 16w5d - Rx labetalol 100 mg BID   -L2 US normal   -Growth US q 4 wk in 3rd trim (with BPP at or beyond 32 wk) - ordered   Appt  4/26: EFW 67%ile, vertex, DEBBIE 18   Appt 5/24  -EFW 2146 g ( 4 lb 12 oz); 65%.    -Weekly NSTs at 32 weeks; twice weekly NST's at 34 wk  -pt observed on L&D 5/29-31 for elevated Bps and new proteinuria, received bmtz  Weekly BPP - has appts with MFM  Weekly preE labs  DELIVERY 37wk - will schedule for cytotec IOL     ABNORMAL EKG - 1/21/24  -1/21/24 - EKG report reads cannot rule out anterior infarct. Appears changed from 1/13/23 EKG.   -Cardiology 2/1/24 Dr. Martinez.                Dr. Martinez says 1/21/24 EKG was just low voltage in lead V3, no infarct  2/1/24 EKG - NSR, right axis deviation, no ischemia, borderline  2/19/24  Holter monitor x 1 week - NSR (HR  bpm, mean 92). No Afib  3/13/24 TTE at 21+ wk LVEF 60-65%. No wall motion abnormality.   No significant valvular stenosis or regurgitation.   -appt  3/19 to discuss results of Holter & Echo with cardiologist.  - plan follow up appointment 2 months post partum        GDM on insulin per MFM  -Dx 16 wk -> insulin by 20 wk   -Diabetes education done 2/12/24. Follow up visit 3/4/24   -3/6/24 Lantus 24 units HS per MFM - now 80U at bedtime as of 5/31  -A1c about once per trimester - order placed.   -growth US & NSTs as above        Obesity (pre preg BMI 36)   -limit weight gain 11-20 lb   -L2US, growth US, NSTs per above     H/o intractable migraines  -onset age 11.  20 or more migraines per month.   -only 4-5 hours of sleep on regular basis   -MRI C spine 2020 - Mild multilevel disc desiccation and bulging without significant loss of intervertebral disc space height. No significant spinal canal or neuroforaminal stenosis.   -MRI brain 2/12/21 - No acute intracranial abnormality.   -MRI c spine 8/21 - unremarkable. Done for left-sided facial and arm   numbness and radiculopathy.   -Past treatments: Botox, Nurtec, Aimovig, Ajovy, Amitriptyline, nortriptyline, propranolol, Depakote, Topamax, venlafaxine, Norco, Fioricet, multiple triptans, flexeril, and OTC analgesics. She has had facet and cervical epidurals as well as SPG block none of which provided much relief. Osteopathic manipulation therapy, PT, chiropractic, massage acupuncture, magnesium, CoQ10, vitamin B complex, elimination diet.   -Botox helped partially in the past   -Neurologist Dr. Robe Gutierrez - currently taking Naratriptan 2.5 mg PRN  -Took Qulipta x 1 week & seemed to be working - stopped when found out she is pregnant.   -per patient, when she notified Dr. Gutierrez she is pregnant, he is no longer comfortable managing her migraine medication.   -Pain management 12/7/23 bilateral occipital neuralgia. Migraines  originate from the occipital region, L>R. Bilateral occipital block, pending insurance approval and OBGyn approval.  Follow-up 2 to 3 weeks thereafter.   -1/10/24 - Bilateral greater occipital nerve block done. 100% improvement for 24 hr.   -1/24/24 - Sleep study ordered - not yet.    -2/2/24 Pain management f/u visit - consider repeat nerve blocks  -3/18 - 22w3d - migraines have improved significantly since around 19 wk (Note: started labetalol at 17 wk). Does not feel will need nerve block again at this time.   -her migraines have improved, takes Naratriptan only when needed     CHEYANNE, insomnia  -meds: none currently. H/o zoloft, clonazepam, venlafaxine   -therapist - not currently. Not very interested. Resources given   -3/18 - 22w3d - Rx hydroxyzine PRN      H/o PVCs  -Fhx Brugada syndrome - she tested negative   -3/31/2021 TTE - LVEF 55-60%. Unremarkable  -Holter monitor evaluation 4/2021 - was told her PVCs are minor (2%) and offered her medications if she wanted it - she declined - feeling fine.  -2/1/24 cardiologist Dr. Martinez. EKG - NSR, right axis deviation, no ischemia, borderline  -2/19/24 Holter monitor x 1 week - normal   -3/13/24 TTE at 21+ wk LVEF 60-65%. normal     Subclinical hyperthyroidism  -Low TSH with normal FT4 & FT3 noted on initial prenatal labs & in 2nd trimester. No treatment needed      Vitamin D deficiency  -1/6/24 Vit D 12.8 at 12 wk   -optimal dosing in pregnancy is controversial, but given starting out very low, Rx 50,000 international units weekly for 8 weeks, then switch to 4000 units daily over the counter in addition to prenatal vitamin      Pap neg, +HPV 18/45 (11/27/23)  -2/7 - 16w5d - colposcopy - cervical biopsy polypoid endocervical glandular tissue - Dr. Rock   -recommend pap & HPV co-testing at 1 year.

## 2024-06-28 LAB — GROUP B STREP BY PCR FOR PCR OVT: NEGATIVE

## 2024-07-01 ENCOUNTER — HOSPITAL ENCOUNTER (INPATIENT)
Facility: HOSPITAL | Age: 34
LOS: 4 days | Discharge: HOME | End: 2024-07-05
Attending: OBSTETRICS & GYNECOLOGY | Admitting: OBSTETRICS & GYNECOLOGY
Payer: MEDICAID

## 2024-07-01 ENCOUNTER — APPOINTMENT (OUTPATIENT)
Dept: OBGYN CLINIC | Facility: HOSPITAL | Age: 34
End: 2024-07-01
Payer: MEDICAID

## 2024-07-01 LAB
ALBUMIN SERPL-MCNC: 2.6 G/DL (ref 3.4–5)
ALBUMIN/GLOB SERPL: 0.6 {RATIO} (ref 1–2)
ALP LIVER SERPL-CCNC: 139 U/L
ALT SERPL-CCNC: 31 U/L
ANION GAP SERPL CALC-SCNC: 9 MMOL/L (ref 0–18)
ANION GAP SERPL CALC-SCNC: 9 MMOL/L (ref 0–18)
ANTIBODY SCREEN: NEGATIVE
AST SERPL-CCNC: 30 U/L (ref 15–37)
BASOPHILS # BLD AUTO: 0.04 X10(3) UL (ref 0–0.2)
BASOPHILS NFR BLD AUTO: 0.3 %
BILIRUB SERPL-MCNC: 0.3 MG/DL (ref 0.1–2)
BUN BLD-MCNC: 8 MG/DL (ref 9–23)
BUN BLD-MCNC: 8 MG/DL (ref 9–23)
CALCIUM BLD-MCNC: 9.5 MG/DL (ref 8.5–10.1)
CALCIUM BLD-MCNC: 9.5 MG/DL (ref 8.5–10.1)
CHLORIDE SERPL-SCNC: 104 MMOL/L (ref 98–112)
CHLORIDE SERPL-SCNC: 104 MMOL/L (ref 98–112)
CO2 SERPL-SCNC: 21 MMOL/L (ref 21–32)
CO2 SERPL-SCNC: 21 MMOL/L (ref 21–32)
CREAT BLD-MCNC: 0.7 MG/DL
CREAT BLD-MCNC: 0.7 MG/DL
CREAT UR-SCNC: 104 MG/DL
EGFRCR SERPLBLD CKD-EPI 2021: 118 ML/MIN/1.73M2 (ref 60–?)
EGFRCR SERPLBLD CKD-EPI 2021: 118 ML/MIN/1.73M2 (ref 60–?)
EOSINOPHIL # BLD AUTO: 0.05 X10(3) UL (ref 0–0.7)
EOSINOPHIL NFR BLD AUTO: 0.4 %
ERYTHROCYTE [DISTWIDTH] IN BLOOD BY AUTOMATED COUNT: 14.2 %
GLOBULIN PLAS-MCNC: 4.2 G/DL (ref 2.8–4.4)
GLUCOSE BLD-MCNC: 96 MG/DL (ref 70–99)
GLUCOSE BLD-MCNC: 96 MG/DL (ref 70–99)
HCT VFR BLD AUTO: 34.4 %
HGB BLD-MCNC: 11.6 G/DL
IMM GRANULOCYTES # BLD AUTO: 0.13 X10(3) UL (ref 0–1)
IMM GRANULOCYTES NFR BLD: 1 %
LYMPHOCYTES # BLD AUTO: 2.15 X10(3) UL (ref 1–4)
LYMPHOCYTES NFR BLD AUTO: 16.4 %
MCH RBC QN AUTO: 29.1 PG (ref 26–34)
MCHC RBC AUTO-ENTMCNC: 33.7 G/DL (ref 31–37)
MCV RBC AUTO: 86.2 FL
MONOCYTES # BLD AUTO: 0.77 X10(3) UL (ref 0.1–1)
MONOCYTES NFR BLD AUTO: 5.9 %
NEUTROPHILS # BLD AUTO: 10 X10 (3) UL (ref 1.5–7.7)
NEUTROPHILS # BLD AUTO: 10 X10(3) UL (ref 1.5–7.7)
NEUTROPHILS NFR BLD AUTO: 76 %
OSMOLALITY SERPL CALC.SUM OF ELEC: 276 MOSM/KG (ref 275–295)
OSMOLALITY SERPL CALC.SUM OF ELEC: 276 MOSM/KG (ref 275–295)
PLATELET # BLD AUTO: 296 10(3)UL (ref 150–450)
POTASSIUM SERPL-SCNC: 3.8 MMOL/L (ref 3.5–5.1)
POTASSIUM SERPL-SCNC: 3.8 MMOL/L (ref 3.5–5.1)
PROT SERPL-MCNC: 6.8 G/DL (ref 6.4–8.2)
PROT UR-MCNC: 17.2 MG/DL
PROT/CREAT UR-RTO: 0.17
RBC # BLD AUTO: 3.99 X10(6)UL
RH BLOOD TYPE: POSITIVE
SODIUM SERPL-SCNC: 134 MMOL/L (ref 136–145)
SODIUM SERPL-SCNC: 134 MMOL/L (ref 136–145)
T PALLIDUM AB SER QL IA: NONREACTIVE
URATE SERPL-MCNC: 3.7 MG/DL
WBC # BLD AUTO: 13.1 X10(3) UL (ref 4–11)

## 2024-07-01 RX ORDER — CITRIC ACID/SODIUM CITRATE 334-500MG
30 SOLUTION, ORAL ORAL AS NEEDED
Status: DISCONTINUED | OUTPATIENT
Start: 2024-07-01 | End: 2024-07-02

## 2024-07-01 RX ORDER — SODIUM CHLORIDE, SODIUM LACTATE, POTASSIUM CHLORIDE, CALCIUM CHLORIDE 600; 310; 30; 20 MG/100ML; MG/100ML; MG/100ML; MG/100ML
INJECTION, SOLUTION INTRAVENOUS CONTINUOUS
Status: DISCONTINUED | OUTPATIENT
Start: 2024-07-01 | End: 2024-07-02

## 2024-07-01 RX ORDER — IBUPROFEN 600 MG/1
600 TABLET ORAL ONCE AS NEEDED
Status: DISCONTINUED | OUTPATIENT
Start: 2024-07-01 | End: 2024-07-02

## 2024-07-01 RX ORDER — SODIUM CHLORIDE, SODIUM LACTATE, POTASSIUM CHLORIDE, CALCIUM CHLORIDE 600; 310; 30; 20 MG/100ML; MG/100ML; MG/100ML; MG/100ML
INJECTION, SOLUTION INTRAVENOUS CONTINUOUS PRN
Status: DISCONTINUED | OUTPATIENT
Start: 2024-07-01 | End: 2024-07-02

## 2024-07-01 RX ORDER — ONDANSETRON 2 MG/ML
4 INJECTION INTRAMUSCULAR; INTRAVENOUS EVERY 6 HOURS PRN
Status: DISCONTINUED | OUTPATIENT
Start: 2024-07-01 | End: 2024-07-02

## 2024-07-01 RX ORDER — NICOTINE POLACRILEX 4 MG
15 LOZENGE BUCCAL
Status: DISCONTINUED | OUTPATIENT
Start: 2024-07-01 | End: 2024-07-02

## 2024-07-01 RX ORDER — NICOTINE POLACRILEX 4 MG
30 LOZENGE BUCCAL
Status: DISCONTINUED | OUTPATIENT
Start: 2024-07-01 | End: 2024-07-02

## 2024-07-01 RX ORDER — DEXTROSE, SODIUM CHLORIDE, SODIUM LACTATE, POTASSIUM CHLORIDE, AND CALCIUM CHLORIDE 5; .6; .31; .03; .02 G/100ML; G/100ML; G/100ML; G/100ML; G/100ML
50 INJECTION, SOLUTION INTRAVENOUS CONTINUOUS PRN
Status: DISCONTINUED | OUTPATIENT
Start: 2024-07-01 | End: 2024-07-02

## 2024-07-01 RX ORDER — LABETALOL 100 MG/1
100 TABLET, FILM COATED ORAL 2 TIMES DAILY
Status: DISCONTINUED | OUTPATIENT
Start: 2024-07-01 | End: 2024-07-03

## 2024-07-01 RX ORDER — DEXTROSE MONOHYDRATE 25 G/50ML
50 INJECTION, SOLUTION INTRAVENOUS
Status: DISCONTINUED | OUTPATIENT
Start: 2024-07-01 | End: 2024-07-02

## 2024-07-01 RX ORDER — ACETAMINOPHEN 500 MG
500 TABLET ORAL EVERY 6 HOURS PRN
Status: DISCONTINUED | OUTPATIENT
Start: 2024-07-01 | End: 2024-07-02

## 2024-07-01 RX ORDER — TERBUTALINE SULFATE 1 MG/ML
0.25 INJECTION, SOLUTION SUBCUTANEOUS AS NEEDED
Status: DISCONTINUED | OUTPATIENT
Start: 2024-07-01 | End: 2024-07-02

## 2024-07-01 RX ORDER — HYDROMORPHONE HYDROCHLORIDE 1 MG/ML
1 INJECTION, SOLUTION INTRAMUSCULAR; INTRAVENOUS; SUBCUTANEOUS EVERY 2 HOUR PRN
Status: DISCONTINUED | OUTPATIENT
Start: 2024-07-01 | End: 2024-07-02

## 2024-07-01 RX ORDER — DEXTROSE, SODIUM CHLORIDE, SODIUM LACTATE, POTASSIUM CHLORIDE, AND CALCIUM CHLORIDE 5; .6; .31; .03; .02 G/100ML; G/100ML; G/100ML; G/100ML; G/100ML
INJECTION, SOLUTION INTRAVENOUS AS NEEDED
Status: DISCONTINUED | OUTPATIENT
Start: 2024-07-01 | End: 2024-07-02

## 2024-07-01 RX ORDER — ACETAMINOPHEN 500 MG
1000 TABLET ORAL EVERY 6 HOURS PRN
Status: DISCONTINUED | OUTPATIENT
Start: 2024-07-01 | End: 2024-07-02

## 2024-07-01 RX ORDER — INSULIN DEGLUDEC 100 U/ML
60 INJECTION, SOLUTION SUBCUTANEOUS ONCE
Status: COMPLETED | OUTPATIENT
Start: 2024-07-01 | End: 2024-07-01

## 2024-07-01 RX ORDER — SODIUM CHLORIDE 9 MG/ML
25 INJECTION, SOLUTION INTRAVENOUS CONTINUOUS PRN
Status: DISCONTINUED | OUTPATIENT
Start: 2024-07-01 | End: 2024-07-02

## 2024-07-02 ENCOUNTER — ANESTHESIA EVENT (OUTPATIENT)
Dept: OBGYN UNIT | Facility: HOSPITAL | Age: 34
End: 2024-07-02
Payer: MEDICAID

## 2024-07-02 ENCOUNTER — ANESTHESIA (OUTPATIENT)
Dept: OBGYN UNIT | Facility: HOSPITAL | Age: 34
End: 2024-07-02
Payer: MEDICAID

## 2024-07-02 PROBLEM — O11.9 CHRONIC HYPERTENSION WITH SUPERIMPOSED PREECLAMPSIA (HCC): Status: ACTIVE | Noted: 2024-05-30

## 2024-07-02 LAB
GLUCOSE BLD-MCNC: 106 MG/DL (ref 70–99)
GLUCOSE BLD-MCNC: 71 MG/DL (ref 70–99)
GLUCOSE BLD-MCNC: 75 MG/DL (ref 70–99)
GLUCOSE BLD-MCNC: 77 MG/DL (ref 70–99)
GLUCOSE BLD-MCNC: 77 MG/DL (ref 70–99)
GLUCOSE BLD-MCNC: 79 MG/DL (ref 70–99)
GLUCOSE BLD-MCNC: 81 MG/DL (ref 70–99)
GLUCOSE BLD-MCNC: 82 MG/DL (ref 70–99)
GLUCOSE BLD-MCNC: 82 MG/DL (ref 70–99)
GLUCOSE BLD-MCNC: 83 MG/DL (ref 70–99)
GLUCOSE BLD-MCNC: 83 MG/DL (ref 70–99)
GLUCOSE BLD-MCNC: 84 MG/DL (ref 70–99)

## 2024-07-02 PROCEDURE — 0HQ9XZZ REPAIR PERINEUM SKIN, EXTERNAL APPROACH: ICD-10-PCS | Performed by: STUDENT IN AN ORGANIZED HEALTH CARE EDUCATION/TRAINING PROGRAM

## 2024-07-02 PROCEDURE — 3E033VJ INTRODUCTION OF OTHER HORMONE INTO PERIPHERAL VEIN, PERCUTANEOUS APPROACH: ICD-10-PCS | Performed by: STUDENT IN AN ORGANIZED HEALTH CARE EDUCATION/TRAINING PROGRAM

## 2024-07-02 PROCEDURE — 0UQKXZZ REPAIR HYMEN, EXTERNAL APPROACH: ICD-10-PCS | Performed by: STUDENT IN AN ORGANIZED HEALTH CARE EDUCATION/TRAINING PROGRAM

## 2024-07-02 PROCEDURE — 3E0DXGC INTRODUCTION OF OTHER THERAPEUTIC SUBSTANCE INTO MOUTH AND PHARYNX, EXTERNAL APPROACH: ICD-10-PCS | Performed by: STUDENT IN AN ORGANIZED HEALTH CARE EDUCATION/TRAINING PROGRAM

## 2024-07-02 PROCEDURE — 59409 OBSTETRICAL CARE: CPT | Performed by: STUDENT IN AN ORGANIZED HEALTH CARE EDUCATION/TRAINING PROGRAM

## 2024-07-02 RX ORDER — SIMETHICONE 80 MG
80 TABLET,CHEWABLE ORAL 3 TIMES DAILY PRN
Status: DISCONTINUED | OUTPATIENT
Start: 2024-07-02 | End: 2024-07-06

## 2024-07-02 RX ORDER — IBUPROFEN 600 MG/1
600 TABLET ORAL EVERY 6 HOURS
Status: DISCONTINUED | OUTPATIENT
Start: 2024-07-02 | End: 2024-07-06

## 2024-07-02 RX ORDER — BISACODYL 10 MG
10 SUPPOSITORY, RECTAL RECTAL ONCE AS NEEDED
Status: DISCONTINUED | OUTPATIENT
Start: 2024-07-02 | End: 2024-07-06

## 2024-07-02 RX ORDER — LIDOCAINE HYDROCHLORIDE AND EPINEPHRINE 15; 5 MG/ML; UG/ML
INJECTION, SOLUTION EPIDURAL AS NEEDED
Status: DISCONTINUED | OUTPATIENT
Start: 2024-07-02 | End: 2024-07-02 | Stop reason: SURG

## 2024-07-02 RX ORDER — CHOLECALCIFEROL (VITAMIN D3) 25 MCG
1 TABLET,CHEWABLE ORAL DAILY
Status: DISCONTINUED | OUTPATIENT
Start: 2024-07-02 | End: 2024-07-06

## 2024-07-02 RX ORDER — BUPIVACAINE HCL/0.9 % NACL/PF 0.25 %
5 PLASTIC BAG, INJECTION (ML) EPIDURAL AS NEEDED
Status: DISCONTINUED | OUTPATIENT
Start: 2024-07-02 | End: 2024-07-02

## 2024-07-02 RX ORDER — DOCUSATE SODIUM 100 MG/1
100 CAPSULE, LIQUID FILLED ORAL
Status: DISCONTINUED | OUTPATIENT
Start: 2024-07-02 | End: 2024-07-06

## 2024-07-02 RX ORDER — ACETAMINOPHEN 500 MG
1000 TABLET ORAL EVERY 6 HOURS PRN
Status: DISCONTINUED | OUTPATIENT
Start: 2024-07-02 | End: 2024-07-06

## 2024-07-02 RX ORDER — NICOTINE POLACRILEX 4 MG
15 LOZENGE BUCCAL
Status: DISCONTINUED | OUTPATIENT
Start: 2024-07-02 | End: 2024-07-04

## 2024-07-02 RX ORDER — ACETAMINOPHEN 500 MG
500 TABLET ORAL EVERY 6 HOURS PRN
Status: DISCONTINUED | OUTPATIENT
Start: 2024-07-02 | End: 2024-07-06

## 2024-07-02 RX ORDER — DEXTROSE MONOHYDRATE 25 G/50ML
50 INJECTION, SOLUTION INTRAVENOUS
Status: DISCONTINUED | OUTPATIENT
Start: 2024-07-02 | End: 2024-07-04

## 2024-07-02 RX ORDER — NICOTINE POLACRILEX 4 MG
30 LOZENGE BUCCAL
Status: DISCONTINUED | OUTPATIENT
Start: 2024-07-02 | End: 2024-07-04

## 2024-07-02 RX ORDER — NALBUPHINE HYDROCHLORIDE 10 MG/ML
2.5 INJECTION, SOLUTION INTRAMUSCULAR; INTRAVENOUS; SUBCUTANEOUS
Status: DISCONTINUED | OUTPATIENT
Start: 2024-07-02 | End: 2024-07-02

## 2024-07-02 RX ADMIN — LIDOCAINE HYDROCHLORIDE AND EPINEPHRINE 3 ML: 15; 5 INJECTION, SOLUTION EPIDURAL at 10:05:00

## 2024-07-02 NOTE — PLAN OF CARE
Problem: BIRTH - VAGINAL/ SECTION  Goal: Fetal and maternal status remain reassuring during the birth process  Description: INTERVENTIONS:  - Monitor vital signs  - Monitor fetal heart rate  - Monitor uterine activity  - Monitor labor progression (vaginal delivery)  - DVT prophylaxis (C/S delivery)  - Surgical antibiotic prophylaxis (C/S delivery)  Outcome: Progressing     Problem: PAIN - ADULT  Goal: Verbalizes/displays adequate comfort level or patient's stated pain goal  Description: INTERVENTIONS:  - Encourage pt to monitor pain and request assistance  - Assess pain using appropriate pain scale  - Administer analgesics based on type and severity of pain and evaluate response  - Implement non-pharmacological measures as appropriate and evaluate response  - Consider cultural and social influences on pain and pain management  - Manage/alleviate anxiety  - Utilize distraction and/or relaxation techniques  - Monitor for opioid side effects  - Notify MD/LIP if interventions unsuccessful or patient reports new pain  - Anticipate increased pain with activity and pre-medicate as appropriate  Outcome: Progressing     Problem: ANXIETY  Goal: Will report anxiety at manageable levels  Description: INTERVENTIONS:  - Administer medication as ordered  - Teach and rehearse alternative coping skills  - Provide emotional support with 1:1 interaction with staff  Outcome: Progressing     Problem: Patient/Family Goals  Goal: Patient/Family Long Term Goal  Description: Patient's Long Term Goal: Uncomplicated vaginal delivery    Interventions:  VS per protocol  I&O  Ice chips and sips as tolerated  EFM per protocol  Maintain IV as ordered  Antibiotics as needed per protocol  Informed consent    Outcome: Progressing  Goal: Patient/Family Short Term Goal  Description: Patient's Short Term Goal: Adequate pain control with delivery of infant  Interventions:  Pain assessment scores as ordered  Patient scores pain a \"3\" or  less  Multidisciplinary care   Nonpharmacologic comfort measures      Outcome: Progressing

## 2024-07-02 NOTE — ANESTHESIA PREPROCEDURE EVALUATION
PRE-OP EVALUATION    Patient Name: Tammy Ramirez    Admit Diagnosis: Pregnancy (HCC) [Z34.90]    Pre-op Diagnosis: * No surgery found *        Anesthesia Procedure: LABOR ANALGESIA    * Surgery not found *    Pre-op vitals reviewed.  Temp: 97.9 °F (36.6 °C)  Pulse: 99  BP: 160/89     Body mass index is 42.4 kg/m².    Current medications reviewed.  Hospital Medications:   lactated ringers IV bolus 1,000 mL  1,000 mL Intravenous Once    fentaNYL-bupivacaine 2 mcg/mL-0.125% in sodium chloride 0.9% 100 mL EPIDURAL infusion premix  12 mL/hr Epidural Continuous    fentaNYL (Sublimaze) 50 mcg/mL injection 100 mcg  100 mcg Epidural Once    EPHEDrine (PF) 25 MG/5 ML injection 5 mg  5 mg Intravenous PRN    nalbuphine (Nubain) 10 mg/mL injection 2.5 mg  2.5 mg Intravenous Q15 Min PRN    lactated ringers infusion   Intravenous Continuous    dextrose in lactated ringers 5% infusion   Intravenous PRN    lactated ringers IV bolus 500 mL  500 mL Intravenous PRN    acetaminophen (Tylenol Extra Strength) tab 500 mg  500 mg Oral Q6H PRN    acetaminophen (Tylenol Extra Strength) tab 1,000 mg  1,000 mg Oral Q6H PRN    ibuprofen (Motrin) tab 600 mg  600 mg Oral Once PRN    ondansetron (Zofran) 4 MG/2ML injection 4 mg  4 mg Intravenous Q6H PRN    oxyTOCIN in sodium chloride 0.9% (Pitocin) 30 Units/500mL infusion premix  62.5-900 reese-units/min Intravenous Continuous    terbutaline (Brethine) 1 MG/ML injection 0.25 mg  0.25 mg Subcutaneous PRN    sodium citrate-citric acid (Bicitra) 500-334 MG/5ML oral solution 30 mL  30 mL Oral PRN    [COMPLETED] misoprostol (CYTOTEC) partial tablets 25 mcg  25 mcg Vaginal 6 times per day    oxyTOCIN in sodium chloride 0.9% (Pitocin) 30 Units/500mL infusion premix  0.5-20 reese-units/min Intravenous Continuous    [COMPLETED] insulin degludec (Tresiba) 100 units/mL flextouch 60 Units  60 Units Subcutaneous Once    dextrose in lactated ringers 5% infusion  50 mL/hr Intravenous Continuous PRN    lactated  ringers infusion   mL/hr Intravenous Continuous PRN    sodium chloride 0.9% infusion  25 mL/hr Intravenous Continuous PRN    glucose (Dex4) 15 GM/59ML oral liquid 15 g  15 g Oral Q15 Min PRN    Or    glucose (Glutose) 40% oral gel 15 g  15 g Oral Q15 Min PRN    Or    glucose-vitamin C (Dex-4) chewable tab 4 tablet  4 tablet Oral Q15 Min PRN    Or    dextrose 50% injection 50 mL  50 mL Intravenous Q15 Min PRN    Or    glucose (Dex4) 15 GM/59ML oral liquid 30 g  30 g Oral Q15 Min PRN    Or    glucose (Glutose) 40% oral gel 30 g  30 g Oral Q15 Min PRN    Or    glucose-vitamin C (Dex-4) chewable tab 8 tablet  8 tablet Oral Q15 Min PRN    insulin regular human (Novolin R, Humulin R) 100 Units in sodium chloride 0.9% 100 mL standard infusion (100 mL)  0.5-5.5 Units/hr Intravenous Continuous    labetalol (Normodyne) tab 100 mg  100 mg Oral BID    HYDROmorphone (Dilaudid) 1 MG/ML injection 1 mg  1 mg Intravenous Q2H PRN       Outpatient Medications:     Medications Prior to Admission   Medication Sig Dispense Refill Last Dose    FAMOTIDINE 20 MG Oral Tab TAKE 1 TABLET BY MOUTH TWICE A  tablet 0 Past Week    LABETALOL 100 MG Oral Tab TAKE 1 TABLET BY MOUTH 2 TIMES A DAY 60 tablet 0 7/1/2024    Ferrous Sulfate 325 (65 Fe) MG Oral Tab Take every other day PO at least 4 hours separate from other iron supplement (e.g. prenatal vitamin) 90 tablet 3 6/30/2024    insulin glargine (LANTUS SOLOSTAR) 100 UNIT/ML Subcutaneous Solution Pen-injector Inject 80 Units into the skin at bedtime. Split dose into 2 separate injections of 40 units each. Use a new needle with each injection   6/30/2024    pantoprazole (PROTONIX) 40 MG Oral Tab EC Take 1 tablet (40 mg total) by mouth daily. 30 tablet 11 Past Week    aspirin 81 MG Oral Tab EC Take 2 tablets (162 mg total) by mouth daily.   6/30/2024    Cholecalciferol (VITAMIN D) 50 MCG (2000 UT) Oral Cap Take by mouth 2 (two) times a day.   7/1/2024    hydrOXYzine 25 MG Oral Tab Take  1-2 tablets (25-50 mg total) by mouth 3 (three) times daily as needed for Anxiety (for sleep trouble). 30 tablet 11 6/30/2024    Magnesium 200 MG Oral Tab Take 2 tablets (400 mg total) by mouth daily.   7/1/2024    Prenatal MV-Min-Fe Fum-FA-DHA (PRENATAL 1 OR) Take by mouth.   6/30/2024    Continuous Blood Gluc Sensor (DEXCOM G7 SENSOR) Does not apply Misc 1 each Every 10 days. 3 each 11     Blood Glucose Monitoring Suppl (ONETOUCH VERIO FLEX SYSTEM) w/Device Does not apply Kit 1 each As Directed. 1 kit 0     OneTouch Delica Lancets 30G Does not apply Misc 1 each 6 (six) times daily. 200 each 3     Insulin Pen Needle 31G X 5 MM Does not apply Misc Use a new needle with each injection 100 each 2     pyridoxine 50 MG Oral Tab Take 1 tablet (50 mg total) by mouth daily. (Patient not taking: Reported on 6/14/2024)   Unknown    OneTouch Delica Lancets 30G Does not apply Misc 1 each 4 (four) times daily. 200 each 3     Lancets Does not apply Misc Use to test BG 4x/day. 400 each 2     Glucose Blood (BLOOD GLUCOSE TEST) In Vitro Strip Use to test BG 4x/day 400 strip 2     Blood Glucose Monitoring Suppl Does not apply Kit Use to test BG. 1 kit 0        Allergies: Droperidol      Anesthesia Evaluation    Patient summary reviewed.    Anesthetic Complications           GI/Hepatic/Renal    Negative GI/hepatic/renal ROS.                             Cardiovascular        Exercise tolerance: good     MET: >4    (+) obesity  (+) hypertension                                     Endo/Other      (+) diabetes                            Pulmonary    Negative pulmonary ROS.                       Neuro/Psych        (+) anxiety         (+) neuromuscular disease  (+) headaches                   Past Surgical History:   Procedure Laterality Date    Colposcopy, cervix w/upper adjacent vagina; w/biopsy(s), cervix  02/07/2024    16w5d - cervical biopsy polypoid endocervical glandular tissue - Dr. Rock     inject anesthetic agent greater  occipital nerve  01/10/2024    Bilateral greater occipital nerve block - Depo-medrol & 1% lidocaine - Dr. Barnes. Done at approx 12 wk gestation    Lumbar puncture/spinal tap(bedside)      mid 20s. Dx meningitis    Other surgical history  07/2021 7/2021: cervical medial branch block C2-C5, seen in ED after procedure for dizziness, left sided facial numbness, worsening headache.    Tonsillectomy       Social History     Socioeconomic History    Marital status: Single   Tobacco Use    Smoking status: Never    Smokeless tobacco: Never   Vaping Use    Vaping status: Never Used   Substance and Sexual Activity    Alcohol use: Never    Drug use: Not Currently     Types: Cannabis     Comment: Recreaction in the past. NOT current.   Other Topics Concern    Caffeine Concern Yes     Comment: soda occ    Exercise No     History   Drug Use Unknown     Comment: Recreaction in the past. NOT current.     Available pre-op labs reviewed.  Lab Results   Component Value Date    WBC 13.1 (H) 07/01/2024    RBC 3.99 07/01/2024    HGB 11.6 (L) 07/01/2024    HCT 34.4 (L) 07/01/2024    MCV 86.2 07/01/2024    MCH 29.1 07/01/2024    MCHC 33.7 07/01/2024    RDW 14.2 07/01/2024    .0 07/01/2024     Lab Results   Component Value Date     (L) 07/01/2024     (L) 07/01/2024    K 3.8 07/01/2024    K 3.8 07/01/2024     07/01/2024     07/01/2024    CO2 21.0 07/01/2024    CO2 21.0 07/01/2024    BUN 8 (L) 07/01/2024    BUN 8 (L) 07/01/2024    CREATSERUM 0.70 07/01/2024    CREATSERUM 0.70 07/01/2024    GLU 96 07/01/2024    GLU 96 07/01/2024    CA 9.5 07/01/2024    CA 9.5 07/01/2024            Airway      Mallampati: II  Mouth opening: >3 FB  TM distance: 4 - 6 cm  Neck ROM: full Cardiovascular    Cardiovascular exam normal.         Dental  Comment: No godwin loose           Pulmonary    Pulmonary exam normal.                 Other findings              ASA: 2   Plan: epidural  NPO status verified and patient meets  guidelines.    Post-procedure pain management plan discussed with surgeon and patient.    Comment: R/b/a of neuraxial anesthesia discussed in detail including bleeding, infection, injury to nerves, partial or incomplete block, and PDPH.     Pt with history of PDPH in setting of spinal tap with severe symptoms requiring epidural blood patch. Discussed pathogenesis of PDPH and general incidence as well as risk factors and both conservative and procedural management.     Pt endorses understanding and wishes to proceed. All questions answered, concerns addressed.      Plan/risks discussed with: patient and spouse      Consented to blood products.          Present on Admission:  **None**

## 2024-07-02 NOTE — H&P
[facility]  Obstetrics and Gynecology  History & Physical    Tammy Ramirez Patient Status:  Inpatient    1990 MRN [id number]   Location [facility] LABOR & DELIVERY Attending Meaghan Rock,    Hospital Day 1 PCP Landon Long MD     CC: Patient is here for scheduled IOL    SUBJECTIVE:    Tammy Ramirez is a 33 year old  female at 37w4d who is being admitted for IOL.   Her current obstetrical history is significant for preeclampsia without severe features superimposed on chronic HTN (elevated 24h urine protein 24), GDM on insulin, BMI 36 pre-pregnancy.     Pt received cytotec overnight, membranes ruptured spontaneously this AM and pt continued having regular painful CTX  Now comfortable with epidural      FRANCIS Confirmation  LMP: Patient's last menstrual period was 2023 (exact date).  FRANCIS: 2024, by Ultrasound       Obstetric History:   OB History    Para Term  AB Living   1         0   SAB IAB Ectopic Multiple Live Births                  # Outcome Date GA Lbr Lamberto/2nd Weight Sex Type Anes PTL Lv   1 Current               Obstetric Comments   Current - Obesity (pre preg BMI 36), chronic hypertension - labetalol started at 17 wk, GDM dx at 16 wk -> on insulin by 20 wk. Intractable migraines  -> improved after starting labetalol. Occipital neuralgia. CHEYANNE, insomnia, H/o PVCs. Subclinical hyperthyroidism, vit D deficiency, pelvic floor dysfunction, Pap NILM +HPV18/45.      Past Medical History:   Past Medical History:    Anemia    Anxiety    Arthritis    Not sure of the date but i have arthritus in my neck    Bilateral occipital neuralgia    Calculus of kidney    About a year and a half ago    Cervical facet syndrome    Cervical high risk HPV (human papillomavirus) test positive    Pap neg, +HPV 18/45 in 1st trimester.    Chronic hypertension    Since at least 2022 per chart review, though patient denies this diagnosis.    Chronic migraine without aura, with  intractable migraine, so stated, with status migrainosus    chronic since age 11    DDD (degenerative disc disease), cervical    MRI C spine 2020 - Mild multilevel disc desiccation and bulging without significant loss of intervertebral disc space height. No significant spinal canal or neuroforaminal stenosis.    Dysmenorrhea    Essential hypertension    CHTN    CHEYANNE (generalized anxiety disorder)    per CareEverywhere    Gestational diabetes (HCC)    Gestational diabetes requiring insulin (HCC)    Dx 16 wk with GDM. On insulin by 20 wk    History of echocardiogram    3/31/2021 TTE - LVEF 55-60%. Unremarkable. Done for h/o palpitations, PVCs    History of echocardiogram    3/13/24 TTE at 21+ wk LVEF 60-65%. No wall motion abnormality.  No significant valvular stenosis or regurgitation.    Insomnia    Meningitis (HCC)    mid 20s. They thought maybe mumps?    Menometrorrhagia    Migraine    Obesity (BMI 30-39.9)    Palpitations    Holter monitor 2021 minor PVCs. 3/2021 Echo LVEF 55-60%, unremarkable.    Pregnancy-induced hypertension (HCC)    Screening for genetic disease carrier status    [employer] carrier screen NEGATIVE    Spinal headache    H/o spinal puncture headaches after epidurals     Past Social History:   Past Surgical History:   Procedure Laterality Date    Colposcopy, cervix w/upper adjacent vagina; w/biopsy(s), cervix  02/07/2024    16w5d - cervical biopsy polypoid endocervical glandular tissue - Dr. Rock     inject anesthetic agent greater occipital nerve  01/10/2024    Bilateral greater occipital nerve block - Depo-medrol & 1% lidocaine - Dr. Barnes. Done at approx 12 wk gestation    Lumbar puncture/spinal tap(bedside)      mid 20s. Dx meningitis    Other surgical history  07/2021 7/2021: cervical medial branch block C2-C5, seen in ED after procedure for dizziness, left sided facial numbness, worsening headache.    Tonsillectomy       Family History:   Family History   Problem Relation Age of Onset     Other (Brugada syndrome) Father         defibrillator    Heart Disorder Father         Atrial flutter    Migraines Father         very difficult to treat    Other (Vasculitis) Father     Genetic Disease Father         Brugada Disease    Dementia Paternal Grandfather     Asthma Paternal Grandmother      Social History:   Social History     Tobacco Use    Smoking status: Never    Smokeless tobacco: Never   Substance Use Topics    Alcohol use: Never       Home Meds:   Medications Prior to Admission   Medication Sig Dispense Refill Last Dose    FAMOTIDINE 20 MG Oral Tab TAKE 1 TABLET BY MOUTH TWICE A  tablet 0 Past Week    LABETALOL 100 MG Oral Tab TAKE 1 TABLET BY MOUTH 2 TIMES A DAY 60 tablet 0 7/1/2024    Ferrous Sulfate 325 (65 Fe) MG Oral Tab Take every other day PO at least 4 hours separate from other iron supplement (e.g. prenatal vitamin) 90 tablet 3 6/30/2024    insulin glargine (LANTUS SOLOSTAR) 100 UNIT/ML Subcutaneous Solution Pen-injector Inject 80 Units into the skin at bedtime. Split dose into 2 separate injections of 40 units each. Use a new needle with each injection   6/30/2024    pantoprazole (PROTONIX) 40 MG Oral Tab EC Take 1 tablet (40 mg total) by mouth daily. 30 tablet 11 Past Week    aspirin 81 MG Oral Tab EC Take 2 tablets (162 mg total) by mouth daily.   6/30/2024    Cholecalciferol (VITAMIN D) 50 MCG (2000 UT) Oral Cap Take by mouth 2 (two) times a day.   7/1/2024    hydrOXYzine 25 MG Oral Tab Take 1-2 tablets (25-50 mg total) by mouth 3 (three) times daily as needed for Anxiety (for sleep trouble). 30 tablet 11 6/30/2024    Magnesium 200 MG Oral Tab Take 2 tablets (400 mg total) by mouth daily.   7/1/2024    Prenatal MV-Min-Fe Fum-FA-DHA (PRENATAL 1 OR) Take by mouth.   6/30/2024    Continuous Blood Gluc Sensor (DEXCOM G7 SENSOR) Does not apply Misc 1 each Every 10 days. 3 each 11     Blood Glucose Monitoring Suppl (ONETOUCH VERIO FLEX SYSTEM) w/Device Does not apply Kit 1 each As  Directed. 1 kit 0     OneTouch Delica Lancets 30G Does not apply Misc 1 each 6 (six) times daily. 200 each 3     Insulin Pen Needle 31G X 5 MM Does not apply Misc Use a new needle with each injection 100 each 2     pyridoxine 50 MG Oral Tab Take 1 tablet (50 mg total) by mouth daily. (Patient not taking: Reported on 2024)   Unknown    OneTouch Delica Lancets 30G Does not apply Misc 1 each 4 (four) times daily. 200 each 3     Lancets Does not apply Misc Use to test BG 4x/day. 400 each 2     Glucose Blood (BLOOD GLUCOSE TEST) In Vitro Strip Use to test BG 4x/day 400 strip 2     Blood Glucose Monitoring Suppl Does not apply Kit Use to test BG. 1 kit 0      Allergies:   Allergies   Allergen Reactions    Droperidol ANXIETY, RESTLESSNESS and Tightness in Chest       OBJECTIVE:    Temp:  [97.9 °F (36.6 °C)-98.5 °F (36.9 °C)] 98.5 °F (36.9 °C)  Pulse:  [] 93  BP: (130-167)/() 130/78  SpO2:  [96 %-99 %] 96 %  Body mass index is 42.4 kg/m².    General: NAD  FHT: 115/mod/+acc/early decels  TOCO: q 2-5 minutes    SVE: 2/70/-2 per RN    Prenatal Labs Brief Review   Blood Type:   Lab Results   Component Value Date    ABO O 2024    RH Positive 2024     GBS:  Negative      Inpatient labs:  Lab Results   Component Value Date    WBC 13.1 2024    HGB 11.6 2024    HCT 34.4 2024    .0 2024    CREATSERUM 0.70 2024    CREATSERUM 0.70 2024    BUN 8 2024    BUN 8 2024     2024     2024    K 3.8 2024    K 3.8 2024     2024     2024    CO2 21.0 2024    CO2 21.0 2024    GLU 96 2024    GLU 96 2024    CA 9.5 2024    CA 9.5 2024    ALB 2.6 2024    ALKPHO 139 2024    BILT 0.3 2024    TP 6.8 2024    AST 30 2024    ALT 31 2024       ASSESSMENT/ PLAN:    Tammy Ramirez is a 33 year old  female at 37w4d Estimated Date of Delivery:  24 who is being admitted for IOL for preeclampsia without severe features superimposed on chronic HTN.      1. Labor: s/p cytotec, s/p SROM, started pitocin  2. Fetal monitoring: CEFM  3. GBS: neg  4. Pain: epidural  5. Preeclampsia, cHTN: serum labs wnl on admission  6. GDM on insulin: mgmt per MFM, sugars so far controlled without insulin drip    Risks, benefits, alternatives and possible complications have been discussed in detail with the patient.  Pre-admission, admission, and post admission procedures and expectations were discussed in detail.  All questions answered, all appropriate consents will be signed at the Hospital.  anticipated.    Samreen Rivera MD

## 2024-07-02 NOTE — ANESTHESIA PROCEDURE NOTES
Labor Analgesia    Date/Time: 7/2/2024 9:57 AM    Performed by: Jason Martins MD  Authorized by: Jason Martins MD      General Information and Staff    Start Time:  7/2/2024 9:57 AM  End Time:  7/2/2024 10:05 AM  Anesthesiologist:  Jason Martins MD  Performed by:  Anesthesiologist  Patient Location:  OB  Site Identification: surface landmarks    Reason for Block: labor epidural    Preanesthetic Checklist: patient identified, IV checked, risks and benefits discussed, monitors and equipment checked, pre-op evaluation, timeout performed, IV bolus, anesthesia consent and sterile technique used      Procedure Details    Patient Position:  Sitting  Prep: ChloraPrep    Monitoring:  Heart rate and continuous pulse ox  Approach:  Midline    Epidural Needle    Injection Technique:  FLOR air  Needle Type:  Tuohy  Needle Gauge:  17 G  Needle Length:  3.375 in  Needle Insertion Depth:  6    Spinal Needle      Catheter    Catheter Type:  End hole  Catheter Size:  19 G  Catheter at Skin Depth:  12  Test Dose:  Negative    Assessment      Additional Comments     3cc lido 1.5%+epi 1:200,000 without significant HD change. Followed by 4cc lido 1% + 100mcg fentanyl and initiation of gtt

## 2024-07-02 NOTE — PROGRESS NOTES
Pt is a 33 year old female admitted to 113/[facility].     Chief Complaint   Patient presents with    Scheduled Induction     Pre-eclampsia      Pt is  37w3d intra-uterine pregnancy.  History obtained, consents signed. Oriented to room, staff, and plan of care.

## 2024-07-03 ENCOUNTER — PATIENT OUTREACH (OUTPATIENT)
Dept: CASE MANAGEMENT | Age: 34
End: 2024-07-03

## 2024-07-03 LAB
BASOPHILS # BLD AUTO: 0.04 X10(3) UL (ref 0–0.2)
BASOPHILS NFR BLD AUTO: 0.3 %
EOSINOPHIL # BLD AUTO: 0.03 X10(3) UL (ref 0–0.7)
EOSINOPHIL NFR BLD AUTO: 0.2 %
ERYTHROCYTE [DISTWIDTH] IN BLOOD BY AUTOMATED COUNT: 14.2 %
GLUCOSE BLD-MCNC: 73 MG/DL (ref 70–99)
HCT VFR BLD AUTO: 32.8 %
HGB BLD-MCNC: 10.8 G/DL
IMM GRANULOCYTES # BLD AUTO: 0.14 X10(3) UL (ref 0–1)
IMM GRANULOCYTES NFR BLD: 1 %
LYMPHOCYTES # BLD AUTO: 1.67 X10(3) UL (ref 1–4)
LYMPHOCYTES NFR BLD AUTO: 11.6 %
MCH RBC QN AUTO: 28.9 PG (ref 26–34)
MCHC RBC AUTO-ENTMCNC: 32.9 G/DL (ref 31–37)
MCV RBC AUTO: 87.7 FL
MONOCYTES # BLD AUTO: 0.91 X10(3) UL (ref 0.1–1)
MONOCYTES NFR BLD AUTO: 6.3 %
NEUTROPHILS # BLD AUTO: 11.61 X10 (3) UL (ref 1.5–7.7)
NEUTROPHILS # BLD AUTO: 11.61 X10(3) UL (ref 1.5–7.7)
NEUTROPHILS NFR BLD AUTO: 80.6 %
PLATELET # BLD AUTO: 262 10(3)UL (ref 150–450)
RBC # BLD AUTO: 3.74 X10(6)UL
WBC # BLD AUTO: 14.4 X10(3) UL (ref 4–11)

## 2024-07-03 RX ORDER — CALCIUM GLUCONATE 94 MG/ML
1 INJECTION, SOLUTION INTRAVENOUS ONCE AS NEEDED
Status: DISCONTINUED | OUTPATIENT
Start: 2024-07-03 | End: 2024-07-06

## 2024-07-03 RX ORDER — SODIUM CHLORIDE, SODIUM LACTATE, POTASSIUM CHLORIDE, CALCIUM CHLORIDE 600; 310; 30; 20 MG/100ML; MG/100ML; MG/100ML; MG/100ML
INJECTION, SOLUTION INTRAVENOUS CONTINUOUS
Status: DISCONTINUED | OUTPATIENT
Start: 2024-07-03 | End: 2024-07-06

## 2024-07-03 RX ORDER — LABETALOL 100 MG/1
100 TABLET, FILM COATED ORAL EVERY 8 HOURS SCHEDULED
Status: DISCONTINUED | OUTPATIENT
Start: 2024-07-04 | End: 2024-07-06

## 2024-07-03 RX ORDER — LABETALOL 100 MG/1
100 TABLET, FILM COATED ORAL EVERY 8 HOURS SCHEDULED
Status: DISCONTINUED | OUTPATIENT
Start: 2024-07-03 | End: 2024-07-03

## 2024-07-03 RX ORDER — CLONAZEPAM 0.25 MG/1
0.25 TABLET, ORALLY DISINTEGRATING ORAL 2 TIMES DAILY
Status: DISCONTINUED | OUTPATIENT
Start: 2024-07-03 | End: 2024-07-03

## 2024-07-03 RX ORDER — CLONAZEPAM 0.25 MG/1
0.25 TABLET, ORALLY DISINTEGRATING ORAL ONCE
Status: COMPLETED | OUTPATIENT
Start: 2024-07-03 | End: 2024-07-03

## 2024-07-03 NOTE — PROGRESS NOTES
Patient with repetitive severe range blood pressures.  Recommend transfer to L&D for 24 hours magnesium for PP preeclampsia with severe features.  Will give additional labetalol 100 mg now and switch to TID

## 2024-07-03 NOTE — PLAN OF CARE
Problem: BIRTH - VAGINAL/ SECTION  Goal: Fetal and maternal status remain reassuring during the birth process  Description: INTERVENTIONS:  - Monitor vital signs  - Monitor fetal heart rate  - Monitor uterine activity  - Monitor labor progression (vaginal delivery)  - DVT prophylaxis (C/S delivery)  - Surgical antibiotic prophylaxis (C/S delivery)  Outcome: Completed     Problem: PAIN - ADULT  Goal: Verbalizes/displays adequate comfort level or patient's stated pain goal  Description: INTERVENTIONS:  - Encourage pt to monitor pain and request assistance  - Assess pain using appropriate pain scale  - Administer analgesics based on type and severity of pain and evaluate response  - Implement non-pharmacological measures as appropriate and evaluate response  - Consider cultural and social influences on pain and pain management  - Manage/alleviate anxiety  - Utilize distraction and/or relaxation techniques  - Monitor for opioid side effects  - Notify MD/LIP if interventions unsuccessful or patient reports new pain  - Anticipate increased pain with activity and pre-medicate as appropriate  Outcome: Completed     Problem: ANXIETY  Goal: Will report anxiety at manageable levels  Description: INTERVENTIONS:  - Administer medication as ordered  - Teach and rehearse alternative coping skills  - Provide emotional support with 1:1 interaction with staff  Outcome: Completed     Problem: Patient/Family Goals  Goal: Patient/Family Long Term Goal  Description: Patient's Long Term Goal: Uncomplicated vaginal delivery    Interventions:  VS per protocol  I&O  Ice chips and sips as tolerated  EFM per protocol  Maintain IV as ordered  Antibiotics as needed per protocol  Informed consent    Outcome: Completed  Goal: Patient/Family Short Term Goal  Description: Patient's Short Term Goal: Adequate pain control with delivery of infant  Interventions:  Pain assessment scores as ordered  Patient scores pain a \"3\" or  less  Multidisciplinary care   Nonpharmacologic comfort measures      Outcome: Completed     Problem: SAFETY ADULT - FALL  Goal: Free from fall injury  Description: INTERVENTIONS:  - Assess pt frequently for physical needs  - Identify cognitive and physical deficits and behaviors that affect risk of falls.  - [place] fall precautions as indicated by assessment.  - Educate pt/family on patient safety including physical limitations  - Instruct pt to call for assistance with activity based on assessment  - Modify environment to reduce risk of injury  - Provide assistive devices as appropriate  - Consider OT/PT consult to assist with strengthening/mobility  - Encourage toileting schedule  Outcome: Completed

## 2024-07-03 NOTE — L&D DELIVERY NOTE
James, Girl [[id number]]      Labor Events     labor?: No   steroids?: Full Course  Antibiotics received during labor?: No  Rupture date/time: 2024 0729     Rupture type: SROM  Fluid color: Clear  Labor type: Induced Onset of Labor  Indications for induction: Chronic Hypertension, Preeclampsia, IDDM/GDDM  Intrapartum & labor complications: Variable decelerations       Labor Event Times    Labor onset date/time: 2024 0930  Dilation complete date/time: 2024 1909       [place] Presentation    Presentation: Vertex  Position: Right Occiput Anterior       Operative Delivery    Operative Vaginal Delivery: No                Shoulder Dystocia    Shoulder Dystocia: No       Anesthesia    Method: Epidural               Delivery      Head delivery date/time: 2024 19:50:10   Delivery date/time:  24 19:50:22   Delivery type: Normal spontaneous vaginal delivery    Details:     Delivery location: delivery room       Delivery Providers    Delivering Clinician: Samreen Rivera MD   Delivery personnel:  Provider Role   Tamika Rivas RN Baby Nurse   [place] Prajapati RN Delivery Nurse             Cord    Vessels: 3 Vessels  Complications: None  Timed cord clamping: Yes  Time in sec: 60  Cord blood disposition: to lab  Gases sent?: No       Resuscitation    Method: None        Measurements      Weight: 3600 g 7 lb 15 oz Length: 53.3 cm     Head circum.: 34 cm Chest circum.: 35 cm      Abdominal circum.: 31.5 cm           Placenta    Date/time: 2024  Removal: Spontaneous  Appearance: Intact  Disposition: Pathology       Apgars    Living status: Living   Apgar Scoring Key:    0 1 2    Skin color Blue or pale Acrocyanotic Completely pink    Heart rate Absent <100 bpm >100 bpm    Reflex irritability No response Grimace Cry or active withdrawal    Muscle tone Limp Some flexion Active motion    Respiratory effort Absent Weak cry; hypoventilation Good, crying               1 Minute:  5 Minute:  10 Minute:  15 Minute:  20 Minute:      Skin color: 1  1       Heart rate: 2  2       Reflex irritablity: 2  2       Muscle tone: 2  2       Respiratory effort: 2  2       Total: 9  9          Apgars assigned by: KIRK FLYNN RN   disposition: with mother       Skin to Skin    Skin to skin initiated date/time: 2024  Skin to skin with: Mother       Vaginal Count    No data filed       Lacerations    Episiotomy: None  Perineal lacerations: None      Vaginal laceration?: Yes Repaired?: Yes     Cervical laceration?: No    Clitoral laceration?: No                33 year old  at 37w4d admitted for IOL for preeclampsia superimposed on medication controlled chronic HTN (without severe features). Pregnancy significant for GDM on insulin, BMI 36 prepregnancy. Received cytotec for cervical ripening. Labor induced with pitocin, membranes ruptured spontaneously, pt progressed to complete. Pushed over ~30min to bring the fetal head to . As the head was delivered the perineum was supported to decrease the risk of tearing.  The shoulders and remainder of the infant followed without difficulty to complete uncomplicated  of vigorous female infant, APGARS 9/9, weight 3600g. The infant was dried and suctioned. Cord clamping delayed and infant placed on maternal abdomen.  Placenta delivered spontaneously, intact. Small vaginal/hymenal laceration repaired with 3-0 vicryl. QBL pending - approx 100 mL.    Samreen Rivera MD

## 2024-07-03 NOTE — PROGRESS NOTES
VAGINAL DELIVERY POSTPARTUM DAY 1    Subjective:   Patient without complaints.  Bleeding normal.  Ambulating without difficulty.  Urinating without difficulty.  No headaches, visual changes or epigastric pain.     Objective:     Vitals:    24 1115   BP: (!) 140/95   Pulse: 104   Resp:    Temp:      Tmax: Temp (24hrs), Av °F (36.7 °C), Min:97.7 °F (36.5 °C), Max:98.2 °F (36.8 °C)    Abdomen- Uterus firm, non-tender, appropriate size  Extremities- Non-tender, no Robson's sign    Data Review:  Recent Labs   Lab 24  2050 24  0801   RBC 3.99 3.74*   HGB 11.6* 10.8*   HCT 34.4* 32.8*   MCV 86.2 87.7   MCH 29.1 28.9   MCHC 33.7 32.9   RDW 14.2 14.2   NEPRELIM 10.00* 11.61*   WBC 13.1* 14.4*   .0 262.0     Assessment/Plan:   Postpartum Day #1 from .  Doing well.  Encouraged ambulation.  Chronic hypertension with superimposed preeclampsia,  no severe features, with elevated protein.  Blood pressures are stable on Labetalol 100 mg BID.  GDMA2- mgt per MFM.    Guy Lara MD  [facility]- Obstetrics & Gynecology

## 2024-07-03 NOTE — PLAN OF CARE
NURSING ADMISSION NOTE      Patient admitted via Wheelchair  Oriented to room.  Safety precautions initiated.  Bed in low position.  Call light in reach.    ID bands checked with LD RN.    Problem: POSTPARTUM  Goal: Long Term Goal:Experiences normal postpartum course  Description: INTERVENTIONS:  - Assess and monitor vital signs and lab values.  - Assess fundus and lochia.  - Provide ice/sitz baths for perineum discomfort.  - Monitor healing of incision/episiotomy/laceration, and assess for signs and symptoms of infection and hematoma.  - Assess bladder function and monitor for bladder distention.  - Provide/instruct/assist with pericare as needed.  - Provide VTE prophylaxis as needed.  - Monitor bowel function.  - Encourage ambulation and provide assistance as needed.  - Assess and monitor emotional status and provide social service/psych resources as needed.  - Utilize standard precautions and use personal protective equipment as indicated. Ensure aseptic care of all intravenous lines and invasive tubes/drains.  - Obtain immunization and exposure to communicable diseases history.  Outcome: Progressing  Goal: Optimize infant feeding at the breast  Description: INTERVENTIONS:  - Initiate breast feeding within first hour after birth.   - Monitor effectiveness of current breast feeding efforts.  - Assess support systems available to mother/family.  - Identify cultural beliefs/practices regarding lactation, letdown techniques, maternal food preferences.  - Assess mother's knowledge and previous experience with breast feeding.  - Provide information as needed about early infant feeding cues (e.g., rooting, lip smacking, sucking fingers/hand) versus late cue of crying.  - Discuss/demonstrate breast feeding aids (e.g., infant sling, nursing footstool/pillows, and breast pumps).  - Encourage mother/other family members to express feelings/concerns, and actively listen.  - Educate father/SO about benefits of breast  feeding and how to manage common lactation challenges.  - Recommend avoidance of specific medications or substances incompatible with breast feeding.  - Assess and monitor for signs of nipple pain/trauma.  - Instruct and provide assistance with proper latch.  - Review techniques for milk expression (breast pumping) and storage of breast milk. Provide pumping equipment/supplies, instructions and assistance, as needed.  - Encourage rooming-in and breast feeding on demand.  - Encourage skin-to-skin contact.  - Provide LC support as needed.  - Assess for and manage engorgement.  - Provide breast feeding education handouts and information on community breast feeding support.   Outcome: Progressing  Goal: Establishment of adequate milk supply with medication/procedure interruptions  Description: INTERVENTIONS:  - Review techniques for milk expression (breast pumping).   - Provide pumping equipment/supplies, instructions, and assistance until it is safe to breastfeed infant.  Outcome: Progressing  Goal: Experiences normal breast weaning course  Description: INTERVENTIONS:  - Assess for and manage engorgement.  - Instruct on breast care.  - Provide comfort measures.  Outcome: Progressing  Goal: Appropriate maternal -  bonding  Description: INTERVENTIONS:  - Assess caregiver- interactions.  - Assess caregiver's emotional status and coping mechanisms.  - Encourage caregiver to participate in  daily care.  - Assess support systems available to mother/family.  - Provide /case management support as needed.  Outcome: Progressing

## 2024-07-03 NOTE — PAYOR COMM NOTE
--------------  ADMISSION REVIEW     Payor: [facility]  Subscriber #:  [id number]  Authorization Number: [id number]    Admit date: 24  Admit time:  8:02 PM       REVIEW DOCUMENTATION:   24     Tmamy Ramirez is a 33 year old  female at 37w4d who is being admitted for IOL.   Her current obstetrical history is significant for preeclampsia without severe features superimposed on chronic HTN (elevated 24h urine protein 24), GDM on insulin, BMI 36 pre-pregnancy.      Pt received cytotec overnight, membranes ruptured spontaneously this AM and pt continued having regular painful CTX  Now comfortable with epidural    Obstetric History:                    OB History    Para Term  AB Living   1         0   SAB IAB Ectopic Multiple Live Births                         # Outcome Date GA Lbr Lamberto/2nd Weight Sex Type Anes PTL Lv   1 Current                         Obstetric Comments   Current - Obesity (pre preg BMI 36), chronic hypertension - labetalol started at 17 wk, GDM dx at 16 wk -> on insulin by 20 wk. Intractable migraines  -> improved after starting labetalol. Occipital neuralgia. CHEYANNE, insomnia, H/o PVCs. Subclinical hyperthyroidism, vit D deficiency, pelvic floor dysfunction,        emp:  [97.9 °F (36.6 °C)-98.5 °F (36.9 °C)] 98.5 °F (36.9 °C)  Pulse:  [] 93  BP: (130-167)/() 130/78  SpO2:  [96 %-99 %] 96 %  Body mass index is 42.4 kg/m².     General: NAD  FHT: 115/mod/+acc/early decels  TOCO: q 2-5 minutes     SVE: 2/70/-2 per RN     Prenatal Labs Brief Review   Blood Type:         Lab Results   Component Value Date     ABO O 2024     RH Positive 2024      GBS:  Negative        Inpatient labs:        Lab Results   Component Value Date     WBC 13.1 2024     HGB 11.6 2024     HCT 34.4 2024     .0 2024     CREATSERUM 0.70 2024     CREATSERUM 0.70 2024     BUN 8 2024     BUN 8 2024       2024      2024     K 3.8 2024     K 3.8 2024      2024      2024     CO2 21.0 2024     CO2 21.0 2024     GLU 96 2024     GLU 96 2024     CA 9.5 2024     CA 9.5 2024     ALB 2.6 2024     ALKPHO 139 2024     BILT 0.3 2024     TP 6.8 2024     AST 30 2024     ALT 31 2024         ASSESSMENT/ PLAN:     Tammy Ramirez is a 33 year old  female at 37w4d Estimated Date of Delivery: 24 who is being admitted for IOL for preeclampsia without severe features superimposed on chronic HTN.        1. Labor: s/p cytotec, s/p SROM, started pitocin  2. Fetal monitoring: CEFM  3. GBS: neg  4. Pain: epidural  5. Preeclampsia, cHTN: serum labs wnl on admission  6. GDM on insulin: mgmt per MFM, sugars so far controlled without insulin drip    Intrapartum & labor complications: Variable decelerations     Head delivery date/time: 2024 19:50:10   Delivery date/time:  24 19:50:22   Delivery type: Normal spontaneous vaginal delivery     Details:      Delivery location: delivery room      FEMALE    [place] Measurements       Weight: 3600 g 7 lb 15 oz Length: 53.3 cm      Head circum.: 34 cm Chest circum.: 35 cm       Abdominal circum.: 31.5 cm              Total: 9  9          MEDICATIONS ADMINISTERED IN LAST 1 DAY:  acetaminophen (Tylenol Extra Strength) tab 1,000 mg       Date Action Dose Route User    7/3/2024 1114 Given 1,000 mg Oral Paradise Gay RN          docusate sodium (Colace) cap 100 mg       Date Action Dose Route User    7/3/2024 0529 Given 100 mg Oral Judy Page RN    2024 2120 Given 100 mg Oral Odessa Prajapati RN          fentaNYL-bupivacaine 2 mcg/mL-0.125% in sodium chloride 0.9% 100 mL EPIDURAL infusion premix       Date Action Dose Route User    2024 1626 New Bag 12 mL/hr Epidural Liya Degroot, RN          ibuprofen (Motrin) tab 600 mg       Date Action Dose  Route User    7/3/2024 1428 Given 600 mg Oral Paradise Gay RN    7/3/2024 0826 Given 600 mg Oral Paradise Gay RN    7/3/2024 0322 Given 600 mg Oral CamiloJudy wheat RN    7/2/2024 2120 Given 600 mg Oral Odessa Prajapati RN          labetalol (Normodyne) tab 100 mg       Date Action Dose Route User    7/3/2024 0826 Given 100 mg Oral Paradise Gay RN    7/2/2024 2014 Given 100 mg Oral Odessa Prajapati RN          oxyTOCIN in sodium chloride 0.9% (Pitocin) 30 Units/500mL infusion premix       Date Action Dose Route User    7/2/2024 2034 Rate/Dose Change 62.5 reese-units/min Intravenous Odessa Prajapati RN    7/2/2024 2013 Rate/Dose Change 300 reese-units/min Intravenous Odessa Prajapati RN    7/2/2024 1952 Rate/Dose Change 900 reese-units/min Intravenous Odessa Prajapati RN          oxyTOCIN in sodium chloride 0.9% (Pitocin) 30 Units/500mL infusion premix       Date Action Dose Route User    7/2/2024 1800 Rate/Dose Change 14 reese-units/min Intravenous Liya Degroot RN    7/2/2024 1530 Rate/Dose Change 12 reese-units/min Intravenous Liya Degroot RN    7/2/2024 1510 Restarted 10 reese-units/min Intravenous Liya Degroot RN          prenatal vitamin with DHA (PNV with DHA) cap 1 capsule       Date Action Dose Route User    7/3/2024 0826 Given 1 capsule Oral Paradise Gay RN    7/2/2024 2120 Given 1 capsule Oral Odessa Prajapati RN          simethicone (Mylicon) chewable tab 80 mg       Date Action Dose Route User    7/3/2024 0826 Given 80 mg Oral Paradise Gay RN            Vitals (last day)       Date/Time Temp Pulse Resp BP SpO2 Weight O2 Device O2 Flow Rate (L/min) Who    07/03/24 1115 -- 104 -- 140/95 -- -- -- -- AF    07/03/24 0750 98 °F (36.7 °C) 97 16 143/97 -- -- -- -- VM    07/03/24 0325 -- 89 -- 148/97 -- -- -- -- GT    07/02/24 2225 98 °F (36.7 °C) 105 16 138/95 -- -- -- -- GT    07/02/24 2204 98.1 °F (36.7 °C) 89 -- 143/84 -- -- -- --     07/02/24 2156 -- 108 -- 119/74 -- -- -- --     07/02/24 2141 -- 114  -- 138/86 -- -- -- --     07/02/24 2130 -- 113 -- 145/93 -- -- -- --     07/02/24 2115 -- 126 -- 141/92 -- -- -- --     07/02/24 0900 -- 99 -- 160/89 -- -- -- --     07/02/24 0830 97.9 °F (36.6 °C) -- -- -- -- -- -- --     07/02/24 0602 -- 92 -- 132/37 -- -- -- --     07/02/24 0547 -- 100 -- 137/76 -- -- -- --     07/02/24 0531 97.9 °F (36.6 °C) 98 -- 162/79 -- -- -- --     07/02/24 0114 98.4 °F (36.9 °C) 102 -- 140/75 -- -- -- --

## 2024-07-03 NOTE — PROGRESS NOTES
Pt did not answer room phone  Calling to offer 6 week P/P apt    Samreen Mccabe  P/P  [phone]    No answer, LVMTCB to schedule apt

## 2024-07-03 NOTE — PROGRESS NOTES
Labor Analgesia Follow Up Note    Patient underwent epidural anesthesia for labor analgesia,    Placenta Date/Time: 7/2/2024  7:56 PM     Delivery Date/Time:: 7/2/2024   7:50 PM     BP (!) 140/95 (BP Location: Left arm)   Pulse 104   Temp 98 °F (36.7 °C)   Resp 16   Wt 112 kg (247 lb)   LMP 09/22/2023 (Exact Date)   SpO2 98%   Breastfeeding Yes   BMI 42.40 kg/m²     Assessment:  Patient seen and no apparent anesthesia related complications.    Thank you for asking us to participate in the care of your patient.  Tao Ramos MD [facility]  [place]   Pager [phone]  long range page

## 2024-07-03 NOTE — PROGRESS NOTES
Report received from CORY harvey. Patient resting comfortably in bed. POC discussed with patient. All questions answered. Patient verbalizes understanding. Call light within reach.

## 2024-07-03 NOTE — PROGRESS NOTES
Pt transferred to Mother Baby room 2214 in stable condition via wheelchair. Report given to Judy RAY. Infant transferred with mother in stable condition. Accompanied by ,

## 2024-07-04 LAB
ALBUMIN SERPL-MCNC: 2.2 G/DL (ref 3.4–5)
ALBUMIN/GLOB SERPL: 0.6 {RATIO} (ref 1–2)
ALP LIVER SERPL-CCNC: 125 U/L
ALT SERPL-CCNC: 31 U/L
ANION GAP SERPL CALC-SCNC: 9 MMOL/L (ref 0–18)
AST SERPL-CCNC: 38 U/L (ref 15–37)
BILIRUB SERPL-MCNC: 0.2 MG/DL (ref 0.1–2)
BUN BLD-MCNC: 6 MG/DL (ref 9–23)
CALCIUM BLD-MCNC: 7.8 MG/DL (ref 8.5–10.1)
CHLORIDE SERPL-SCNC: 106 MMOL/L (ref 98–112)
CO2 SERPL-SCNC: 23 MMOL/L (ref 21–32)
CREAT BLD-MCNC: 0.6 MG/DL
EGFRCR SERPLBLD CKD-EPI 2021: 122 ML/MIN/1.73M2 (ref 60–?)
ERYTHROCYTE [DISTWIDTH] IN BLOOD BY AUTOMATED COUNT: 14.6 %
GLOBULIN PLAS-MCNC: 4 G/DL (ref 2.8–4.4)
GLUCOSE BLD-MCNC: 113 MG/DL (ref 70–99)
HCT VFR BLD AUTO: 32.9 %
HGB BLD-MCNC: 10.6 G/DL
MAGNESIUM SERPL-MCNC: 4.6 MG/DL (ref 1.6–2.6)
MCH RBC QN AUTO: 28.4 PG (ref 26–34)
MCHC RBC AUTO-ENTMCNC: 32.2 G/DL (ref 31–37)
MCV RBC AUTO: 88.2 FL
OSMOLALITY SERPL CALC.SUM OF ELEC: 284 MOSM/KG (ref 275–295)
PLATELET # BLD AUTO: 300 10(3)UL (ref 150–450)
POTASSIUM SERPL-SCNC: 3.7 MMOL/L (ref 3.5–5.1)
PROT SERPL-MCNC: 6.2 G/DL (ref 6.4–8.2)
RBC # BLD AUTO: 3.73 X10(6)UL
SODIUM SERPL-SCNC: 138 MMOL/L (ref 136–145)
WBC # BLD AUTO: 11.9 X10(3) UL (ref 4–11)

## 2024-07-04 RX ORDER — CLONAZEPAM 0.25 MG/1
0.25 TABLET, ORALLY DISINTEGRATING ORAL ONCE
Status: COMPLETED | OUTPATIENT
Start: 2024-07-04 | End: 2024-07-04

## 2024-07-04 RX ORDER — RIZATRIPTAN BENZOATE 10 MG/1
10 TABLET, ORALLY DISINTEGRATING ORAL ONCE
Status: COMPLETED | OUTPATIENT
Start: 2024-07-04 | End: 2024-07-04

## 2024-07-04 RX ORDER — NIFEDIPINE 30 MG/1
30 TABLET, EXTENDED RELEASE ORAL 2 TIMES DAILY
Status: DISCONTINUED | OUTPATIENT
Start: 2024-07-04 | End: 2024-07-05

## 2024-07-04 RX ORDER — NIFEDIPINE 30 MG/1
30 TABLET, EXTENDED RELEASE ORAL DAILY
Status: DISCONTINUED | OUTPATIENT
Start: 2024-07-04 | End: 2024-07-04

## 2024-07-04 NOTE — PROGRESS NOTES
PPD 2  Patient has no complaints, h/o chronic HTN, last night was started on magnesium sulfate for superimposed preeclampsia, labetalol was increased to 100 TID    Blood pressure 136/90, pulse 90, temperature 97.9 °F (36.6 °C), temperature source Oral, resp. rate 16, weight 247 lb (112 kg), last menstrual period 2023, SpO2 98%, currently breastfeeding.    Recent Labs   Lab 24  0824  0657   RBC 3.99 3.74* 3.73*   HGB 11.6* 10.8* 10.6*   HCT 34.4* 32.8* 32.9*   MCV 86.2 87.7 88.2   MCH 29.1 28.9 28.4   MCHC 33.7 32.9 32.2   RDW 14.2 14.2 14.6   NEPRELIM 10.00* 11.61*  --    WBC 13.1* 14.4* 11.9*   .0 262.0 300.0       Recent Labs   Lab 24  0656   GLU 96  96 113*   BUN 8*  8* 6*   CREATSERUM 0.70  0.70 0.60   EGFRCR 118  118 122   CA 9.5  9.5 7.8*   ALB 2.6* 2.2*   *  134* 138   K 3.8  3.8 3.7     104 106   CO2 21.0  21.0 23.0   ALKPHO 139* 125*   AST 30 38*   ALT 31 31   BILT 0.3 0.2   TP 6.8 6.2*     Abdomen: soft, NT/ND  Uterus: firm, below umbilicus    A/P; s/p , cHTN with superimposed preeclampsia  - complete 24 hours of magnesium Sulfate  - add procardia 30 XL daily

## 2024-07-04 NOTE — PROGRESS NOTES
Patient transferred to 1105 and RN Patricia will care for her, patient introduced to nurse.  and baby came with patient.Report given to l/d RN.

## 2024-07-04 NOTE — PROGRESS NOTES
RN monitoring bp on patient, trending high. Dr Jane felix served and made aware of blood pressures.RN asked if any orders at this time. Will monitor and reach out again.

## 2024-07-04 NOTE — PROGRESS NOTES
Blood pressure repeated and phone call to Dr Lara placed. Given result of latest reading, will send patient to l/d to begin mag. Dr agreed next dose of Labetalol to be given now before patient is moved over. Dose of Labetalol to be changed to every eight hours. RN went in room and gave medication. Spoke to patient about the change in blood pressure and the reason why she needs to go to l/d to be started on   Mag protocol, patient understands.

## 2024-07-05 VITALS
RESPIRATION RATE: 16 BRPM | BODY MASS INDEX: 42 KG/M2 | WEIGHT: 247 LBS | DIASTOLIC BLOOD PRESSURE: 78 MMHG | TEMPERATURE: 98 F | HEART RATE: 88 BPM | SYSTOLIC BLOOD PRESSURE: 117 MMHG | OXYGEN SATURATION: 98 %

## 2024-07-05 LAB — GLUCOSE BLD-MCNC: 77 MG/DL (ref 70–99)

## 2024-07-05 RX ORDER — NIFEDIPINE 30 MG/1
60 TABLET, EXTENDED RELEASE ORAL 2 TIMES DAILY
Status: DISCONTINUED | OUTPATIENT
Start: 2024-07-05 | End: 2024-07-06

## 2024-07-05 RX ORDER — NIFEDIPINE 30 MG
60 TABLET, EXTENDED RELEASE ORAL 2 TIMES DAILY
Qty: 120 TABLET | Refills: 0 | Status: SHIPPED | OUTPATIENT
Start: 2024-07-06 | End: 2024-08-05

## 2024-07-05 RX ORDER — NIFEDIPINE 30 MG/1
30 TABLET, EXTENDED RELEASE ORAL ONCE
Status: COMPLETED | OUTPATIENT
Start: 2024-07-05 | End: 2024-07-05

## 2024-07-05 NOTE — PROGRESS NOTES
Post Partum Progress Note    PPD3    Subjective:   Patient reports that her pain  is controlled with medications. Lochia normal. Tolerating PO. +flatus. +voiding. + ambulating. Is breastfeeding. No other complaints.   Denies HA, VC, CP, SOB, RUQ pain  Moodwise, is very sad because she wants to go home.  Would like us to be more aggressive with our blood pressure medication.      Objective:  Vitals:    24 2041 24 0000 24 0400 24 0748   BP: (!) 135/92 (!) 143/93 125/77 (!) 144/97   BP Location:    Left arm   Pulse: 94 81 78 86   Resp:    18   Temp:    98.3 °F (36.8 °C)   TempSrc:    Oral   SpO2:       Weight:         Temp:  [98.3 °F (36.8 °C)] 98.3 °F (36.8 °C)  Pulse:  [] 86  Resp:  [18] 18  BP: (125-152)/() 144/97       Physical Exam  Gen A&O, NAD  CV regular rate  Lungs no increased WOB  Abd soft, non-distended, fundus firm under umbilicus  Ext nontender  Recent Labs   Lab 24  0801 24  0657   RBC 3.99 3.74* 3.73*   HGB 11.6* 10.8* 10.6*   HCT 34.4* 32.8* 32.9*   MCV 86.2 87.7 88.2   MCH 29.1 28.9 28.4   MCHC 33.7 32.9 32.2   RDW 14.2 14.2 14.6   NEPRELIM 10.00* 11.61*  --    WBC 13.1* 14.4* 11.9*   .0 262.0 300.0       Recent Labs   Lab 24  0656   GLU 96  96 113*   BUN 8*  8* 6*   CREATSERUM 0.70  0.70 0.60   EGFRCR 118  118 122   CA 9.5  9.5 7.8*   ALB 2.6* 2.2*   *  134* 138   K 3.8  3.8 3.7     104 106   CO2 21.0  21.0 23.0   ALKPHO 139* 125*   AST 30 38*   ALT 31 31   BILT 0.3 0.2   TP 6.8 6.2*     Assessment and Plan: 33 year old  now PPD#3 s/p     Quantitative Blood Loss (mL) 204       #Post partum care: meeting goals of care  #SIPS  - Blood pressures: elevated  - labs WNL  - Meds: Procardia 60 BID (changed on 24), labetalol 100 TID   #Anemia: HgB 10.6  - PO iron at home  #Vaccinations: up to date  #Rh status: positive      SCDs, ambulation encouraged  Disposition: anticipate  discharge late tonight or tomorrow      Krupaaisha Welch MD

## 2024-07-05 NOTE — PLAN OF CARE
Problem: POSTPARTUM  Goal: Long Term Goal:Experiences normal postpartum course  Description: INTERVENTIONS:  - Assess and monitor vital signs and lab values.  - Assess fundus and lochia.  - Provide ice/sitz baths for perineum discomfort.  - Monitor healing of incision/episiotomy/laceration, and assess for signs and symptoms of infection and hematoma.  - Assess bladder function and monitor for bladder distention.  - Provide/instruct/assist with pericare as needed.  - Provide VTE prophylaxis as needed.  - Monitor bowel function.  - Encourage ambulation and provide assistance as needed.  - Assess and monitor emotional status and provide social service/psych resources as needed.  - Utilize standard precautions and use personal protective equipment as indicated. Ensure aseptic care of all intravenous lines and invasive tubes/drains.  - Obtain immunization and exposure to communicable diseases history.  Outcome: Progressing  Goal: Optimize infant feeding at the breast  Description: INTERVENTIONS:  - Initiate breast feeding within first hour after birth.   - Monitor effectiveness of current breast feeding efforts.  - Assess support systems available to mother/family.  - Identify cultural beliefs/practices regarding lactation, letdown techniques, maternal food preferences.  - Assess mother's knowledge and previous experience with breast feeding.  - Provide information as needed about early infant feeding cues (e.g., rooting, lip smacking, sucking fingers/hand) versus late cue of crying.  - Discuss/demonstrate breast feeding aids (e.g., infant sling, nursing footstool/pillows, and breast pumps).  - Encourage mother/other family members to express feelings/concerns, and actively listen.  - Educate father/SO about benefits of breast feeding and how to manage common lactation challenges.  - Recommend avoidance of specific medications or substances incompatible with breast feeding.  - Assess and monitor for signs of nipple  pain/trauma.  - Instruct and provide assistance with proper latch.  - Review techniques for milk expression (breast pumping) and storage of breast milk. Provide pumping equipment/supplies, instructions and assistance, as needed.  - Encourage rooming-in and breast feeding on demand.  - Encourage skin-to-skin contact.  - Provide LC support as needed.  - Assess for and manage engorgement.  - Provide breast feeding education handouts and information on community breast feeding support.   Outcome: Progressing  Goal: Establishment of adequate milk supply with medication/procedure interruptions  Description: INTERVENTIONS:  - Review techniques for milk expression (breast pumping).   - Provide pumping equipment/supplies, instructions, and assistance until it is safe to breastfeed infant.  Outcome: Progressing  Goal: Appropriate maternal -  bonding  Description: INTERVENTIONS:  - Assess caregiver- interactions.  - Assess caregiver's emotional status and coping mechanisms.  - Encourage caregiver to participate in  daily care.  - Assess support systems available to mother/family.  - Provide /case management support as needed.  Outcome: Progressing     Problem: COPING  Goal: Pt/Family able to verbalize concerns and demonstrate effective coping strategies  Description: INTERVENTIONS:  - Assist patient/family to identify coping skills, available support systems and cultural and spiritual values  - Provide emotional support, including active listening and acknowledgement of concerns of patient and caregivers  - Reduce environmental stimuli, as able  - Instruct patient/family in relaxation techniques, as appropriate  - Assess for spiritual and psychosocial needs and initiate Spiritual Care or Behavioral Health consult as needed  Outcome: Progressing

## 2024-07-05 NOTE — PAYOR COMM NOTE
--------------  CONTINUED STAY REVIEW    Payor: [facility]  Subscriber #:  [id number]  Authorization Number: [id number]    Admit date: 7/1/24  Admit time:  8:02 PM    REVIEW DOCUMENTATION:   7-5-24              MEDICATIONS ADMINISTERED IN LAST 1 DAY:  acetaminophen (Tylenol Extra Strength) tab 1,000 mg       Date Action Dose Route User    7/4/2024 1237 Given 1,000 mg Oral Fior Beltran RN          clonazePAM (KLONOPIN) disintegrating tab 0.25 mg       Date Action Dose Route User    7/4/2024 1457 Given 0.25 mg Oral Fior Beltran RN          docusate sodium (Colace) cap 100 mg       Date Action Dose Route User    7/5/2024 0548 Given 100 mg Oral Mary Monique RN          ibuprofen (Motrin) tab 600 mg       Date Action Dose Route User    7/5/2024 0854 Given 600 mg Oral Zenobia Barnett RN    7/5/2024 0250 Given 600 mg Oral Mary Monique RN    7/4/2024 2041 Given 600 mg Oral Lee Ann Kelly RN          labetalol (Normodyne) tab 100 mg       Date Action Dose Route User    7/5/2024 0250 Given 100 mg Oral Mary Monique RN    7/4/2024 1852 Given 100 mg Oral Fior Beltran RN          magnesium sulfate 40 mg/mL infusion premix       Date Action Dose Route User    7/4/2024 1900 Hi-Risk Rate/Dose Verify 2 g/hr Intravenous Fior Beltran RN    7/4/2024 1800 Hi-Risk Rate/Dose Verify 2 g/hr Intravenous Fior Beltran RN    7/4/2024 1700 Hi-Risk Rate/Dose Verify 2 g/hr Intravenous Fior Beltran RN    7/4/2024 1600 Hi-Risk Rate/Dose Verify 2 g/hr Intravenous Fior Beltran RN    7/4/2024 1500 Hi-Risk Rate/Dose Verify 2 g/hr Intravenous Fior Beltran RN    7/4/2024 1400 Hi-Risk Rate/Dose Verify 2 g/hr Intravenous Fior Beltran RN    7/4/2024 1348 New Bag 2 g/hr Intravenous Fior Beltran RN    7/4/2024 1300 Hi-Risk Rate/Dose Verify 2 g/hr Intravenous Fior Beltran RN    7/4/2024 1200 Hi-Risk Rate/Dose Verify 2 g/hr Intravenous Fior Beltran RN          NIFEdipine ER (Procardia-XL) 24 hr  tab 30 mg       Date Action Dose Route User    7/5/2024 0854 Given 30 mg Oral Zenobia Barnett RN    7/4/2024 2041 Given 30 mg Oral Lee Ann Kelly RN          prenatal vitamin with DHA (PNV with DHA) cap 1 capsule       Date Action Dose Route User    7/5/2024 0854 Given 1 capsule Oral Zenobia Barnett RN          rizatriptan (Maxalt-MLT) disintegrating tab 10 mg       Date Action Dose Route User    7/4/2024 1451 Given 10 mg Oral Fior Beltran RN            Vitals (last day)       Date/Time Temp Pulse Resp BP SpO2 Weight O2 Device O2 Flow Rate (L/min) [facility]    07/05/24 0748 98.3 °F (36.8 °C) 86 18 144/97 -- -- -- -- GG    07/05/24 0400 -- 78 -- 125/77 -- -- -- -- LT    07/05/24 0000 -- 81 -- 143/93 -- -- -- -- LT    07/04/24 2041 -- 94 -- 135/92 -- -- -- -- LL    07/04/24 2000 -- 99 -- 136/96 -- -- -- -- LL    07/04/24 1900 -- 94 -- 151/103 -- -- -- -- BB    07/04/24 1800 -- 96 -- 147/104 -- -- -- -- BB    07/04/24 1705 -- 86 -- 150/98 -- -- -- -- BB    07/04/24 1600 -- 102 -- 141/84 -- -- -- -- BB    07/04/24 0500 98.7 °F (37.1 °C) 99 14 117/84 -- -- -- -- CP    07/04/24 0400 -- 90 16 122/89 -- -- -- -- CP    07/04/24 0319 -- 91 -- 147/93 -- -- -- -- CP    07/04/24 0300 -- 100 18 139/99 -- -- -- -- CP    07/04/24 0200 -- 91 16 136/97 -- -- -- -- CP    07/04/24 0059 98.2 °F (36.8 °C) 87 16 139/98 -- -- -- -- CM

## 2024-07-06 NOTE — DISCHARGE INSTRUCTIONS
Postpartum care: What to expect after a vaginal birth  When caring for a , you might forget to care for yourself. But that's important too. Learn what's involved as you recover from giving birth.  Pregnancy changes a body in more ways than you might expect. And that doesn't stop when you give birth. Here's what can happen physically and emotionally after a vaginal delivery.  Vaginal soreness  You might have had a tear in your vagina during delivery. Or your healthcare professional may have made a cut in the vaginal opening, called an episiotomy, to make delivery easier. The wound may hurt for a few weeks. Large tears can take longer to heal. To ease the pain:  Sit on a pillow or padded ring.  Cool the area with an ice pack. Or put a chilled witch hazel pad between a sanitary napkin and the area between your vaginal opening and anus. That area is called the perineum.  Use a squirt bottle to spray warm water over the perineum as you urinate.  Sit in a warm bath just deep enough to cover your buttocks and hips for five minutes. Use cold water if it feels better.  Take a pain reliever that you can buy without a prescription. Ask your healthcare professional about a numbing spray or cream, if needed.  .Avoid straining due to constipation through adequate hydration and a diet that incorporates whole foods that are plant-based.  If this is not enough to keep your stools a toothpaste like consistency, please add over-the-counter fiber supplementation like Metamucil or a daily osmotic laxative like Miralax.  You can take one capful of Miralax with water or juice each morning and, as needed, in the evening.  While having a bowel movement, you can use can also use a stool under your feet or a squatty potty to help prevent straining.  Tell your healthcare professional if you have intense pain, lasting pain or if the pain gets worse. It could be a sign of an infection.    Vaginal discharge  After delivery, a mix of blood,  mucus and tissue from the uterus comes out of the vagina. This is called discharge. The discharge changes color and lessens over 4 to 6 weeks after a baby is born. It starts bright red, then turns darker red. After that, it usually turns yellow or white. The discharge then slows and becomes watery until it stops.  Contact your healthcare professional if blood from your vagina soaks a pad hourly for two hours in a row, especially if you also have a fever, pelvic pain or tenderness.  Contractions  You might feel contractions, sometimes called afterpains, for a few days after delivery. These contractions often feel like menstrual cramps. They help keep you from bleeding too much because they put pressure on the blood vessels in the uterus. Afterpains are common during breastfeeding. That's because breastfeeding causes the release of the hormone oxytocin.  To ease the pain, you can use acetaminophen (Tylenol, others) or ibuprofen (Advil, Motrin IB, others).  Leaking urine  Pregnancy, labor and a vaginal delivery can stretch or hurt your pelvic floor muscles. These muscles support the uterus, bladder and rectum. As a result, some urine might leak when you sneeze, laugh or cough. The leaking usually gets better within a week. But it might go on longer. Leaking urine also is called incontinence.  Until the leaking stops, wear sanitary pads. Do pelvic floor muscle training, also called Kegels, to tone your pelvic floor muscles and help control your bladder.  To do Kegels, think of sitting on a marble. Tighten your pelvic muscles as if you're lifting the marble. Try it for three seconds at a time, then relax for a count of three. Work up to doing the exercise 10 to 15 times in a row, at least three times a day. To make sure you're doing Kegels right, it might help to see a physical therapist who specializes in pelvic floor exercises.  Hemorrhoids and bowel movements  If you notice pain during bowel movements and feel  swelling near your anus, you might have swollen veins in the anus or lower rectum, called hemorrhoids. To ease hemorrhoid pain:  Use a hemorrhoid cream or a medicine that you put into your anus, called a suppository, that has hydrocortisone. You can buy either without a prescription.  Wipe the area with pads that have witch hazel or a numbing agent.  Soak your anal area in plain warm water for 10 to 15 minutes 2 to 3 times a day.  You might be afraid to have a bowel movement because you don't want to make the pain of hemorrhoids or your episiotomy wound worse. Take steps to keep stools soft and regular. Eat foods high in fiber, including fruits, vegetables and whole grains. Drink plenty of water. Ask your healthcare professional about a stool softener, if needed.  Sore breasts  A few days after giving birth, you might have full, firm, sore breasts. That's because your breast tissue overfills with milk, blood and other fluids. This condition is called engorgement. Breastfeed your baby often on both breasts to help keep them from overfilling.  If your breasts are engorged, your baby might have trouble attaching for breastfeeding. To help your baby latch on, you can use your hand or a breast pump to let out some breast milk before feeding your baby. That process is called expressing.  To ease sore breasts, put warm washcloths on them or take a warm shower before breastfeeding or expressing. That can make it easier for the milk to flow. Between feedings, put cold washcloths on your breasts. Pain relievers you can buy without a prescription might help too.  If you're not breastfeeding, wear a bra that supports your breasts, such as a sports bra. Don't pump your breasts or express the milk. That causes your breasts to make more milk. Putting ice packs on your breasts can ease discomfort. Pain relievers available without a prescription also can be helpful.  Hair loss and skin changes  During pregnancy, higher hormone  levels mean your hair grows faster than it sheds. The result is more hair on your head. But for up to five months after giving birth, you lose more hair than you grow. This hair loss stops over time.  Stretch marks on the skin don't go away after delivery. But in time, they fade. Expect any skin that got darker during pregnancy, such as dark patches on your face, to fade slowly too.  Mood changes  Childbirth can trigger a lot of feelings. Many people have a period of feeling down or anxious after giving birth, sometimes called the baby blues. Symptoms include mood swings, crying spells, anxiety and trouble sleeping. These feelings often go away within two weeks. In the meantime, take good care of yourself. Share your feelings, and ask your partner, loved ones or friends for help.  If you have large mood swings, don't feel like eating, are very tired and lack camila in life shortly after childbirth, you might have postpartum depression. Contact your healthcare professional if you think you might be depressed. Be sure to seek help if:  Your symptoms don't go away on their own.  You have trouble caring for your baby.  You have a hard time doing daily tasks.  You think of harming yourself or your baby.    Medicines and counseling often can ease postpartum depression.  Please talk to your provider if you are interested in either of these treatments.  Weight loss  It's common to still look pregnant after giving birth. Most people lose about 13 pounds (6 kilograms) during delivery. This loss includes the weight of the baby, placenta and amniotic fluid.  In the days after delivery, you'll lose more weight from leftover fluids. After that, a healthy diet and regular exercise can help you to return to the weight you were before pregnancy.  Postpartum checkups  The American College of Obstetricians and Gynecologists says that postpartum care should be an ongoing process rather than a single visit after delivery. Check in with  your healthcare professional within 2 to 3 weeks after delivery by phone or in person to talk about any issues you've had since giving birth.  Within 6 to 12 weeks after delivery, see your healthcare professional for a complete postpartum exam. During this visit, your healthcare professional does a physical exam and checks your belly, vagina, cervix and uterus to see how well you're healing.  Things to talk about at this visit include:  Your mood and emotional well-being.  How well you're sleeping.  Other symptoms you might have, such as tiredness.  Birth control and birth spacing.  Baby care and feeding.  When you can start having sex again.  What you can do about pain with sex or not wanting to have sex.  How you're adjusting to life with a new baby.  This checkup is a chance for you and your healthcare professional to make sure you're OK. It's also a time to get answers to questions you have about life after giving birth.

## 2024-07-06 NOTE — DISCHARGE SUMMARY
[facility]   part of [facility]    Discharge Summary    Tammy Ramirez Patient Status:  Inpatient    1990 MRN [id number]   Location [facility] 1SW-J Attending Meaghan Rock, DO   Hosp Day # 4 PCP Landon Long MD       Date of Admission: 2024    Date of Discharge: 2024    Patient is a 33 year old s/p  at 37w4d after .  Delivery and post partum course complicated by SIPS.  Discharged on 60 BID.  On post partum day 3, patient had met goals of post partum care and was discharged home with planned follow up with her OB provider in within 6 weeks and 1 week BP follow up.

## 2024-07-08 ENCOUNTER — PATIENT MESSAGE (OUTPATIENT)
Dept: OBGYN CLINIC | Facility: CLINIC | Age: 34
End: 2024-07-08

## 2024-07-08 RX ORDER — LABETALOL 100 MG/1
100 TABLET, FILM COATED ORAL 2 TIMES DAILY
Qty: 60 TABLET | Refills: 1 | Status: SHIPPED | OUTPATIENT
Start: 2024-07-08 | End: 2024-09-06

## 2024-07-08 NOTE — TELEPHONE ENCOUNTER
From: Tammy Ramirez  To: Tammy Novak  Sent: 7/8/2024 9:31 AM CDT  Subject: Labetalol    Good morning,  I had my daughter this past week and my blood pressure was extremelyhigh after. When being release from the hospital late Friday the dr had told me to discontinue my Labetalol and just take Nifedipine twice daily. However my blood pressure started to get very high again and so I called the on call Dr and was advised to get back on the labetalol twice a day as well (this is what they were doing in the hospital prior to releasing me). I am in need of a refill on the labetalol 100mg but could not request a refill since its been taken off the list of medications.    Thank you,  Tammy Ramirez

## 2024-07-08 NOTE — PAYOR COMM NOTE
--------------  DISCHARGE REVIEW    Payor: [facility]  Subscriber #:  [id number]  Authorization Number: [id number]    Admit date: 24  Admit time:   8:02 PM  Discharge Date: 2024 10:46 PM     Admitting Physician: Meaghan Rock DO  Attending Physician:  No att. providers found  Primary Care Physician: Landon Long MD          Discharge Summary Notes        Discharge Summary signed by Krupa Welch MD at 2024  9:29 PM       Author: Krupa Welch MD Specialty: OBSTETRICS & GYNECOLOGY Author Type: Physician    Filed: 2024  9:29 PM Date of Service: 2024  9:28 PM Status: Addendum    : Krupa Welch MD (Physician)    Related Notes: Original Note by Krupa Welch MD (Physician) filed at 2024  9:29 PM           [facility]   part of [facility]    Discharge Summary    Tammy Ramirez Patient Status:  Inpatient    1990 MRN [id number]   Location [facility] 1SW-J Attending Meaghan Rock DO   Hosp Day # 4 PCP Landon Long MD       Date of Admission: 2024    Date of Discharge: 2024    Patient is a 33 year old s/p  at 37w4d after .  Delivery and post partum course complicated by SIPS.  Discharged on 60 BID.  On post partum day 3, patient had met goals of post partum care and was discharged home with planned follow up with her OB provider in within 6 weeks and 1 week BP follow up.    Electronically signed by Krupa Welch MD on 2024  9:29 PM         REVIEWER COMMENTS

## 2024-07-09 ENCOUNTER — TELEPHONE (OUTPATIENT)
Dept: OBGYN UNIT | Facility: HOSPITAL | Age: 34
End: 2024-07-09

## 2024-07-10 ENCOUNTER — POSTPARTUM (OUTPATIENT)
Facility: CLINIC | Age: 34
End: 2024-07-10
Payer: MEDICAID

## 2024-07-10 VITALS
WEIGHT: 220.63 LBS | HEART RATE: 99 BPM | SYSTOLIC BLOOD PRESSURE: 132 MMHG | DIASTOLIC BLOOD PRESSURE: 80 MMHG | BODY MASS INDEX: 37.67 KG/M2 | HEIGHT: 64 IN

## 2024-07-10 NOTE — PROGRESS NOTES
Tammy Ramirez is a 33 year old female  Patient's last menstrual period was 2023 (exact date).   Chief Complaint   Patient presents with    Postpartum Care     BP check  Patient delivered on 24, female,, currently breastfeeding    .  She is currently taking 100 mg labetalol BID and 60 mg Procardia BID.     OBSTETRICS HISTORY:  OB History    Para Term  AB Living   1 1 1     1   SAB IAB Ectopic Multiple Live Births         0 1      # Outcome Date GA Lbr Lamberto/2nd Weight Sex Type Anes PTL Lv   1 Term 24 37w4d 09:39 / 00:41 7 lb 15 oz (3.6 kg) F NORMAL SPONT EPI N CURTIS      Complications: Variable decelerations, Chronic hypertension, Preeclampsia      Obstetric Comments   Current - Obesity (pre preg BMI 36), chronic hypertension - labetalol started at 17 wk, GDM dx at 16 wk -> on insulin by 20 wk. Intractable migraines  -> improved after starting labetalol. Occipital neuralgia. CHEYANNE, insomnia, H/o PVCs. Subclinical hyperthyroidism, vit D deficiency, pelvic floor dysfunction, Pap NILM +HPV18/45.        GYNE HISTORY:      History   Sexual Activity    Sexual activity: Not on file                 MEDICAL HISTORY:  Past Medical History:    Anemia    Anxiety    Arthritis    Not sure of the date but i have arthritus in my neck    Bilateral occipital neuralgia    Calculus of kidney    About a year and a half ago    Cervical facet syndrome    Cervical high risk HPV (human papillomavirus) test positive    Pap neg, +HPV 18/45 in 1st trimester.    Chronic hypertension    Since at least 2022 per chart review, though patient denies this diagnosis.    Chronic migraine without aura, with intractable migraine, so stated, with status migrainosus    chronic since age 11    DDD (degenerative disc disease), cervical    MRI C spine  - Mild multilevel disc desiccation and bulging without significant loss of intervertebral disc space height. No significant spinal canal or neuroforaminal stenosis.     Dysmenorrhea    Essential hypertension    CHTN    CHEYANNE (generalized anxiety disorder)    per CareEverywhere    Gestational diabetes (HCC)    Gestational diabetes requiring insulin (HCC)    Dx 16 wk with GDM. On insulin by 20 wk    History of echocardiogram    3/31/2021 TTE - LVEF 55-60%. Unremarkable. Done for h/o palpitations, PVCs    History of echocardiogram    3/13/24 TTE at 21+ wk LVEF 60-65%. No wall motion abnormality.  No significant valvular stenosis or regurgitation.    Insomnia    Meningitis (HCC)    mid 20s. They thought maybe mumps?    Menometrorrhagia    Migraine    Obesity (BMI 30-39.9)    Palpitations    Holter monitor 2021 minor PVCs. 3/2021 Echo LVEF 55-60%, unremarkable.    Pregnancy-induced hypertension (HCC)    Screening for genetic disease carrier status    [employer] carrier screen NEGATIVE    Spinal headache    H/o spinal puncture headaches after epidurals       SURGICAL HISTORY:  Past Surgical History:   Procedure Laterality Date    Colposcopy, cervix w/upper adjacent vagina; w/biopsy(s), cervix  02/07/2024    16w5d - cervical biopsy polypoid endocervical glandular tissue - Dr. Rock     inject anesthetic agent greater occipital nerve  01/10/2024    Bilateral greater occipital nerve block - Depo-medrol & 1% lidocaine - Dr. Barnes. Done at approx 12 wk gestation    Lumbar puncture/spinal tap(bedside)      mid 20s. Dx meningitis    Other surgical history  07/2021 7/2021: cervical medial branch block C2-C5, seen in ED after procedure for dizziness, left sided facial numbness, worsening headache.    Tonsillectomy         SOCIAL HISTORY:  Social History     Socioeconomic History    Marital status: Single     Spouse name: Not on file    Number of children: Not on file    Years of education: Not on file    Highest education level: Not on file   Occupational History    Not on file   Tobacco Use    Smoking status: Never    Smokeless tobacco: Never   Vaping Use    Vaping status: Never Used    Substance and Sexual Activity    Alcohol use: Never    Drug use: Not Currently     Types: Cannabis     Comment: Recreaction in the past. NOT current.    Sexual activity: Not on file   Other Topics Concern     Service Not Asked    Blood Transfusions Not Asked    Caffeine Concern Yes     Comment: soda occ    Occupational Exposure Not Asked    Hobby Hazards Not Asked    Sleep Concern Not Asked    Stress Concern Not Asked    Weight Concern Not Asked    Special Diet Not Asked    Back Care Not Asked    Exercise No    Bike Helmet Not Asked    Seat Belt Not Asked    Self-Exams Not Asked   Social History Narrative    Not on file     Social Determinants of Health     Financial Resource Strain: Low Risk  (7/1/2024)    Financial Resource Strain     Difficulty of Paying Living Expenses: Not hard at all     Med Affordability: No   Food Insecurity: No Food Insecurity (7/1/2024)    Food Insecurity     Food Insecurity: Never true   Transportation Needs: No Transportation Needs (7/1/2024)    Transportation Needs     Lack of Transportation: No     Car Seat: Yes   Stress: No Stress Concern Present (7/1/2024)    Stress     Feeling of Stress : No   Housing Stability: Low Risk  (7/1/2024)    Housing Stability     Housing Instability: No     Housing Instability Emergency: Not on file     Crib or Bassinette: Yes       FAMILY HISTORY:  Family History   Problem Relation Age of Onset    Other (Brugada syndrome) Father         defibrillator    Heart Disorder Father         Atrial flutter    Migraines Father         very difficult to treat    Other (Vasculitis) Father     Genetic Disease Father         Brugada Disease    Dementia Paternal Grandfather     Asthma Paternal Grandmother        MEDICATIONS:    Current Outpatient Medications:     labetalol 100 MG Oral Tab, Take 1 tablet (100 mg total) by mouth 2 (two) times daily., Disp: 60 tablet, Rfl: 1    NIFEdipine ER 30 MG Oral Tablet 24 Hr, Take 2 tablets (60 mg total) by mouth in the  morning and 2 tablets (60 mg total) before bedtime., Disp: 120 tablet, Rfl: 0    FAMOTIDINE 20 MG Oral Tab, TAKE 1 TABLET BY MOUTH TWICE A DAY (Patient not taking: Reported on 7/10/2024), Disp: 180 tablet, Rfl: 0    Ferrous Sulfate 325 (65 Fe) MG Oral Tab, Take every other day PO at least 4 hours separate from other iron supplement (e.g. prenatal vitamin) (Patient not taking: Reported on 7/10/2024), Disp: 90 tablet, Rfl: 3    pantoprazole (PROTONIX) 40 MG Oral Tab EC, Take 1 tablet (40 mg total) by mouth daily. (Patient not taking: Reported on 7/10/2024), Disp: 30 tablet, Rfl: 11    aspirin 81 MG Oral Tab EC, Take 2 tablets (162 mg total) by mouth daily. (Patient not taking: Reported on 7/10/2024), Disp: , Rfl:     Cholecalciferol (VITAMIN D) 50 MCG (2000 UT) Oral Cap, Take by mouth 2 (two) times a day. (Patient not taking: Reported on 7/10/2024), Disp: , Rfl:     hydrOXYzine 25 MG Oral Tab, Take 1-2 tablets (25-50 mg total) by mouth 3 (three) times daily as needed for Anxiety (for sleep trouble). (Patient not taking: Reported on 7/10/2024), Disp: 30 tablet, Rfl: 11    Magnesium 200 MG Oral Tab, Take 2 tablets (400 mg total) by mouth daily. (Patient not taking: Reported on 7/10/2024), Disp: , Rfl:     pyridoxine 50 MG Oral Tab, Take 1 tablet (50 mg total) by mouth daily. (Patient not taking: Reported on 6/14/2024), Disp: , Rfl:     Prenatal MV-Min-Fe Fum-FA-DHA (PRENATAL 1 OR), Take by mouth. (Patient not taking: Reported on 7/10/2024), Disp: , Rfl:     ALLERGIES:    Allergies   Allergen Reactions    Droperidol ANXIETY, RESTLESSNESS and Tightness in Chest         PHYSICAL EXAM:   BP in clinic 132/80, at home bps are  130-140's/80's-90's  Denies headache, blurred vision, or epigastric pain     Assessment & Plan:  1. Hypertension complicating pregnancy, delivered-current hospitalization (HCC)  -continue 100 mg Labetolol BID and Procardia 60 mg BID as directed  -call office if BP is 160/100 consistently or having PreE  symptoms     Keep routine postpartum appt.

## 2024-07-15 ENCOUNTER — NURSE ONLY (OUTPATIENT)
Dept: LACTATION | Facility: HOSPITAL | Age: 34
End: 2024-07-15
Attending: OBSTETRICS & GYNECOLOGY
Payer: MEDICAID

## 2024-07-15 PROCEDURE — 99213 OFFICE O/P EST LOW 20 MIN: CPT

## 2024-07-18 ENCOUNTER — PATIENT MESSAGE (OUTPATIENT)
Dept: FAMILY MEDICINE CLINIC | Facility: CLINIC | Age: 34
End: 2024-07-18

## 2024-07-18 DIAGNOSIS — G43.719 INTRACTABLE CHRONIC MIGRAINE WITHOUT AURA AND WITHOUT STATUS MIGRAINOSUS: ICD-10-CM

## 2024-07-18 RX ORDER — NARATRIPTAN 2.5 MG/1
2.5 TABLET ORAL AS NEEDED
Qty: 18 TABLET | Refills: 0 | Status: SHIPPED | OUTPATIENT
Start: 2024-07-18

## 2024-07-18 NOTE — TELEPHONE ENCOUNTER
From: Tammy Ramirez  To: Landon Long  Sent: 7/18/2024 11:32 AM CDT  Subject: Naratriptan     Good morning,  I went to refill my naratriptan medication today at my pharmacy and was told I needed a new prescription sent in for it. I don't see the medication on my list here in order to request a refill. I only take it as needed for my migraine attacks. The last time I had it filled was a couple months ago. It was 2.5mgs quantity 18.     Thanks,  Tammy Ramirez

## 2024-08-04 ENCOUNTER — PATIENT MESSAGE (OUTPATIENT)
Dept: NEUROLOGY | Facility: CLINIC | Age: 34
End: 2024-08-04

## 2024-08-04 DIAGNOSIS — G43.711 CHRONIC MIGRAINE WITHOUT AURA, WITH INTRACTABLE MIGRAINE, SO STATED, WITH STATUS MIGRAINOSUS: Primary | ICD-10-CM

## 2024-08-05 NOTE — TELEPHONE ENCOUNTER
From: Tammy Ramirez  To: Noelle Del Angel  Sent: 8/4/2024 4:48 AM CDT  Subject: Migraine Medication    Good morning,  I had my daughter a month ago and am breastfeeding. My lactation consultant had given me the OK to take naratriptan while breastfeeding in the hospital but told me if I wanted to be safe to formula feed for up to 7 hours after taking rhe medication because of the elimination half life. Is it possible to switch to a triptan that has a shorter elimination half life so that I can resume breastfeeding sooner? Perhaps sumatriptan? I had asked my gynecologist but was advised to ask a Neurologist.     Thanks,  Tammy Ramirez

## 2024-08-09 RX ORDER — SUMATRIPTAN 50 MG/1
TABLET, FILM COATED ORAL
Qty: 9 TABLET | Refills: 0 | Status: SHIPPED | OUTPATIENT
Start: 2024-08-09

## 2024-08-19 ENCOUNTER — POSTPARTUM (OUTPATIENT)
Facility: CLINIC | Age: 34
End: 2024-08-19
Payer: MEDICAID

## 2024-08-19 VITALS
HEIGHT: 64 IN | WEIGHT: 212.38 LBS | BODY MASS INDEX: 36.26 KG/M2 | HEART RATE: 74 BPM | SYSTOLIC BLOOD PRESSURE: 122 MMHG | DIASTOLIC BLOOD PRESSURE: 80 MMHG

## 2024-08-19 DIAGNOSIS — Z12.4 ENCOUNTER FOR PAPANICOLAOU SMEAR FOR CERVICAL CANCER SCREENING: ICD-10-CM

## 2024-08-19 DIAGNOSIS — Z30.011 ENCOUNTER FOR INITIAL PRESCRIPTION OF CONTRACEPTIVE PILLS: ICD-10-CM

## 2024-08-19 DIAGNOSIS — Z86.32 HISTORY OF GESTATIONAL DIABETES: ICD-10-CM

## 2024-08-19 PROCEDURE — 88175 CYTOPATH C/V AUTO FLUID REDO: CPT | Performed by: STUDENT IN AN ORGANIZED HEALTH CARE EDUCATION/TRAINING PROGRAM

## 2024-08-19 PROCEDURE — 87624 HPV HI-RISK TYP POOLED RSLT: CPT | Performed by: STUDENT IN AN ORGANIZED HEALTH CARE EDUCATION/TRAINING PROGRAM

## 2024-08-19 RX ORDER — ACETAMINOPHEN AND CODEINE PHOSPHATE 120; 12 MG/5ML; MG/5ML
0.35 SOLUTION ORAL DAILY
Qty: 84 TABLET | Refills: 4 | Status: SHIPPED | OUTPATIENT
Start: 2024-08-19 | End: 2025-08-19

## 2024-08-19 NOTE — PROGRESS NOTES
[facility]  Obstetrics and Gynecology   Postpartum Progress Note    CC:   Chief Complaint   Patient presents with    Postpartum Care     Delivered 24 baby girl, vaginal and breast feeding.        Subjective:     Tammy Ramirez is a 33 year old  female who is here for postpartum visit.     Her pregnancy was complicated by chronic HTN with superimposed preeclampsia with severe features, GDM on insulin.  Also had pap 2023 NILM but HPV positive, +type 18/45, had colposcopy during pregnancy with no dysplasia but path \"endocervical glandular tissue, no squamous epithelium\" so planned to repeat pap/HPV postpartum     Pt had been discharged on labetalol 100 BID and nifedipine 60 BID. A few weeks ago stopped both medications because blood pressures had normalized. However recently has had occasional Bps up to 160s systolic, if that happens she will take one dose of labetalol 100mg. No low Bps.   Pt was not on any BP meds before pregnancy, did not have a formal diagnosis at that point    Baby girl doing well  Breastfeeding.   Lochia stopped about 2 weeks ago  Birth control plan: OCP     [place]  DEPRESSION SCALE  I have been able to laugh and see the funny side of things: As much as I always could  I have looked forward with enjoyment to things: As much as I ever did  I have been anxious or worried for no good reason: No, not at all  I have felt scared or panicky for no good reason: No, not at all  Things have been getting on top of me: No, I have been coping as well as ever  I have been so unhappy that I have had difficulty sleeping: Not at all  I have felt sad or miserable: No, not at all  I have been so unhappy that I have been crying: No, never  The thought of harming myself has occurred to me: Never  Total: 0      Objective:     Vitals:    24 1354   BP: 122/80   Pulse: 74   Weight: 212 lb 6.4 oz (96.3 kg)   Height: 64\"         Body mass index is 36.46 kg/m².    General:  NAD  Abdomen: soft, nontender, no masses or hernia  Pelvic exam: normal appearing cervix, no abnormal discharge. uterus normally sized, mobile and nontender      Assessment & Plan:     Tammy Ramirez is a 33 year old  female who presents for postpartum visit     Diagnoses and all orders for this visit:    Postpartum care and examination (HCC)    Encounter for Papanicolaou smear for cervical cancer screening  -     ThinPrep PAP Smear; Future  -     Hpv Dna  High Risk , Thin Prep Collect; Future    Encounter for initial prescription of contraceptive pills  -     Norethindrone 0.35 MG Oral Tab; Take 1 tablet (0.35 mg total) by mouth daily.    History of gestational diabetes  -     Glucose Frederick 2 HR gestational; Future       As pt has had intermittent elevated Bps as high as 160s recommended taking labetalol 100mg twice daily, not just PRN.  D/w pt that if her Bps remain elevated at 12 weeks then would definitely be considered chronic HTN and would actually recommend she see PCP for management as labetalol not usually the first line choice outside of pregnancy    Discussed 2h GTT to screen for underlying T2DM    Contraception - progestin only pill rx, pt declines LARC methods    Pap - done, d/w pt if still abnormal would recommend another colposcopy

## 2024-08-20 LAB — HPV E6+E7 MRNA CVX QL NAA+PROBE: NEGATIVE

## 2024-08-21 ENCOUNTER — APPOINTMENT (OUTPATIENT)
Dept: GENERAL RADIOLOGY | Facility: HOSPITAL | Age: 34
End: 2024-08-21
Attending: EMERGENCY MEDICINE
Payer: MEDICAID

## 2024-08-21 ENCOUNTER — APPOINTMENT (OUTPATIENT)
Dept: CT IMAGING | Facility: HOSPITAL | Age: 34
End: 2024-08-21
Attending: EMERGENCY MEDICINE
Payer: MEDICAID

## 2024-08-21 ENCOUNTER — HOSPITAL ENCOUNTER (EMERGENCY)
Facility: HOSPITAL | Age: 34
Discharge: HOME | End: 2024-08-21
Attending: EMERGENCY MEDICINE
Payer: MEDICAID

## 2024-08-21 VITALS
WEIGHT: 209 LBS | HEIGHT: 64 IN | HEART RATE: 88 BPM | DIASTOLIC BLOOD PRESSURE: 96 MMHG | SYSTOLIC BLOOD PRESSURE: 147 MMHG | RESPIRATION RATE: 14 BRPM | OXYGEN SATURATION: 99 % | TEMPERATURE: 98 F | BODY MASS INDEX: 35.68 KG/M2

## 2024-08-21 DIAGNOSIS — R09.1 PLEURISY: Primary | ICD-10-CM

## 2024-08-21 LAB
ALBUMIN SERPL-MCNC: 5.2 G/DL (ref 3.2–4.8)
ALBUMIN/GLOB SERPL: 2 {RATIO} (ref 1–2)
ALP LIVER SERPL-CCNC: 124 U/L
ALT SERPL-CCNC: 29 U/L
ANION GAP SERPL CALC-SCNC: 6 MMOL/L (ref 0–18)
APTT PPP: 25.6 SECONDS (ref 23–36)
AST SERPL-CCNC: 23 U/L (ref ?–34)
BASOPHILS # BLD AUTO: 0.05 X10(3) UL (ref 0–0.2)
BASOPHILS NFR BLD AUTO: 0.6 %
BILIRUB SERPL-MCNC: 0.4 MG/DL (ref 0.3–1.2)
BILIRUB UR QL STRIP.AUTO: NEGATIVE
BUN BLD-MCNC: 12 MG/DL (ref 9–23)
CALCIUM BLD-MCNC: 10.7 MG/DL (ref 8.7–10.4)
CHLORIDE SERPL-SCNC: 106 MMOL/L (ref 98–112)
CLARITY UR REFRACT.AUTO: CLEAR
CO2 SERPL-SCNC: 26 MMOL/L (ref 21–32)
CREAT BLD-MCNC: 0.82 MG/DL
D DIMER PPP FEU-MCNC: 1.45 UG/ML FEU (ref ?–0.5)
EGFRCR SERPLBLD CKD-EPI 2021: 97 ML/MIN/1.73M2 (ref 60–?)
EOSINOPHIL # BLD AUTO: 0.08 X10(3) UL (ref 0–0.7)
EOSINOPHIL NFR BLD AUTO: 1 %
ERYTHROCYTE [DISTWIDTH] IN BLOOD BY AUTOMATED COUNT: 14.3 %
GLOBULIN PLAS-MCNC: 2.6 G/DL (ref 2–3.5)
GLUCOSE BLD-MCNC: 92 MG/DL (ref 70–99)
GLUCOSE UR STRIP.AUTO-MCNC: NORMAL MG/DL
HCT VFR BLD AUTO: 41.5 %
HGB BLD-MCNC: 14 G/DL
IMM GRANULOCYTES # BLD AUTO: 0.02 X10(3) UL (ref 0–1)
IMM GRANULOCYTES NFR BLD: 0.2 %
INR BLD: 1.06 (ref 0.8–1.2)
KETONES UR STRIP.AUTO-MCNC: NEGATIVE MG/DL
LEUKOCYTE ESTERASE UR QL STRIP.AUTO: NEGATIVE
LYMPHOCYTES # BLD AUTO: 2.27 X10(3) UL (ref 1–4)
LYMPHOCYTES NFR BLD AUTO: 27 %
MCH RBC QN AUTO: 28.6 PG (ref 26–34)
MCHC RBC AUTO-ENTMCNC: 33.7 G/DL (ref 31–37)
MCV RBC AUTO: 84.9 FL
MONOCYTES # BLD AUTO: 0.72 X10(3) UL (ref 0.1–1)
MONOCYTES NFR BLD AUTO: 8.6 %
NEUTROPHILS # BLD AUTO: 5.27 X10 (3) UL (ref 1.5–7.7)
NEUTROPHILS # BLD AUTO: 5.27 X10(3) UL (ref 1.5–7.7)
NEUTROPHILS NFR BLD AUTO: 62.6 %
NITRITE UR QL STRIP.AUTO: NEGATIVE
OSMOLALITY SERPL CALC.SUM OF ELEC: 285 MOSM/KG (ref 275–295)
PH UR STRIP.AUTO: 6 [PH] (ref 5–8)
PLATELET # BLD AUTO: 280 10(3)UL (ref 150–450)
POTASSIUM SERPL-SCNC: 3.8 MMOL/L (ref 3.5–5.1)
PROT SERPL-MCNC: 7.8 G/DL (ref 5.7–8.2)
PROTHROMBIN TIME: 13.8 SECONDS (ref 11.6–14.8)
RBC # BLD AUTO: 4.89 X10(6)UL
RBC UR QL AUTO: NEGATIVE
SODIUM SERPL-SCNC: 138 MMOL/L (ref 136–145)
SP GR UR STRIP.AUTO: >1.03 (ref 1–1.03)
TROPONIN I SERPL HS-MCNC: 4 NG/L
URATE SERPL-MCNC: 4.4 MG/DL
UROBILINOGEN UR STRIP.AUTO-MCNC: NORMAL MG/DL
WBC # BLD AUTO: 8.4 X10(3) UL (ref 4–11)

## 2024-08-21 PROCEDURE — 71045 X-RAY EXAM CHEST 1 VIEW: CPT | Performed by: EMERGENCY MEDICINE

## 2024-08-21 PROCEDURE — 85730 THROMBOPLASTIN TIME PARTIAL: CPT | Performed by: EMERGENCY MEDICINE

## 2024-08-21 PROCEDURE — 85025 COMPLETE CBC W/AUTO DIFF WBC: CPT | Performed by: EMERGENCY MEDICINE

## 2024-08-21 PROCEDURE — 84550 ASSAY OF BLOOD/URIC ACID: CPT | Performed by: EMERGENCY MEDICINE

## 2024-08-21 PROCEDURE — 96375 TX/PRO/DX INJ NEW DRUG ADDON: CPT

## 2024-08-21 PROCEDURE — 99285 EMERGENCY DEPT VISIT HI MDM: CPT

## 2024-08-21 PROCEDURE — 93005 ELECTROCARDIOGRAM TRACING: CPT

## 2024-08-21 PROCEDURE — 85379 FIBRIN DEGRADATION QUANT: CPT | Performed by: EMERGENCY MEDICINE

## 2024-08-21 PROCEDURE — 85610 PROTHROMBIN TIME: CPT | Performed by: EMERGENCY MEDICINE

## 2024-08-21 PROCEDURE — 96376 TX/PRO/DX INJ SAME DRUG ADON: CPT

## 2024-08-21 PROCEDURE — 93010 ELECTROCARDIOGRAM REPORT: CPT

## 2024-08-21 PROCEDURE — 85025 COMPLETE CBC W/AUTO DIFF WBC: CPT

## 2024-08-21 PROCEDURE — 81003 URINALYSIS AUTO W/O SCOPE: CPT | Performed by: EMERGENCY MEDICINE

## 2024-08-21 PROCEDURE — 80053 COMPREHEN METABOLIC PANEL: CPT

## 2024-08-21 PROCEDURE — 80053 COMPREHEN METABOLIC PANEL: CPT | Performed by: EMERGENCY MEDICINE

## 2024-08-21 PROCEDURE — 96374 THER/PROPH/DIAG INJ IV PUSH: CPT

## 2024-08-21 PROCEDURE — 84484 ASSAY OF TROPONIN QUANT: CPT | Performed by: EMERGENCY MEDICINE

## 2024-08-21 PROCEDURE — 71260 CT THORAX DX C+: CPT | Performed by: EMERGENCY MEDICINE

## 2024-08-21 RX ORDER — LABETALOL HYDROCHLORIDE 5 MG/ML
10 INJECTION, SOLUTION INTRAVENOUS ONCE
Status: COMPLETED | OUTPATIENT
Start: 2024-08-21 | End: 2024-08-21

## 2024-08-21 RX ORDER — KETOROLAC TROMETHAMINE 15 MG/ML
15 INJECTION, SOLUTION INTRAMUSCULAR; INTRAVENOUS ONCE
Status: COMPLETED | OUTPATIENT
Start: 2024-08-21 | End: 2024-08-21

## 2024-08-21 RX ORDER — CYCLOBENZAPRINE HCL 10 MG
10 TABLET ORAL 3 TIMES DAILY PRN
Qty: 20 TABLET | Refills: 0 | Status: SHIPPED | OUTPATIENT
Start: 2024-08-21 | End: 2024-08-28

## 2024-08-21 RX ORDER — HYDROMORPHONE HYDROCHLORIDE 1 MG/ML
0.5 INJECTION, SOLUTION INTRAMUSCULAR; INTRAVENOUS; SUBCUTANEOUS ONCE
Status: COMPLETED | OUTPATIENT
Start: 2024-08-21 | End: 2024-08-21

## 2024-08-21 RX ORDER — CYCLOBENZAPRINE HCL 10 MG
10 TABLET ORAL 3 TIMES DAILY PRN
Qty: 20 TABLET | Refills: 0 | Status: SHIPPED | OUTPATIENT
Start: 2024-08-21 | End: 2024-08-21

## 2024-08-21 RX ORDER — LABETALOL HYDROCHLORIDE 5 MG/ML
20 INJECTION, SOLUTION INTRAVENOUS ONCE
Status: COMPLETED | OUTPATIENT
Start: 2024-08-21 | End: 2024-08-21

## 2024-08-21 NOTE — ED INITIAL ASSESSMENT (HPI)
Pt c/o mid back pain and bilateral rib pain since midnight.  Denies injury.  Pt states had a similar episode 2 weeks ago but did not seek treatment at that time, states the pain self resolved with tylenol.  Pt 6 weeks post partum.  Pt states had pre-eclampsia with her pregnancy.

## 2024-08-22 LAB
ATRIAL RATE: 81 BPM
P AXIS: 28 DEGREES
P-R INTERVAL: 158 MS
Q-T INTERVAL: 372 MS
QRS DURATION: 96 MS
QTC CALCULATION (BEZET): 432 MS
R AXIS: 81 DEGREES
T AXIS: 59 DEGREES
VENTRICULAR RATE: 81 BPM

## 2024-08-22 NOTE — ED PROVIDER NOTES
Patient Seen in: [facility] Emergency Department      History     Chief Complaint   Patient presents with    Back Pain     Stated Complaint: mid back pain    Subjective:   HPI    33-year-old female complaining of back pain the patient has mid left thoracic back pain it radiates around underneath the breast.  And radiates to the anterior aspect of the chest this pain started last evening it has been fairly continuous it worsens with movement worsens with deep breaths she does feel some shortness of breath.  Denies any cough or fever she is 6 weeks postpartum she did have preeclampsia and had elevated blood pressure after delivery has been taking labetalol.  She denies any swelling in her legs abdominal pain headache vomiting fever cough no history of heart disease or history of pulmonary embolus or DVT.    Objective:   Past Medical History:    Anemia    Anxiety    Arthritis    Not sure of the date but i have arthritus in my neck    Bilateral occipital neuralgia    Calculus of kidney    About a year and a half ago    Cervical facet syndrome    Cervical high risk HPV (human papillomavirus) test positive    Pap neg, +HPV 18/45 in 1st trimester.    Chronic hypertension    Since at least 1/2022 per chart review, though patient denies this diagnosis.    Chronic hypertension with superimposed pre-eclampsia (HCC)    Diagnosed with superimposed pre-eclampsia without severe features (elevated 24h urine protein 5/30/24) for which she was induced. Developed severe features postpartum. IV magnesium sulfate. Discharged home nifedipine 60 mg BID & labetalol 100 BID.    Chronic migraine without aura, with intractable migraine, so stated, with status migrainosus    chronic since age 11    DDD (degenerative disc disease), cervical    MRI C spine 2020 - Mild multilevel disc desiccation and bulging without significant loss of intervertebral disc space height. No significant spinal canal or neuroforaminal stenosis.    Dysmenorrhea     Essential hypertension    CHTN    CHEYANNE (generalized anxiety disorder)    per CareEverywhere    Gestational diabetes (HCC)    Gestational diabetes requiring insulin (HCC)    Dx 16 wk with GDM. On insulin by 20 wk    History of echocardiogram    3/31/2021 TTE - LVEF 55-60%. Unremarkable. Done for h/o palpitations, PVCs    History of echocardiogram    3/13/24 TTE at 21+ wk LVEF 60-65%. No wall motion abnormality.  No significant valvular stenosis or regurgitation.    Insomnia    Meningitis (HCC)    mid 20s. They thought maybe mumps?    Menometrorrhagia    Migraine    Obesity (BMI 30-39.9)    Palpitations    Holter monitor 2021 minor PVCs. 3/2021 Echo LVEF 55-60%, unremarkable.    Screening for genetic disease carrier status    Invitae carrier screen NEGATIVE    Spinal headache    H/o spinal puncture headaches after epidurals              Past Surgical History:   Procedure Laterality Date    Colposcopy, cervix w/upper adjacent vagina; w/biopsy(s), cervix  02/07/2024    16w5d - cervical biopsy polypoid endocervical glandular tissue - Dr. Rock    Hc inject anesthetic agent greater occipital nerve  01/10/2024    Bilateral greater occipital nerve block - Depo-medrol & 1% lidocaine - Dr. Barnes. Done at approx 12 wk gestation    Lumbar puncture/spinal tap(bedside)      mid 20s. Dx meningitis    Other surgical history  07/2021 7/2021: cervical medial branch block C2-C5, seen in ED after procedure for dizziness, left sided facial numbness, worsening headache.    Tonsillectomy                  Social History     Socioeconomic History    Marital status: Single   Tobacco Use    Smoking status: Never    Smokeless tobacco: Never   Vaping Use    Vaping status: Never Used   Substance and Sexual Activity    Alcohol use: Never    Drug use: Not Currently     Types: Cannabis     Comment: Recreaction in the past. NOT current.    Sexual activity: Yes     Partners: Male     Birth control/protection: Condom   Other Topics Concern     Caffeine Concern Yes     Comment: soda occ    Exercise No     Social Determinants of Health     Financial Resource Strain: Low Risk  (7/1/2024)    Financial Resource Strain     Difficulty of Paying Living Expenses: Not hard at all     Med Affordability: No   Food Insecurity: No Food Insecurity (7/1/2024)    Food Insecurity     Food Insecurity: Never true   Transportation Needs: No Transportation Needs (7/1/2024)    Transportation Needs     Lack of Transportation: No     Car Seat: Yes   Stress: No Stress Concern Present (7/1/2024)    Stress     Feeling of Stress : No   Housing Stability: Low Risk  (7/1/2024)    Housing Stability     Housing Instability: No     Crib or Bassinette: Yes              Review of Systems    Positive for stated Chief Complaint: Back Pain    Other systems are as noted in HPI.  Constitutional and vital signs reviewed.      All other systems reviewed and negative except as noted above.    Physical Exam     ED Triage Vitals [08/21/24 1831]   BP (!) 151/98   Pulse 90   Resp 20   Temp 97.6 °F (36.4 °C)   Temp src Temporal   SpO2 99 %   O2 Device None (Room air)       Current Vitals:   Vital Signs  BP: (!) 147/96  Pulse: 88  Resp: 14  Temp: 97.6 °F (36.4 °C)  Temp src: Temporal    Oxygen Therapy  SpO2: 99 %  O2 Device: None (Room air)            Physical Exam    Patient is alert and orient x 3 no acute distress HEENT exam within normal limits neck is no lymphadenopathy or JVD lungs are clear cardiovascular Pa shows regular rate and rhythm without murmurs abdomen soft nontender the upper back and chest for nontender to palpation.  Extremities trace edema lower extremities the deep tendon reflexes are normal.  Neurologic exam is normal.    ED Course     Labs Reviewed   COMP METABOLIC PANEL (14) - Abnormal; Notable for the following components:       Result Value    Calcium, Total 10.7 (*)     Alkaline Phosphatase 124 (*)     Albumin 5.2 (*)     All other components within normal limits   D-DIMER -  Abnormal; Notable for the following components:    D-Dimer 1.45 (*)     All other components within normal limits   URINALYSIS WITH CULTURE REFLEX - Abnormal; Notable for the following components:    Spec Gravity >1.030 (*)     Protein Urine Trace (*)     All other components within normal limits   TROPONIN I HIGH SENSITIVITY - Normal   PROTHROMBIN TIME (PT) - Normal   PTT, ACTIVATED - Normal   URIC ACID - Normal   CBC WITH DIFFERENTIAL WITH PLATELET   RAINBOW DRAW LAVENDER   RAINBOW DRAW LIGHT GREEN   RAINBOW DRAW BLUE     EKG    Rate, intervals and axes as noted on EKG Report.  Rate: 81  Rhythm: Sinus Rhythm  Reading: Normal EKG                 Abnormal Labs Reviewed   COMP METABOLIC PANEL (14) - Abnormal; Notable for the following components:       Result Value    Calcium, Total 10.7 (*)     Alkaline Phosphatase 124 (*)     Albumin 5.2 (*)     All other components within normal limits   D-DIMER - Abnormal; Notable for the following components:    D-Dimer 1.45 (*)     All other components within normal limits   URINALYSIS WITH CULTURE REFLEX - Abnormal; Notable for the following components:    Spec Gravity >1.030 (*)     Protein Urine Trace (*)     All other components within normal limits     Labs showed elevated D-dimer 1.45  CT CHEST PE AORTA (IV ONLY) (CPT=71260)    Result Date: 8/21/2024  CONCLUSION:  There is no pulmonary embolism to the first subsegmental arterial level.   LOCATION:  [facility]   Dictated by (CST): Antelmo Flanagan MD on 8/21/2024 at 10:16 PM     Finalized by (CST): Antelmo Flanagan MD on 8/21/2024 at 10:19 PM       XR CHEST AP PORTABLE  (CPT=71045)    Result Date: 8/21/2024  CONCLUSION:  See above.   LOCATION:  [facility]      Dictated by (CST): Antelmo Flanagan MD on 8/21/2024 at 9:07 PM     Finalized by (CST): Antelmo Flanagan MD on 8/21/2024 at 9:08 PM      Images independent reviewed there is no pulmonary embolus or pneumonia         MDM      Initial differential diagnosis considered not limited to includes  musculoskeletal pain pleurisy pulmonary embolus pneumonia pneumothorax    Symptoms seem consistent with pleurisy or musculoskeletal pain she was filled better after pain medications in emergency department she was given Flexeril advised follow-up doctor to few days return if worse Case discussed with OB do not feel her elevated blood pressure is related to preeclampsia as she has had blood pressure issues ongoing                                   Medical Decision Making      Disposition and Plan     Clinical Impression:  1. Pleurisy         Disposition:  Discharge  8/21/2024 10:41 pm    Follow-up:  Landon Long MD  [address] [place] [id number]  [phone]    Follow up in 3 day(s)            Medications Prescribed:  Discharge Medication List as of 8/21/2024 10:42 PM        START taking these medications    Details   cyclobenzaprine 10 MG Oral Tab Take 1 tablet (10 mg total) by mouth 3 (three) times daily as needed for Muscle spasms., Normal, Disp-20 tablet, R-0

## 2024-08-22 NOTE — DISCHARGE INSTRUCTIONS
Use a heating pad 20 minutes 4 times a day Advil for milder pain or Tylenol.  For more severe pain take Flexeril follow-up with your doctor next 2 days return if problems continue the labetalol and nicardipine.

## 2024-08-23 LAB
.: NORMAL
.: NORMAL

## 2024-09-04 DIAGNOSIS — G43.719 INTRACTABLE CHRONIC MIGRAINE WITHOUT AURA AND WITHOUT STATUS MIGRAINOSUS: ICD-10-CM

## 2024-09-04 RX ORDER — NARATRIPTAN 2.5 MG/1
2.5 TABLET ORAL AS NEEDED
Qty: 18 TABLET | Refills: 0 | Status: SHIPPED | OUTPATIENT
Start: 2024-09-04

## 2024-09-13 ENCOUNTER — PATIENT MESSAGE (OUTPATIENT)
Dept: FAMILY MEDICINE CLINIC | Facility: CLINIC | Age: 34
End: 2024-09-13

## 2024-09-13 DIAGNOSIS — G43.719 INTRACTABLE CHRONIC MIGRAINE WITHOUT AURA AND WITHOUT STATUS MIGRAINOSUS: ICD-10-CM

## 2024-09-16 NOTE — TELEPHONE ENCOUNTER
From: Tammy Ramirez  To: Landon Long  Sent: 9/13/2024 2:29 PM CDT  Subject: Naratriptan     Good afternoon,  My neurologist had prescribed me sumatriptan to see if it would work instead of my usual naratriptan because I'm breast feeding and sumatritan is supposed to have a shorter half life so I can resume breastfeeding sooner. However it doesn't work as well as the naratriptan. I had requested a refill of the naratriptan and the pharmacy is stating they must speak with you before pushing it through. The last time I had my naratriptan filled was almost 2 months ago.

## 2024-09-18 ENCOUNTER — OFFICE VISIT (OUTPATIENT)
Dept: FAMILY MEDICINE CLINIC | Facility: CLINIC | Age: 34
End: 2024-09-18
Payer: MEDICAID

## 2024-09-18 VITALS
RESPIRATION RATE: 18 BRPM | BODY MASS INDEX: 36 KG/M2 | DIASTOLIC BLOOD PRESSURE: 70 MMHG | SYSTOLIC BLOOD PRESSURE: 106 MMHG | HEIGHT: 64 IN

## 2024-09-18 DIAGNOSIS — R07.81 RIB PAIN: Primary | ICD-10-CM

## 2024-09-18 PROCEDURE — 99213 OFFICE O/P EST LOW 20 MIN: CPT | Performed by: FAMILY MEDICINE

## 2024-09-18 RX ORDER — LABETALOL 100 MG/1
100 TABLET, FILM COATED ORAL DAILY
COMMUNITY

## 2024-09-18 NOTE — PROGRESS NOTES
[facility] Progress Note    SUBJECTIVE: Tammy Ramirez 34 year old female is here today for   Chief Complaint   Patient presents with    Hospital F/U     Pt reports feeling better since the ER. Pt reports not rib cage since the ER. Pt thinks carrying the baby may have inflamed her ribs so she has reduced carrying time.       Was seen in August in ER.    A month ago had a couple of episodes where ribg cage felt burning pain, both sides in center of chest worse on left.  Tried warm compresses, tylenol, and bath tub time and improved. Then happened a couple of other times. Then had an episode where nothing helped, seen at immediate care, and sent to ER due to breathing being labored    Ruled out worrisome issues.    Hasn't had an episode since, but has avoided carrying her baby as much, but  is helpign more now while on leave    Baby born on July 2nd.    Migraines disappeared almost completely in second trimester maybe 5 mild ones (major reduction for her)    After delivery, even in the hospital had a couple, and now getting more frequent again.    Taking less naratriptan due to breast feeding    I've recommended ibuprofen for the possible pain/rib pain    Had preeclampsia in late pregnancy.    Has been taking labetalol once a day for htn. Usually her BP is ine with occasional spikes        PMH  Past Medical History:    Anemia    Anxiety    Arthritis    Not sure of the date but i have arthritus in my neck    Bilateral occipital neuralgia    Calculus of kidney    About a year and a half ago    Cervical facet syndrome    Cervical high risk HPV (human papillomavirus) test positive    Pap neg, +HPV 18/45 in 1st trimester.    Chronic hypertension    Since at least 1/2022 per chart review, though patient denies this diagnosis.    Chronic hypertension with superimposed pre-eclampsia (HCC)    Diagnosed with superimposed pre-eclampsia without severe features (elevated 24h urine protein 5/30/24) for which she  was induced. Developed severe features postpartum. IV magnesium sulfate. Discharged home nifedipine 60 mg BID & labetalol 100 BID.    Chronic migraine without aura, with intractable migraine, so stated, with status migrainosus    chronic since age 11    DDD (degenerative disc disease), cervical    MRI C spine 2020 - Mild multilevel disc desiccation and bulging without significant loss of intervertebral disc space height. No significant spinal canal or neuroforaminal stenosis.    Dysmenorrhea    Essential hypertension    CHTN    CHEYANNE (generalized anxiety disorder)    per CareEverywhere    Gestational diabetes (HCC)    Gestational diabetes requiring insulin (HCC)    Dx 16 wk with GDM. On insulin by 20 wk    History of echocardiogram    3/31/2021 TTE - LVEF 55-60%. Unremarkable. Done for h/o palpitations, PVCs    History of echocardiogram    3/13/24 TTE at 21+ wk LVEF 60-65%. No wall motion abnormality.  No significant valvular stenosis or regurgitation.    Insomnia    Meningitis (HCC)    mid 20s. They thought maybe mumps?    Menometrorrhagia    Migraine    Obesity (BMI 30-39.9)    Palpitations    Holter monitor 2021 minor PVCs. 3/2021 Echo LVEF 55-60%, unremarkable.    Screening for genetic disease carrier status    Invitae carrier screen NEGATIVE    Spinal headache    H/o spinal puncture headaches after epidurals        PSH  Past Surgical History:   Procedure Laterality Date    Colposcopy, cervix w/upper adjacent vagina; w/biopsy(s), cervix  02/07/2024    16w5d - cervical biopsy polypoid endocervical glandular tissue - Dr. Rock     inject anesthetic agent greater occipital nerve  01/10/2024    Bilateral greater occipital nerve block - Depo-medrol & 1% lidocaine - Dr. Barnes. Done at approx 12 wk gestation    Lumbar puncture/spinal tap(bedside)      mid 20s. Dx meningitis    Other surgical history  07/2021 7/2021: cervical medial branch block C2-C5, seen in ED after procedure for dizziness, left sided facial  numbness, worsening headache.    Tonsillectomy          Social Hx:  No changes    ROS  See HPI    OBJECTIVE:  /70   Resp 18   Ht 5' 4\" (1.626 m)   LMP 09/22/2023 (Exact Date)   BMI 35.87 kg/m²           Labs:          Meds:   Current Outpatient Medications   Medication Sig Dispense Refill    labetalol 100 MG Oral Tab Take 1 tablet (100 mg total) by mouth daily.      Naratriptan HCl 2.5 MG Oral Tab Take 1 tablet (2.5 mg total) by mouth as needed for Migraine. 18 tablet 0    Norethindrone 0.35 MG Oral Tab Take 1 tablet (0.35 mg total) by mouth daily. 84 tablet 4    Cholecalciferol (VITAMIN D) 50 MCG (2000 UT) Oral Cap Take by mouth 2 (two) times a day.      Magnesium 200 MG Oral Tab Take 2 tablets (400 mg total) by mouth daily.      Prenatal MV-Min-Fe Fum-FA-DHA (PRENATAL 1 OR) Take by mouth. (Patient not taking: Reported on 9/18/2024)           Assessment/Plan  Tammy was seen today for hospital f/u.    Diagnoses and all orders for this visit:    Rib pain         As has mostly resolved, ok to watch and wait ok to use ibuprofen to help with pain if recurs, low risk of concern with breast feeding     Migraines returning, and when done breastfeeding would plan to restart qulipta as had worked well for short time on it    Total Time spent with patient and coordinating care:  25 minutes.    Follow up: as needed for well care      Landon Long MD

## 2024-09-28 ENCOUNTER — LABORATORY ENCOUNTER (OUTPATIENT)
Dept: LAB | Facility: HOSPITAL | Age: 34
End: 2024-09-28
Attending: STUDENT IN AN ORGANIZED HEALTH CARE EDUCATION/TRAINING PROGRAM
Payer: MEDICAID

## 2024-09-28 DIAGNOSIS — Z86.32 HISTORY OF GESTATIONAL DIABETES: ICD-10-CM

## 2024-09-28 DIAGNOSIS — O14.93 PRE-ECLAMPSIA IN THIRD TRIMESTER (HCC): ICD-10-CM

## 2024-09-28 LAB
ALBUMIN SERPL-MCNC: 4.9 G/DL (ref 3.2–4.8)
ALBUMIN/GLOB SERPL: 1.8 {RATIO} (ref 1–2)
ALP LIVER SERPL-CCNC: 119 U/L
ALT SERPL-CCNC: 28 U/L
ANION GAP SERPL CALC-SCNC: 8 MMOL/L (ref 0–18)
AST SERPL-CCNC: 19 U/L (ref ?–34)
BASOPHILS # BLD AUTO: 0.03 X10(3) UL (ref 0–0.2)
BASOPHILS NFR BLD AUTO: 0.3 %
BILIRUB SERPL-MCNC: 0.4 MG/DL (ref 0.3–1.2)
BUN BLD-MCNC: 11 MG/DL (ref 9–23)
CALCIUM BLD-MCNC: 10 MG/DL (ref 8.7–10.4)
CHLORIDE SERPL-SCNC: 106 MMOL/L (ref 98–112)
CO2 SERPL-SCNC: 24 MMOL/L (ref 21–32)
CREAT BLD-MCNC: 0.68 MG/DL
EGFRCR SERPLBLD CKD-EPI 2021: 118 ML/MIN/1.73M2 (ref 60–?)
EOSINOPHIL # BLD AUTO: 0.06 X10(3) UL (ref 0–0.7)
EOSINOPHIL NFR BLD AUTO: 0.6 %
ERYTHROCYTE [DISTWIDTH] IN BLOOD BY AUTOMATED COUNT: 14.2 %
FASTING STATUS PATIENT QL REPORTED: YES
GLOBULIN PLAS-MCNC: 2.7 G/DL (ref 2–3.5)
GLUCOSE 1H P GLC SERPL-MCNC: 164 MG/DL
GLUCOSE 2H P GLC SERPL-MCNC: 121 MG/DL
GLUCOSE BLD-MCNC: 96 MG/DL (ref 70–99)
GLUCOSE P FAST SERPL-MCNC: 96 MG/DL
HCT VFR BLD AUTO: 40.3 %
HGB BLD-MCNC: 13.5 G/DL
IMM GRANULOCYTES # BLD AUTO: 0.04 X10(3) UL (ref 0–1)
IMM GRANULOCYTES NFR BLD: 0.4 %
LYMPHOCYTES # BLD AUTO: 1.55 X10(3) UL (ref 1–4)
LYMPHOCYTES NFR BLD AUTO: 15.1 %
MCH RBC QN AUTO: 29.5 PG (ref 26–34)
MCHC RBC AUTO-ENTMCNC: 33.5 G/DL (ref 31–37)
MCV RBC AUTO: 88 FL
MONOCYTES # BLD AUTO: 0.67 X10(3) UL (ref 0.1–1)
MONOCYTES NFR BLD AUTO: 6.5 %
NEUTROPHILS # BLD AUTO: 7.91 X10 (3) UL (ref 1.5–7.7)
NEUTROPHILS # BLD AUTO: 7.91 X10(3) UL (ref 1.5–7.7)
NEUTROPHILS NFR BLD AUTO: 77.1 %
OSMOLALITY SERPL CALC.SUM OF ELEC: 285 MOSM/KG (ref 275–295)
PLATELET # BLD AUTO: 238 10(3)UL (ref 150–450)
POTASSIUM SERPL-SCNC: 4.6 MMOL/L (ref 3.5–5.1)
PROT SERPL-MCNC: 7.6 G/DL (ref 5.7–8.2)
RBC # BLD AUTO: 4.58 X10(6)UL
SODIUM SERPL-SCNC: 138 MMOL/L (ref 136–145)
WBC # BLD AUTO: 10.3 X10(3) UL (ref 4–11)

## 2024-09-28 PROCEDURE — 80053 COMPREHEN METABOLIC PANEL: CPT

## 2024-09-28 PROCEDURE — 36415 COLL VENOUS BLD VENIPUNCTURE: CPT

## 2024-09-28 PROCEDURE — 82951 GLUCOSE TOLERANCE TEST (GTT): CPT

## 2024-09-28 PROCEDURE — 85025 COMPLETE CBC W/AUTO DIFF WBC: CPT

## 2024-10-11 ENCOUNTER — PATIENT MESSAGE (OUTPATIENT)
Facility: CLINIC | Age: 34
End: 2024-10-11

## 2024-10-11 RX ORDER — DROSPIRENONE 4 MG/1
1 TABLET, FILM COATED ORAL DAILY
Qty: 84 TABLET | Refills: 3 | Status: SHIPPED | OUTPATIENT
Start: 2024-10-11 | End: 2024-11-08

## 2024-10-14 DIAGNOSIS — G43.719 INTRACTABLE CHRONIC MIGRAINE WITHOUT AURA AND WITHOUT STATUS MIGRAINOSUS: ICD-10-CM

## 2024-10-14 RX ORDER — NARATRIPTAN 2.5 MG/1
2.5 TABLET ORAL AS NEEDED
Qty: 18 TABLET | Refills: 0 | Status: SHIPPED | OUTPATIENT
Start: 2024-10-14

## 2024-11-10 ENCOUNTER — PATIENT MESSAGE (OUTPATIENT)
Dept: FAMILY MEDICINE CLINIC | Facility: CLINIC | Age: 34
End: 2024-11-10

## 2024-11-10 DIAGNOSIS — G43.719 INTRACTABLE CHRONIC MIGRAINE WITHOUT AURA AND WITHOUT STATUS MIGRAINOSUS: ICD-10-CM

## 2024-11-11 RX ORDER — NARATRIPTAN 2.5 MG/1
5 TABLET ORAL AS NEEDED
Qty: 36 TABLET | Refills: 0 | Status: SHIPPED | OUTPATIENT
Start: 2024-11-11

## 2024-11-11 NOTE — TELEPHONE ENCOUNTER
Pt requesting increase on    Requested Prescriptions     Pending Prescriptions Disp Refills    Naratriptan HCl 2.5 MG Oral Tab 18 tablet 0     Sig: Take 1 tablet (2.5 mg total) by mouth as needed for Migraine.     Needs to take 2 to help migraine   Pended with request increase     Requested Prescriptions     Pending Prescriptions Disp Refills    Naratriptan HCl 2.5 MG Oral Tab 36 tablet 0     Sig: Take 2 tablets (5 mg total) by mouth as needed for Migraine.

## 2024-11-12 ENCOUNTER — TELEPHONE (OUTPATIENT)
Dept: FAMILY MEDICINE CLINIC | Facility: CLINIC | Age: 34
End: 2024-11-12

## 2024-11-12 NOTE — TELEPHONE ENCOUNTER
I'm ok with the increase, but insurance may not cover, as they may have already assumed 2.5 mg twice, and then allotted 9 times per month, which is common with migraine medications.    May want to consider neurology follow up    Landon Long MD

## 2024-11-12 NOTE — TELEPHONE ENCOUNTER
I don't remember changing quantity, ok with 18 tablets for 1 month supply or 54 for 3 month supply    Landon Long MD

## 2024-11-12 NOTE — TELEPHONE ENCOUNTER
Pharmacy wanting to clarify quantity of Naratriptan. #36 sent.  Previous quantity #18- they are wanting to know if this should be a 90 day supply or error?

## 2024-11-13 ENCOUNTER — TELEPHONE (OUTPATIENT)
Dept: FAMILY MEDICINE CLINIC | Facility: CLINIC | Age: 34
End: 2024-11-13

## 2024-11-13 NOTE — TELEPHONE ENCOUNTER
Spoke to pharmacy and patient. Instructed pharmacy this is for 1 month and pt will pay out of pocket for what is not covered.

## 2024-11-13 NOTE — TELEPHONE ENCOUNTER
Pt said pharmacy giving her only #12 of the Naratriptan HCl 2.5 MG and it should be #18 for one month.  Pls advise

## 2024-11-27 ENCOUNTER — PATIENT MESSAGE (OUTPATIENT)
Dept: FAMILY MEDICINE CLINIC | Facility: CLINIC | Age: 34
End: 2024-11-27

## 2024-12-02 NOTE — TELEPHONE ENCOUNTER
Spoke to patient and she states she has increased anxiety for the last several months.Patient also complaining of some depression.Patient states she is 5 months post partum.  Patient states she has had suicidal ideations in the past but denies any at present time.  Pt denies any homicidal ideations.  Office visit here please advise?

## 2024-12-24 DIAGNOSIS — G43.719 INTRACTABLE CHRONIC MIGRAINE WITHOUT AURA AND WITHOUT STATUS MIGRAINOSUS: ICD-10-CM

## 2024-12-27 RX ORDER — NARATRIPTAN 2.5 MG/1
5 TABLET ORAL AS NEEDED
Qty: 36 TABLET | Refills: 0 | Status: SHIPPED | OUTPATIENT
Start: 2024-12-27

## 2025-01-14 ENCOUNTER — TELEMEDICINE (OUTPATIENT)
Dept: FAMILY MEDICINE CLINIC | Facility: CLINIC | Age: 35
End: 2025-01-14
Payer: MEDICAID

## 2025-01-14 DIAGNOSIS — G43.711 CHRONIC MIGRAINE WITHOUT AURA, WITH INTRACTABLE MIGRAINE, SO STATED, WITH STATUS MIGRAINOSUS: Primary | ICD-10-CM

## 2025-01-14 DIAGNOSIS — F32.A ANXIETY AND DEPRESSION: ICD-10-CM

## 2025-01-14 DIAGNOSIS — F41.9 ANXIETY AND DEPRESSION: ICD-10-CM

## 2025-01-14 PROCEDURE — 98004 SYNCH AUDIO-VIDEO EST SF 10: CPT | Performed by: FAMILY MEDICINE

## 2025-01-14 RX ORDER — GABAPENTIN 300 MG/1
300 CAPSULE ORAL 3 TIMES DAILY
Qty: 90 CAPSULE | Refills: 1 | Status: SHIPPED | OUTPATIENT
Start: 2025-01-14

## 2025-01-14 NOTE — PROGRESS NOTES
This visit is conducted using Telemedicine with live, interactive video and audio.    Patient has been referred to the [facility] website at [email or web address] to review the yearly Consent to Treat document.    Patient understands and accepts financial responsibility for any deductible, co-insurance and/or co-pays associated with this service.  [facility] Progress Note    SUBJECTIVE: Tammy Ramirez 34 year old female is here today for No chief complaint on file.      Tammy calls in today, following up on depression.    Feels like the zoloft has helped with the depression. Negative thoughts have dissipated took about 2-3 weeks. Not as effective on anxiety. Still having panic attacks.    Still having panic attacks. Had given short course of alprazolam, and hasn't been highly effective.    Has stopped breast feeding, due to goal to restart migraine prevention medication    Brother did well with gabapentin.    Has had trouble sleeping, high anxiety    PMH  Past Medical History:    Anemia    Anxiety    Arthritis    Not sure of the date but i have arthritus in my neck    Bilateral occipital neuralgia    Calculus of kidney    About a year and a half ago    Cervical facet syndrome    Cervical high risk HPV (human papillomavirus) test positive    Pap neg, +HPV 18/45 in 1st trimester.    Chronic hypertension    Since at least 1/2022 per chart review, though patient denies this diagnosis.    Chronic hypertension with superimposed pre-eclampsia (HCC)    Diagnosed with superimposed pre-eclampsia without severe features (elevated 24h urine protein 5/30/24) for which she was induced. Developed severe features postpartum. IV magnesium sulfate. Discharged home nifedipine 60 mg BID & labetalol 100 BID.    Chronic migraine without aura, with intractable migraine, so stated, with status migrainosus    chronic since age 11    DDD (degenerative disc disease), cervical    MRI C spine 2020 - Mild multilevel disc desiccation  and bulging without significant loss of intervertebral disc space height. No significant spinal canal or neuroforaminal stenosis.    Dysmenorrhea    Essential hypertension    CHTN    CHEYANNE (generalized anxiety disorder)    per CareEverywhere    Gestational diabetes (HCC)    Gestational diabetes requiring insulin (HCC)    Dx 16 wk with GDM. On insulin by 20 wk    History of echocardiogram    3/31/2021 TTE - LVEF 55-60%. Unremarkable. Done for h/o palpitations, PVCs    History of echocardiogram    3/13/24 TTE at 21+ wk LVEF 60-65%. No wall motion abnormality.  No significant valvular stenosis or regurgitation.    Insomnia    Meningitis (HCC)    mid 20s. They thought maybe mumps?    Menometrorrhagia    Migraine    Obesity (BMI 30-39.9)    Palpitations    Holter monitor 2021 minor PVCs. 3/2021 Echo LVEF 55-60%, unremarkable.    Screening for genetic disease carrier status    Invitae carrier screen NEGATIVE    Spinal headache    H/o spinal puncture headaches after epidurals        PSH  Past Surgical History:   Procedure Laterality Date    Colposcopy, cervix w/upper adjacent vagina; w/biopsy(s), cervix  02/07/2024    16w5d - cervical biopsy polypoid endocervical glandular tissue - Dr. Rock    Hc inject anesthetic agent greater occipital nerve  01/10/2024    Bilateral greater occipital nerve block - Depo-medrol & 1% lidocaine - Dr. Barnes. Done at approx 12 wk gestation    Lumbar puncture/spinal tap(bedside)      mid 20s. Dx meningitis    Other surgical history  07/2021 7/2021: cervical medial branch block C2-C5, seen in ED after procedure for dizziness, left sided facial numbness, worsening headache.    Tonsillectomy          Social Hx:  See HPI    ROS  See HPI    OBJECTIVE:  There were no vitals taken for this visit.        Labs:          Meds:   Current Outpatient Medications   Medication Sig Dispense Refill    Atogepant 60 MG Oral Tab Take 60 mg by mouth daily. 30 tablet 1    gabapentin 300 MG Oral Cap Take 1  capsule (300 mg total) by mouth 3 (three) times daily. 90 capsule 1    traZODone 100 MG Oral Tab Take 0.5 tablets (50 mg total) by mouth nightly as needed for Sleep. 30 tablet 1    Naratriptan HCl 2.5 MG Oral Tab Take 2 tablets (5 mg total) by mouth as needed for Migraine. 36 tablet 0    sertraline 100 MG Oral Tab Take 1 tablet (100 mg total) by mouth daily. 30 tablet 1    Drospirenone (SLYND) 4 MG Oral Tab Take 1 tablet by mouth daily for 28 days. 84 tablet 3         Assessment/Plan  Diagnoses and all orders for this visit:    Chronic migraine without aura, with intractable migraine, so stated, with status migrainosus  -     Atogepant 60 MG Oral Tab; Take 60 mg by mouth daily.    Anxiety and depression  -     gabapentin 300 MG Oral Cap; Take 1 capsule (300 mg total) by mouth 3 (three) times daily.         Will add gabapentin, and plan on stopping alprazolam.    Potentially can stop traozodone as well, for ongoing migraines, restart qulipta as had responded well at beginning of course last year, but had to stop due to pregnancy         Total Time spent with patient and coordinating care:  12 minutes.    Follow up: 1-2 months      Landon Long MD

## 2025-01-16 ENCOUNTER — PATIENT MESSAGE (OUTPATIENT)
Dept: FAMILY MEDICINE CLINIC | Facility: CLINIC | Age: 35
End: 2025-01-16

## 2025-01-16 ENCOUNTER — TELEPHONE (OUTPATIENT)
Dept: FAMILY MEDICINE CLINIC | Facility: CLINIC | Age: 35
End: 2025-01-16

## 2025-01-16 DIAGNOSIS — G43.711 CHRONIC MIGRAINE WITHOUT AURA, WITH INTRACTABLE MIGRAINE, SO STATED, WITH STATUS MIGRAINOSUS: Primary | ICD-10-CM

## 2025-01-16 NOTE — TELEPHONE ENCOUNTER
Atogepant 60 MG Oral Tab 30 tablet 1 1/14/2025 --    Sig - Route: Take 60 mg by mouth daily. - Oral    Sent to pharmacy as: Atogepant 60 MG Oral Tablet    E-Prescribing Status: Receipt confirmed by pharmacy (1/15/2025  9:54 AM CST)    Prior authorization: [place]

## 2025-01-16 NOTE — TELEPHONE ENCOUNTER
Do we have reasoning, Tammy has failed many treatment plans/options.    Previously it came from neurology, would that help it get through for a specialist to recommend?    Landon Long MD

## 2025-01-16 NOTE — TELEPHONE ENCOUNTER
We can try to resubmit noting pt was on in past with relief and had to stop d/t pregnancy and wants to resume. Also will send ofc notes. Will re-generate PA and see if we can get overturned.     Await respose

## 2025-01-31 ENCOUNTER — PATIENT MESSAGE (OUTPATIENT)
Dept: FAMILY MEDICINE CLINIC | Facility: CLINIC | Age: 35
End: 2025-01-31

## 2025-01-31 DIAGNOSIS — F32.A ANXIETY AND DEPRESSION: ICD-10-CM

## 2025-01-31 DIAGNOSIS — F41.9 ANXIETY AND DEPRESSION: ICD-10-CM

## 2025-01-31 RX ORDER — SERTRALINE HYDROCHLORIDE 100 MG/1
100 TABLET, FILM COATED ORAL DAILY
Qty: 30 TABLET | Refills: 1 | Status: SHIPPED | OUTPATIENT
Start: 2025-01-31

## 2025-01-31 RX ORDER — GABAPENTIN 300 MG/1
CAPSULE ORAL
COMMUNITY
Start: 2025-01-31

## 2025-01-31 RX ORDER — DROSPIRENONE 4 MG/1
1 TABLET, FILM COATED ORAL DAILY
Qty: 84 TABLET | Refills: 3 | OUTPATIENT
Start: 2025-01-31 | End: 2025-02-28

## 2025-01-31 NOTE — TELEPHONE ENCOUNTER
Let's try increasing one of the doses to 600 mg, and the others remain at 300 mg, and can gradually change over weeks to avoid overdoing the dose.    Landon Long MD

## 2025-02-02 DIAGNOSIS — G43.719 INTRACTABLE CHRONIC MIGRAINE WITHOUT AURA AND WITHOUT STATUS MIGRAINOSUS: ICD-10-CM

## 2025-02-03 RX ORDER — NARATRIPTAN 2.5 MG/1
5 TABLET ORAL AS NEEDED
Qty: 18 TABLET | Refills: 0 | Status: SHIPPED | OUTPATIENT
Start: 2025-02-03

## 2025-02-15 ENCOUNTER — PATIENT MESSAGE (OUTPATIENT)
Dept: FAMILY MEDICINE CLINIC | Facility: CLINIC | Age: 35
End: 2025-02-15

## 2025-02-17 NOTE — TELEPHONE ENCOUNTER
Could cut in half for 2 weeks, and see how she feels, and if ok could then try stopping the medication    Landon Long MD

## 2025-02-18 ENCOUNTER — PATIENT MESSAGE (OUTPATIENT)
Dept: FAMILY MEDICINE CLINIC | Facility: CLINIC | Age: 35
End: 2025-02-18

## 2025-02-18 DIAGNOSIS — G43.719 INTRACTABLE CHRONIC MIGRAINE WITHOUT AURA AND WITHOUT STATUS MIGRAINOSUS: ICD-10-CM

## 2025-02-18 RX ORDER — NARATRIPTAN 2.5 MG/1
5 TABLET ORAL AS NEEDED
Qty: 18 TABLET | Refills: 0 | Status: SHIPPED | OUTPATIENT
Start: 2025-02-18

## 2025-02-19 ENCOUNTER — TELEPHONE (OUTPATIENT)
Dept: FAMILY MEDICINE CLINIC | Facility: CLINIC | Age: 35
End: 2025-02-19

## 2025-02-19 RX ORDER — NARATRIPTAN 2.5 MG/1
TABLET ORAL
Qty: 18 TABLET | Refills: 0 | Status: SHIPPED | OUTPATIENT
Start: 2025-02-19

## 2025-02-19 NOTE — TELEPHONE ENCOUNTER
Pharmacy said they will need another rx for #36 of the Naratriptan HCl 2.5 MG to keep pt \"on schedule\"   Also, needs documentation for increase.

## 2025-02-19 NOTE — TELEPHONE ENCOUNTER
Ok for further amount, I don't necessarily have ability to change insurance response, but I am ok with more    Landon Long MD

## 2025-02-20 NOTE — TELEPHONE ENCOUNTER
Rx was sent 2/18 and yesterday for #18, this is a prn med. Will refill when pt is running low and when she requests refill vs the pharm

## 2025-02-26 DIAGNOSIS — G43.719 INTRACTABLE CHRONIC MIGRAINE WITHOUT AURA AND WITHOUT STATUS MIGRAINOSUS: ICD-10-CM

## 2025-02-26 RX ORDER — NARATRIPTAN 2.5 MG/1
5 TABLET ORAL AS NEEDED
Qty: 36 TABLET | Refills: 0 | Status: SHIPPED | OUTPATIENT
Start: 2025-02-26

## 2025-03-03 RX ORDER — DROSPIRENONE 4 MG/1
1 TABLET, FILM COATED ORAL DAILY
Qty: 84 TABLET | Refills: 0 | Status: SHIPPED | OUTPATIENT
Start: 2025-03-03 | End: 2025-03-31

## 2025-03-06 DIAGNOSIS — G43.719 INTRACTABLE CHRONIC MIGRAINE WITHOUT AURA AND WITHOUT STATUS MIGRAINOSUS: ICD-10-CM

## 2025-03-06 RX ORDER — NARATRIPTAN 2.5 MG/1
5 TABLET ORAL AS NEEDED
Qty: 36 TABLET | Refills: 0 | Status: SHIPPED | OUTPATIENT
Start: 2025-03-06

## 2025-03-11 ENCOUNTER — TELEPHONE (OUTPATIENT)
Dept: FAMILY MEDICINE CLINIC | Facility: CLINIC | Age: 35
End: 2025-03-11

## 2025-03-11 ENCOUNTER — PATIENT MESSAGE (OUTPATIENT)
Dept: FAMILY MEDICINE CLINIC | Facility: CLINIC | Age: 35
End: 2025-03-11

## 2025-03-11 DIAGNOSIS — F32.A ANXIETY AND DEPRESSION: ICD-10-CM

## 2025-03-11 DIAGNOSIS — F41.9 ANXIETY AND DEPRESSION: ICD-10-CM

## 2025-03-11 DIAGNOSIS — G43.719 INTRACTABLE CHRONIC MIGRAINE WITHOUT AURA AND WITHOUT STATUS MIGRAINOSUS: ICD-10-CM

## 2025-03-11 RX ORDER — GABAPENTIN 300 MG/1
CAPSULE ORAL
Qty: 270 CAPSULE | Refills: 0 | Status: SHIPPED | OUTPATIENT
Start: 2025-03-11 | End: 2025-03-17

## 2025-03-11 RX ORDER — GABAPENTIN 300 MG/1
CAPSULE ORAL
Qty: 270 CAPSULE | Refills: 0 | Status: SHIPPED | OUTPATIENT
Start: 2025-03-11 | End: 2025-03-11

## 2025-03-11 RX ORDER — NARATRIPTAN 2.5 MG/1
5 TABLET ORAL AS NEEDED
Qty: 36 TABLET | Refills: 0 | Status: SHIPPED | OUTPATIENT
Start: 2025-03-11

## 2025-03-11 NOTE — TELEPHONE ENCOUNTER
See [facility] message.  Pt said [facility] will not fill the naratriptan.  She's asking for both naratriptan and gabapentin to be sent to the [facility] on [address].  She does not want to deal with [facility] pharmacy any further.

## 2025-03-12 DIAGNOSIS — F41.9 ANXIETY AND DEPRESSION: ICD-10-CM

## 2025-03-12 DIAGNOSIS — F32.A ANXIETY AND DEPRESSION: ICD-10-CM

## 2025-03-12 NOTE — TELEPHONE ENCOUNTER
Please clarify Gabapentin dose instructions    Patient Comment: Please have the quantity or dosage updated as the dr has told me to take 600mgs twice a day and 300mgs once a day

## 2025-03-12 NOTE — TELEPHONE ENCOUNTER
Ok to write for 600 twice a day and 300 mg midday, may increase to 600 mg TID longer term.    Landon Long MD

## 2025-03-17 RX ORDER — GABAPENTIN 300 MG/1
CAPSULE ORAL
Qty: 450 CAPSULE | Refills: 0 | Status: SHIPPED | OUTPATIENT
Start: 2025-03-17

## 2025-03-17 NOTE — TELEPHONE ENCOUNTER
Gabapentin 300mg- take 600mg qam, 300mg midday, 600mg qpm  Clarified with pharmacy, they are requesting new script be sent with correct directions. Please approve

## 2025-03-22 ENCOUNTER — PATIENT MESSAGE (OUTPATIENT)
Dept: OBGYN CLINIC | Facility: CLINIC | Age: 35
End: 2025-03-22

## 2025-03-26 ENCOUNTER — TELEPHONE (OUTPATIENT)
Facility: CLINIC | Age: 35
End: 2025-03-26

## 2025-03-26 DIAGNOSIS — N92.1 MENORRHAGIA WITH IRREGULAR CYCLE: Primary | ICD-10-CM

## 2025-03-26 RX ORDER — NORETHINDRONE 5 MG/1
2.5 TABLET ORAL NIGHTLY
Qty: 45 TABLET | Refills: 3 | Status: SHIPPED | OUTPATIENT
Start: 2025-03-26 | End: 2026-03-21

## 2025-03-26 NOTE — TELEPHONE ENCOUNTER
Spoke with patient, the Slynd is not working. She is only taking the active pills for the past three months.   She is getting her period twice a month and bleeds for 5-7 days. Her migraines are closely tied to her menstrual cycle. She is not interested in LARC.     Recommend tele visit in 3 months.

## 2025-03-27 ENCOUNTER — TELEPHONE (OUTPATIENT)
Facility: CLINIC | Age: 35
End: 2025-03-27

## 2025-03-27 NOTE — TELEPHONE ENCOUNTER
Note from payer: Details of this decision are provided on the physician outcome notice which has been faxed to the number on file.  Payer: [employer] [facility] Case ID: [id number]  Must have tried and had a poor response to medroxyprogesterone acetate tablets.   If it is stated the patient cannot take it for one or more of the following reasons :  It is contraindicated  It is likely to be less effective  It will cause an adverse reaction or other harm.   Will route for advise.

## 2025-03-31 RX ORDER — SERTRALINE HYDROCHLORIDE 100 MG/1
100 TABLET, FILM COATED ORAL DAILY
Qty: 30 TABLET | Refills: 1 | Status: SHIPPED | OUTPATIENT
Start: 2025-03-31

## 2025-03-31 NOTE — TELEPHONE ENCOUNTER
Spoke with Anahy GOLDEN With [facility] [employer]. Advised that the preferred drug would not be effective. She is faxing us a form to complete and send back with additional information for reconsideration.     Awaiting form to complete.

## 2025-03-31 NOTE — TELEPHONE ENCOUNTER
Faxed denial including chart notes to support medication for reconsideration.    Awaiting response.

## 2025-03-31 NOTE — TELEPHONE ENCOUNTER
Spoke with Maria Guadalupe BRADLEY From [employer]. The fax that was sent was for wrong medication. Per Maria Guadalupe, I just need to send the chart notes along with the denial letter for reconsideration.

## 2025-04-03 ENCOUNTER — PATIENT MESSAGE (OUTPATIENT)
Dept: FAMILY MEDICINE CLINIC | Facility: CLINIC | Age: 35
End: 2025-04-03

## 2025-04-03 RX ORDER — SERTRALINE HYDROCHLORIDE 100 MG/1
100 TABLET, FILM COATED ORAL DAILY
Qty: 30 TABLET | Refills: 1 | Status: SHIPPED | OUTPATIENT
Start: 2025-04-03

## 2025-04-15 DIAGNOSIS — G43.719 INTRACTABLE CHRONIC MIGRAINE WITHOUT AURA AND WITHOUT STATUS MIGRAINOSUS: ICD-10-CM

## 2025-04-15 RX ORDER — NARATRIPTAN 2.5 MG/1
5 TABLET ORAL AS NEEDED
Qty: 36 TABLET | Refills: 0 | Status: SHIPPED | OUTPATIENT
Start: 2025-04-15

## 2025-05-02 ENCOUNTER — TELEMEDICINE (OUTPATIENT)
Dept: FAMILY MEDICINE CLINIC | Facility: CLINIC | Age: 35
End: 2025-05-02
Payer: COMMERCIAL

## 2025-05-02 DIAGNOSIS — G47.19 EXCESSIVE DAYTIME SLEEPINESS: ICD-10-CM

## 2025-05-02 DIAGNOSIS — G43.719 INTRACTABLE CHRONIC MIGRAINE WITHOUT AURA AND WITHOUT STATUS MIGRAINOSUS: Primary | ICD-10-CM

## 2025-05-02 RX ORDER — RIMEGEPANT SULFATE 75 MG/75MG
75 TABLET, ORALLY DISINTEGRATING ORAL EVERY OTHER DAY
Qty: 45 TABLET | Refills: 1 | Status: SHIPPED | OUTPATIENT
Start: 2025-05-02 | End: 2025-06-01

## 2025-05-02 NOTE — PROGRESS NOTES
This visit is conducted using Telemedicine with live, interactive video and audio.    Patient has been referred to the [facility] website at [email or web address] to review the yearly Consent to Treat document.    Patient understands and accepts financial responsibility for any deductible, co-insurance and/or co-pays associated with this service.  [facility] Progress Note    SUBJECTIVE: Tammy Ramirez 34 year old female is here today for No chief complaint on file.      Tammy calls in for follow up on migraines, and had insurance change recently.    The previous insurance was denying nurtec     a week ago, and has insurance     Hasn't benefited from qulipta.    Had used nurtec as a rescue, and worked often. Now we will try to use a prevntive medication.    Migraines are very frequent.    Zoloft is working well, gabapentin seems to help with anxiety, not the same as a benzodiazepine, but helpful without the negative side effect    PMH  Past Medical History[1]     PSH  Past Surgical History[2]     Social Hx:  No changes    ROS  See HPI    OBJECTIVE:  There were no vitals taken for this visit.        Labs:          Meds:   Current Medications[3]      Assessment/Plan  Diagnoses and all orders for this visit:    Intractable chronic migraine without aura and without status migrainosus  -     Rimegepant Sulfate (NURTEC) 75 MG Oral Tablet Dispersible; Take 75 mg by mouth every other day.  -     Neuro Referral - In Network  -     Diagnostic Sleep Study-split night PAP implemented if criteria met  -     General sleep study; Future    Excessive daytime sleepiness  -     Diagnostic Sleep Study-split night PAP implemented if criteria met  -     General sleep study; Future         Has been having regular migraines, tries to avoid overusing triptans, but often needs regularly.    With qulipta failure, will trial nurtec as had acute success with it in the past    Has neurology consult coming up.         Total  Time spent with patient and coordinating care:  12 minutes.    Follow up: 2-3 months      Landon Long MD         [1]   Past Medical History:   Anemia    Anxiety    Arthritis    Not sure of the date but i have arthritus in my neck    Bilateral occipital neuralgia    Calculus of kidney    About a year and a half ago    Cervical facet syndrome    Cervical high risk HPV (human papillomavirus) test positive    Pap neg, +HPV 18/45 in 1st trimester.    Chronic hypertension    Since at least 1/2022 per chart review, though patient denies this diagnosis.    Chronic hypertension with superimposed pre-eclampsia (HCC)    Diagnosed with superimposed pre-eclampsia without severe features (elevated 24h urine protein 5/30/24) for which she was induced. Developed severe features postpartum. IV magnesium sulfate. Discharged home nifedipine 60 mg BID & labetalol 100 BID.    Chronic migraine without aura, with intractable migraine, so stated, with status migrainosus    chronic since age 11    DDD (degenerative disc disease), cervical    MRI C spine 2020 - Mild multilevel disc desiccation and bulging without significant loss of intervertebral disc space height. No significant spinal canal or neuroforaminal stenosis.    Dysmenorrhea    Essential hypertension    CHTN    CHEYANNE (generalized anxiety disorder)    per CareEverywhere    Gestational diabetes (HCC)    Gestational diabetes requiring insulin (HCC)    Dx 16 wk with GDM. On insulin by 20 wk    History of echocardiogram    3/31/2021 TTE - LVEF 55-60%. Unremarkable. Done for h/o palpitations, PVCs    History of echocardiogram    3/13/24 TTE at 21+ wk LVEF 60-65%. No wall motion abnormality.  No significant valvular stenosis or regurgitation.    Insomnia    Meningitis (HCC)    mid 20s. They thought maybe mumps?    Menometrorrhagia    Migraine    Obesity (BMI 30-39.9)    Palpitations    Holter monitor 2021 minor PVCs. 3/2021 Echo LVEF 55-60%, unremarkable.    Screening for genetic  disease carrier status    [employer] carrier screen NEGATIVE    Spinal headache    H/o spinal puncture headaches after epidurals   [2]   Past Surgical History:  Procedure Laterality Date    Colposcopy, cervix w/upper adjacent vagina; w/biopsy(s), cervix  02/07/2024    16w5d - cervical biopsy polypoid endocervical glandular tissue - Dr. Rock     inject anesthetic agent greater occipital nerve  01/10/2024    Bilateral greater occipital nerve block - Depo-medrol & 1% lidocaine - Dr. Barnes. Done at approx 12 wk gestation    Lumbar puncture/spinal tap(bedside)      mid 20s. Dx meningitis    Other surgical history  07/2021 7/2021: cervical medial branch block C2-C5, seen in ED after procedure for dizziness, left sided facial numbness, worsening headache.    Tonsillectomy     [3]   Current Outpatient Medications   Medication Sig Dispense Refill    Rimegepant Sulfate (NURTEC) 75 MG Oral Tablet Dispersible Take 75 mg by mouth every other day. 45 tablet 1    Naratriptan HCl 2.5 MG Oral Tab Take 2 tablets (5 mg total) by mouth as needed for Migraine. 36 tablet 0    sertraline 100 MG Oral Tab Take 1 tablet (100 mg total) by mouth daily. 30 tablet 1    norethindrone 5 MG Oral Tab Take 0.5 tablets (2.5 mg total) by mouth nightly. 45 tablet 3    gabapentin 300 MG Oral Cap Take 600mg in am, 300mg  Mid day, 600mg in evening 450 capsule 0    traZODone 100 MG Oral Tab Take 0.5 tablets (50 mg total) by mouth nightly as needed for Sleep. 30 tablet 1

## 2025-05-07 ENCOUNTER — TELEPHONE (OUTPATIENT)
Dept: PHYSICAL MEDICINE AND REHAB | Facility: CLINIC | Age: 35
End: 2025-05-07

## 2025-05-07 ENCOUNTER — OFFICE VISIT (OUTPATIENT)
Dept: NEUROLOGY | Facility: CLINIC | Age: 35
End: 2025-05-07
Payer: COMMERCIAL

## 2025-05-07 VITALS — DIASTOLIC BLOOD PRESSURE: 93 MMHG | SYSTOLIC BLOOD PRESSURE: 133 MMHG | HEART RATE: 84 BPM

## 2025-05-07 DIAGNOSIS — G43.E11 INTRACTABLE CHRONIC MIGRAINE WITH AURA WITH STATUS MIGRAINOSUS: Primary | ICD-10-CM

## 2025-05-07 RX ORDER — EPTINEZUMAB-JJMR 100 MG/ML
100 INJECTION INTRAVENOUS
Qty: 1.12 ML | Refills: 0 | Status: SHIPPED | OUTPATIENT
Start: 2025-05-07 | End: 2025-08-16

## 2025-05-07 NOTE — PROGRESS NOTES
[address], [address]  [facility] [address]  [phone]          [facility]  NEW PATIENT EVALUATION  Reason for Admission/Consultation:  headaches    Requested by:   PCP: Landon Long MD  Chief Complaint: Neurologic Problem (Patient presents here today to establish care, patient was referred by Landon Long MD to evaluate and treat migraines. Patient c/o 20 migraines a month w/ dizziness, nausea, and light sensitivity. Patient is taking NURTEC) 75 MG and Naratriptan HCl 2.5 MG/Patient gives verbal consent to use [facility]. /)      HPI:  Tammy Ramirez is a 34 year old  female w/ a pmhx of  chronic migraine who presents for  chronic migraine.     She has 25 migraine days a month.  She has severe disability from her migraines  and had to  stop working b/c of her headaches.  She has menstrual migraine.  Begins a couple of days before her cycle starts    Previously tried treatments  (need to fail 2 medications from 2 classes to qualify for emgality)     History of Present Illness  Tammy Ramirez is a 34 year old female with chronic migraines who presents for management of frequent and severe headaches.    She experiences approximately 25 headache days per month, with pain typically starting at the base of her skull and radiating to her eye and temple, primarily on the left side, but sometimes affecting both sides. The pain is pulsating and stabbing, often accompanied by tearing of the eyes and rhinorrhea. Photophobia, phonophobia, and osmophobia exacerbate her symptoms.    Her migraines have persisted since her teenage years, leading to frequent ER visits for severe episodes. She has tried numerous preventive treatments including venlafaxine, Botox, Nurtec, Aimovig, and Ajovy, with varying degrees of success. Botox initially reduced her migraines to 10-15 days per month but later became ineffective. Occipital nerve blocks provided temporary  relief, and facet joint injections were effective for a few days. She has also tried steroids and various triptans, with limited success.    Migraines worsened postpartum and around her menstrual cycle, despite being on birth control to skip periods. She has been on Nurtec for prevention, started a few days ago, and previously used it as a rescue medication with inconsistent results. She has a history of adverse reactions to ER migraine cocktails, particularly droperidol, which caused severe anxiety.    She has a history of insomnia, often waking up every few hours, and has been referred for a sleep study due to increased snoring. She drinks a lot of water and sleeps approximately six hours per night, though this is often interrupted by her child's needs.    Her father also suffered from migraines, suggesting a possible familial component. She has not been to an academic center for treatment and has not tried biofeedback or Vyepti infusions.    No aura, transient blurred vision, or pulsatile tinnitus. She experiences dizziness post-migraine and yawning as a pre-migraine symptom.        Review and summation of prior records  Neurology note \"This is a 34 y/o female with chronic intractible miograines. She has tried many different abortives and preventatives. Including Botox, Nurtec, Aimovig, Ajovy, Amitriptyline, nortriptyline, propranolol, Depakote, Topamax, venlafaxine, Norco, Fioricet, multiple titans, and OTC analgesics. She has had facet and cervical epidurals as well as SPG block none of which provided much relief. She states that al medications that she has tried either have not worked for her or had SEs or just abruptly stopped working.  She is currently taking up to 21 naratriptan per month for her migraines. I have cautioned her against medication overuse as this could be contributing to her migraines. Her description of the pain (radiating from the occipital area over the head and into the eyes) may have an  occipital neuralgia. I will try occipital nerve blocks as well. \"      ROS:  Pertinent positive and negatives per HPI.  All others were reviewed and negative.    Past Medical History[1]    Medications - Current[2]    Allergies[3]    Past Surgical History[4]    Family History[5]    Social history:  History   Smoking Status    Never   Smokeless Tobacco    Never     History   Alcohol Use Never     History   Drug Use Unknown     Comment: Recreaction in the past. NOT current.       Family History[6]    Objective:  Vitals  Blood pressure (!) 133/93, pulse 84, not currently breastfeeding.  BP (!) 133/93 (BP Location: Left arm, Patient Position: Sitting, Cuff Size: large)   Pulse 84   Breastfeeding No   There is no height or weight on file to calculate BMI.  Vitals:    05/07/25 1538   Patient Position: Sitting   BP Location: Left arm        Exam:  - General: appears stated age and no distress    - Pulmonary: CTAB. No signs of respiratory distress.    Neurologic Exam  - Mental Status: Alert and attentive. .  Speech is spontaneous, fluent, and prosodic. Comprehension and repetition intact. Phrase length and rate are normal. No paraphasic errors, neologisms, anomia, acalculia, apraxia, anosognosia, or R/L confusion.   - Cranial Nerves: No gaze preference. Visual fields:normal.  Pupils are equally round and reactive to light  in a well lit room. EOMI. No nystagmus. No ptosis. V1 to V3 intact to LT and temperature in all 3 branches. No pathological facial asymmetry. No flattening of the nasolabial fold. .  Hearing grossly intact.  Tongue midline. No atrophy or fasiculations of the tongue noted. Palate and uvula elevate symmetrically.  Shoulder shrug symmetric.  - Fundoscopic exam:normal w/o hemorrhages, exudates, or papilledema.No attenuation. No pallor.  - Motor:  normal tone, normal bulk. No interosseous wasting. No flattening of hypothenar eminences.       Right Left     Motor  Strength   Deltoids 5 5  Triceps 5 5  Biceps 5 5  Wrist Extensors 5 5   5 5   Hip Flexors 5 5          Pronator drift: No pronator drift   Arm Rolling: No orbiting.   Finger Taps: Finger taps are symmetric in rate and amplitude.    Rapid movements: Rapid/fine movements are symmetric. As expected their dominant hand is slightly faster.   Foot Taps: Foot taps are symmetric.      Asterixis: No asterixis noted.   Tremor:        - Sensory:   Light touch: normal     - Cerebellum: No truncal ataxia. No titubations. No dysmetria, no dysdiadochokinesis. No overshoot.   - Gait/station: Normal gait and station. Symmetric arm swing.        Data reviewed    Test results/Imaging:   Lab Results   Component Value Date    TSH 0.341 (L) 02/12/2024     No results found for: \"CHOL\", \"HDL\", \"LDL\", \"TRIG\"  Lab Results   Component Value Date    HGB 13.5 09/28/2024    HCT 40.3 09/28/2024    MCV 88.0 09/28/2024    WBC 10.3 09/28/2024    .0 09/28/2024      Lab Results   Component Value Date    BUN 11 09/28/2024    CA 10.0 09/28/2024    ALT 28 09/28/2024    AST 19 09/28/2024    ALB 4.9 (H) 09/28/2024     09/28/2024    K 4.6 09/28/2024     09/28/2024    CO2 24.0 09/28/2024      I have reviewed labs.                  Assessment & Plan  Chronic Migraine  Chronic migraines with 25 headache days per month, typically unilateral, originating at the base of the skull and radiating to the eye and temple. Exacerbated by menstruation and weather changes. Previous treatments include Botox, Nurtec, Aimovig, Ajovy, occipital nerve blocks, and various oral medications with limited success. Botox initially effective but later ineffective. Occipital nerve blocks provided temporary relief. Nurtec recently started as preventative. Migranal infusion effective in ER but not as nasal spray. Adverse reactions to ER migraine cocktails, particularly droperidol. Considering Vyepti infusions and pain management referral for potential ablation due  to neck involvement. She is open to trying any treatment and has applied for disability due to worsening condition.  - Initiate Vyepti infusions every three months.  - Continue Nurtec as a preventative measure.  - Refer to pain management for evaluation of potential ablation.  - Submit prior authorization for Botox and Vyepti.  - Advise reduction in triptan use.    Insomnia  Chronic insomnia with difficulty maintaining sleep, exacerbated by caring for a young child. Insomnia since adolescence. Sleep disturbances may contribute to migraine frequency and severity.    Snoring  Increased snoring with referral for sleep study to evaluate for potential sleep apnea.  - Schedule sleep study.        1. Intractable chronic migraine with aura with status migrainosus  - SPECIALTY (OTHER) - INTERNAL  - [facility] ([facility]PT[facility]) 100 MG/ML Intravenous Solution injection; Inject 1 mL (100 mg total) into the vein every 3 (three) months.  Dispense: 1.12 mL; Refill: 0  - EVALUATE & TREAT, ONC (EXT)      No orders of the defined types were placed in this encounter.          Education/Instructions given to: patient   Barriers to Learning:None  Content: Refer to note above. Evaluation/Outcome: Verbalized understanding    This document is not intended to support charting by exception.  Sections left blank in a completed note should be presumed not to have been done.    Education and counseling provided to patient. Instructed patient to call my office or seek medical attention immediately if symptoms worsen.  All questions were answered. All side effects of drugs were discussed.          Return to clinic in: Return in about 2 months (around 7/7/2025).    Henry Molina DO  Staff Vascular & General Neurology   05/07/25  3:54 PM         [1]   Past Medical History:   Anemia    Anxiety    Arthritis    Not sure of the date but i have arthritus in my neck    Bilateral occipital neuralgia    Calculus of kidney    About a year and a half ago     Cervical facet syndrome    Cervical high risk HPV (human papillomavirus) test positive    Pap neg, +HPV 18/45 in 1st trimester.    Chronic hypertension    Since at least 1/2022 per chart review, though patient denies this diagnosis.    Chronic hypertension with superimposed pre-eclampsia (HCC)    Diagnosed with superimposed pre-eclampsia without severe features (elevated 24h urine protein 5/30/24) for which she was induced. Developed severe features postpartum. IV magnesium sulfate. Discharged home nifedipine 60 mg BID & labetalol 100 BID.    Chronic migraine without aura, with intractable migraine, so stated, with status migrainosus    chronic since age 11    DDD (degenerative disc disease), cervical    MRI C spine 2020 - Mild multilevel disc desiccation and bulging without significant loss of intervertebral disc space height. No significant spinal canal or neuroforaminal stenosis.    Dysmenorrhea    Essential hypertension    CHTN    CHEYANNE (generalized anxiety disorder)    per CareEverywhere    Gestational diabetes (HCC)    Gestational diabetes requiring insulin (HCC)    Dx 16 wk with GDM. On insulin by 20 wk    History of echocardiogram    3/31/2021 TTE - LVEF 55-60%. Unremarkable. Done for h/o palpitations, PVCs    History of echocardiogram    3/13/24 TTE at 21+ wk LVEF 60-65%. No wall motion abnormality.  No significant valvular stenosis or regurgitation.    Insomnia    Meningitis ([facility])    mid 20s. They thought maybe mumps?    Menometrorrhagia    Migraine    Obesity (BMI 30-39.9)    Palpitations    Holter monitor 2021 minor PVCs. 3/2021 Echo LVEF 55-60%, unremarkable.    Screening for genetic disease carrier status    [employer]ae carrier screen NEGATIVE    Spinal headache    H/o spinal puncture headaches after epidurals   [2]   Current Outpatient Medications:     Rimegepant Sulfate (NURTEC) 75 MG Oral Tablet Dispersible, Take 75 mg by mouth every other day., Disp: 45 tablet, Rfl: 1    Naratriptan HCl 2.5 MG Oral Tab,  Take 2 tablets (5 mg total) by mouth as needed for Migraine., Disp: 36 tablet, Rfl: 0    sertraline 100 MG Oral Tab, Take 1 tablet (100 mg total) by mouth daily., Disp: 30 tablet, Rfl: 1    norethindrone 5 MG Oral Tab, Take 0.5 tablets (2.5 mg total) by mouth nightly., Disp: 45 tablet, Rfl: 3    gabapentin 300 MG Oral Cap, Take 600mg in am, 300mg  Mid day, 600mg in evening, Disp: 450 capsule, Rfl: 0    traZODone 100 MG Oral Tab, Take 0.5 tablets (50 mg total) by mouth nightly as needed for Sleep. (Patient not taking: Reported on 5/7/2025), Disp: 30 tablet, Rfl: 1  [3]   Allergies  Allergen Reactions    Droperidol ANXIETY, RESTLESSNESS and Tightness in Chest   [4]   Past Surgical History:  Procedure Laterality Date    Colposcopy, cervix w/upper adjacent vagina; w/biopsy(s), cervix  02/07/2024    16w5d - cervical biopsy polypoid endocervical glandular tissue - Dr. Rock    Hc inject anesthetic agent greater occipital nerve  01/10/2024    Bilateral greater occipital nerve block - Depo-medrol & 1% lidocaine - Dr. Barnes. Done at approx 12 wk gestation    Lumbar puncture/spinal tap(bedside)      mid 20s. Dx meningitis    Other surgical history  07/2021 7/2021: cervical medial branch block C2-C5, seen in ED after procedure for dizziness, left sided facial numbness, worsening headache.    Tonsillectomy     [5]   Family History  Problem Relation Age of Onset    Other (Brugada syndrome) Father         defibrillator    Heart Disorder Father         Atrial flutter    Migraines Father         very difficult to treat    Other (Vasculitis) Father     Genetic Disease Father         Brugada Disease    Dementia Paternal Grandfather     Asthma Paternal Grandmother    [6]   Family History  Problem Relation Age of Onset    Other (Brugada syndrome) Father         defibrillator    Heart Disorder Father         Atrial flutter    Migraines Father         very difficult to treat    Other (Vasculitis) Father     Genetic Disease Father          Brugada Disease    Dementia Paternal Grandfather     Asthma Paternal Grandmother

## 2025-05-07 NOTE — PROGRESS NOTES
The following individual(s) verbally consented to be recorded using ambient AI listening technology and understand that they can each withdraw their consent to this listening technology at any point by asking the clinician to turn off or pause the recording:    Patient name: Tammy Ramirez  Additional names:  ANAHI KIRBY

## 2025-05-07 NOTE — TELEPHONE ENCOUNTER
Initiated authorization for Botox 200 units. CPT/HCPCS , 14022 dx:G43.E11 with [employer] portal.    I contacted the patient and informed her that I have been unable to proceed as her insurance card has not been scanned and I am unable to locate a number to reach the patients insurance company as she has [place].    Patient stated that she will contact her  so that he can get the number off the back of his card.  Will await the patients response via [employer] and proceed.

## 2025-05-08 ENCOUNTER — TELEPHONE (OUTPATIENT)
Dept: NEUROLOGY | Facility: CLINIC | Age: 35
End: 2025-05-08

## 2025-05-08 NOTE — TELEPHONE ENCOUNTER
Rx called in advised that they medication for eptinezumab-jjmr (VYEPTI) 100 MG/ML Intravenous Solution injection . Advised this isnt something they would be able to dispense  to the pt since its an IV medication.

## 2025-05-12 ENCOUNTER — PATIENT MESSAGE (OUTPATIENT)
Dept: NEUROLOGY | Facility: CLINIC | Age: 35
End: 2025-05-12

## 2025-05-15 ENCOUNTER — MED REC SCAN ONLY (OUTPATIENT)
Dept: NEUROLOGY | Facility: CLINIC | Age: 35
End: 2025-05-15

## 2025-05-15 ENCOUNTER — TELEPHONE (OUTPATIENT)
Dept: NEUROLOGY | Facility: CLINIC | Age: 35
End: 2025-05-15

## 2025-05-15 NOTE — TELEPHONE ENCOUNTER
Start form of Vyepti was filled out by patient and MA, fax over to [phone] with start form, insurance information, face sheet, medication information, and progress notes. Fax confirmed.     Vyepti order was fax over to infusion center in [place] at [phone] with order and face sheet. Fax confirmed.       Sent to scanning and place copy in nurses bin.

## 2025-05-16 ENCOUNTER — PATIENT MESSAGE (OUTPATIENT)
Dept: FAMILY MEDICINE CLINIC | Facility: CLINIC | Age: 35
End: 2025-05-16

## 2025-05-16 DIAGNOSIS — F41.9 ANXIETY AND DEPRESSION: ICD-10-CM

## 2025-05-16 DIAGNOSIS — F32.A ANXIETY AND DEPRESSION: ICD-10-CM

## 2025-05-16 RX ORDER — GABAPENTIN 300 MG/1
CAPSULE ORAL
Qty: 450 CAPSULE | Refills: 0 | Status: SHIPPED | OUTPATIENT
Start: 2025-05-16

## 2025-05-16 NOTE — TELEPHONE ENCOUNTER
Requesting   Requested Prescriptions     Pending Prescriptions Disp Refills    gabapentin 300 MG Oral Cap 450 capsule 0     Sig: Take 600mg in am, 300mg  Mid day, 600mg in evening       LOV: 52/25 telemed    Filled: 3/17/2025 #450 with 0 refills    Future Appointments   Date Time Provider Department Center   7/10/2025  4:00 PM Henry Molina DO [facility]   7/25/2025  8:15 PM SCHEDULE BY DATE [facility]

## 2025-05-28 DIAGNOSIS — G43.719 INTRACTABLE CHRONIC MIGRAINE WITHOUT AURA AND WITHOUT STATUS MIGRAINOSUS: ICD-10-CM

## 2025-05-29 ENCOUNTER — TELEPHONE (OUTPATIENT)
Dept: NEUROLOGY | Facility: CLINIC | Age: 35
End: 2025-05-29

## 2025-05-29 DIAGNOSIS — G43.711 CHRONIC MIGRAINE WITHOUT AURA, WITH INTRACTABLE MIGRAINE, SO STATED, WITH STATUS MIGRAINOSUS: Primary | ICD-10-CM

## 2025-05-29 RX ORDER — NARATRIPTAN 2.5 MG/1
5 TABLET ORAL AS NEEDED
Qty: 36 TABLET | Refills: 0 | Status: SHIPPED | OUTPATIENT
Start: 2025-05-29

## 2025-05-29 RX ORDER — SERTRALINE HYDROCHLORIDE 100 MG/1
100 TABLET, FILM COATED ORAL DAILY
Qty: 30 TABLET | Refills: 1 | Status: SHIPPED | OUTPATIENT
Start: 2025-05-29

## 2025-05-29 RX ORDER — EPTINEZUMAB-JJMR 100 MG/ML
100 INJECTION INTRAVENOUS
Qty: 1 ML | Refills: 3 | Status: SHIPPED | OUTPATIENT
Start: 2025-05-29 | End: 2025-06-26

## 2025-05-29 NOTE — TELEPHONE ENCOUNTER
An order for Vyepti has been uploaded and pend for provider to review and sign if appropriate .  Requested by infusion center.          Medication Quantity Refills Start End   eptinezumab-jjmr (VYEPTI) 100 MG/ML Intravenous Solution injection 1.12 mL 0 5/7/2025 8/16/2025   Sig:   Inject 1 mL (100 mg total) into the vein every 3 (three) months.     Route:   Intravenous     Order #:   [id number]     With a quantity of 1.00 mL

## 2025-05-30 NOTE — TELEPHONE ENCOUNTER
Faxed over orders for Vyepti to the infusion clinic [phone]. Received the confirmation fax. Spoke with Felicity she has received and faxed over orders to [facility].

## 2025-06-03 ENCOUNTER — OFFICE VISIT (OUTPATIENT)
Dept: PAIN CLINIC | Facility: HOSPITAL | Age: 35
End: 2025-06-03
Attending: ANESTHESIOLOGY
Payer: COMMERCIAL

## 2025-06-03 VITALS
DIASTOLIC BLOOD PRESSURE: 100 MMHG | OXYGEN SATURATION: 97 % | SYSTOLIC BLOOD PRESSURE: 139 MMHG | HEART RATE: 80 BPM | BODY MASS INDEX: 36 KG/M2 | WEIGHT: 210 LBS

## 2025-06-03 DIAGNOSIS — M47.812 CERVICAL FACET SYNDROME: ICD-10-CM

## 2025-06-03 DIAGNOSIS — G44.86 CERVICOGENIC HEADACHE: Primary | ICD-10-CM

## 2025-06-03 DIAGNOSIS — M47.812 CERVICAL SPONDYLOSIS: ICD-10-CM

## 2025-06-03 DIAGNOSIS — G43.E11 INTRACTABLE CHRONIC MIGRAINE WITH AURA WITH STATUS MIGRAINOSUS: ICD-10-CM

## 2025-06-03 DIAGNOSIS — M54.2 NECK PAIN: ICD-10-CM

## 2025-06-03 PROCEDURE — 99204 OFFICE O/P NEW MOD 45 MIN: CPT | Performed by: ANESTHESIOLOGY

## 2025-06-03 NOTE — PATIENT INSTRUCTIONS
Refill policies:    Allow 2-3 business days for refills; controlled substances may take longer.  Contact your pharmacy at least 5 days prior to running out of medication and have them send an electronic request or submit request through the “request refill” option in your [employer] account.  Refills are not addressed on weekends; covering physicians do not authorize routine medications on weekends.  No narcotics or controlled substances are refilled after noon on Fridays or by on call physicians.  By law, narcotics must be electronically prescribed.  A 30 day supply with no refills is the maximum allowed.  If your prescription is due for a refill, you may be due for a follow up appointment.  To best provide you care, patients receiving routine medications need to be seen at least once a year.  Patients receiving narcotic/controlled substance medications need to be seen at least once every 3 months.  In the event that your preferred pharmacy does not have the requested medication in stock (e.g. Backordered), it is your responsibility to find another pharmacy that has the requested medication available.  We will gladly send a new prescription to that pharmacy at your request.    Scheduling Tests:    If your physician has ordered radiology tests such as MRI or CT scans, please contact Central Scheduling at [phone] right away to schedule the test.  Once scheduled, the [facility] Centralized Referral Team will work with your insurance carrier to obtain pre-certification or prior authorization.  Depending on your insurance carrier, approval may take 3-10 days.  It is highly recommended patients assure they have received an authorization before having a test performed.  If test is done without insurance authorization, patient may be responsible for the entire amount billed.      Precertification and Prior Authorizations:  If your physician has recommended that you have a procedure or additional testing performed the [facility]  Centralized Referral Team will contact your insurance carrier to obtain pre-certification or prior authorization.    You are strongly encouraged to contact your insurance carrier to verify that your procedure/test has been approved and is a COVERED benefit.  Although the [facility] Centralized Referral Team does its due diligence, the insurance carrier gives the disclaimer that \"Although the procedure is authorized, this does not guarantee payment.\"    Ultimately the patient is responsible for payment.   Thank you for your understanding in this matter.  Paperwork Completion:  If you require FMLA or disability paperwork for your recovery, please make sure to either drop it off or have it faxed to our office at [phone]. Be sure the form has your name and date of birth on it.  The form will be faxed to our Forms Department and they will complete it for you.  There is a 25$ fee for all forms that need to be filled out.  Please be aware there is a 10-14 day turnaround time.  You will need to sign a release of information (YOAN) form if your paperwork does not come with one.  You may call the Forms Department with any questions at [phone].  Their fax number is [phone].
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
## 2025-06-03 NOTE — PROGRESS NOTES
Location of Pain: neck and shoulders    Date Pain Began: since childhood, have been getting worse as an adult       Work Related:   No        Receiving Work Comp/Disability:   No    Numeric Rating Scale:  Pain at Present:  5                                                                                                            (No Pain) 0  to  10 (Worst Pain)                 Minimum Pain:   5  Maximum Pain  8    Distribution of Pain:    bilateral    Quality of Pain:   throbbing    Origin of Pain:    unkknown    Aggravating Factors:    Other weather, strain on neck     Past Treatments for Current Pain Condition:   NSAIDS and Other occipital nerve block, epidural, botox    Prior diagnostic testing for your pain:  MRI, CT and XR

## 2025-06-06 ENCOUNTER — PATIENT MESSAGE (OUTPATIENT)
Dept: NEUROLOGY | Facility: CLINIC | Age: 35
End: 2025-06-06

## 2025-06-09 ENCOUNTER — PATIENT MESSAGE (OUTPATIENT)
Dept: FAMILY MEDICINE CLINIC | Facility: CLINIC | Age: 35
End: 2025-06-09

## 2025-06-09 DIAGNOSIS — G43.719 INTRACTABLE CHRONIC MIGRAINE WITHOUT AURA AND WITHOUT STATUS MIGRAINOSUS: ICD-10-CM

## 2025-06-09 PROBLEM — M47.812 CERVICAL SPONDYLOSIS: Status: ACTIVE | Noted: 2025-06-09

## 2025-06-09 PROBLEM — M54.2 NECK PAIN: Status: ACTIVE | Noted: 2025-06-09

## 2025-06-09 PROBLEM — G44.86 CERVICOGENIC HEADACHE: Status: ACTIVE | Noted: 2025-06-09

## 2025-06-10 RX ORDER — RIMEGEPANT SULFATE 75 MG/75MG
75 TABLET, ORALLY DISINTEGRATING ORAL EVERY OTHER DAY
Qty: 45 TABLET | Refills: 1 | Status: SHIPPED | OUTPATIENT
Start: 2025-06-10 | End: 2025-07-10

## 2025-06-12 NOTE — TELEPHONE ENCOUNTER
[facility] called in to know whether pt got new insurance [place] since [place] 1st. Advised they havent been able to reach pt and medication has been put on hold for veypti.

## 2025-06-24 ENCOUNTER — TELEPHONE (OUTPATIENT)
Dept: NEUROLOGY | Facility: CLINIC | Age: 35
End: 2025-06-24

## 2025-06-24 NOTE — TELEPHONE ENCOUNTER
Called to initiate PA at [phone]. Spoke with Rosa, it was a bad connection. She states she will call us back. Pharmacy services [phone],pharmacists:[phone]

## 2025-06-26 ENCOUNTER — TELEPHONE (OUTPATIENT)
Facility: CLINIC | Age: 35
End: 2025-06-26

## 2025-06-27 NOTE — TELEPHONE ENCOUNTER
Prior authorization approved  Payer: [employer] [facility] Case ID: [id number]    [phone]    [phone]  Note from payer: The case has been Approved from  [id number] to [id number]  Approval Details    Authorized from January 1, 2025 to March 31, 2026

## 2025-07-07 NOTE — TELEPHONE ENCOUNTER
See [employer] message.  Pls fill medication asap.  Pt will need to go to the ER if not done now.

## 2025-07-25 NOTE — TELEPHONE ENCOUNTER
Pt called stating under her insurance she no longer in network to see dr sandoval. She is looking into scheduling with a pain clinic up in [place], but she would need a referral from dr sandoval in order to schedule with them. Please advise

## (undated) NOTE — LETTER
23    RE: Wilmar Isaac    : 1990    Dear Dr. Paras Pablo,    Your patient is being scheduled for a pain management procedure at [facility]. Procedure:  Bilateral Occipital Nerve Block   Date of Procedure: TBD -pending medical clearance. Physician: Suman Montero- Anesthesiologist     Your patient is to be scheduled for Bilateral Occipital Nerve Block in our office. Please verify patient is cleared to proceed with pain management procedures. Clearance Approved   ____________    Clearance Denied       ____________      Comments: ______________________________________________________    Signature: ________________________________  Date: _________________       If you have any questions please feel free to contact our office at [phone], option # 2. Please fax this clearance request to our office at fax # [phone] or send electronically.        Thank you,

## (undated) NOTE — Clinical Note
Hi Dr. Rosado! Tammy came to endo for newly dx'd gestational DM (no prev hx of DM), I believe it was a scheduling error because she was referred to diabetes center educators. I ordered her a glucometer and told her to continue to f/u with OB and your group with blood sugar readings but she may need more guidance on how often you want her to test/how to submit- not sure what the usual protocol is when diet controlled. Let me know if there's anything I can help communicate to her in the interim--- Thanks! Hope all is well- HILARIO Russell

## (undated) NOTE — LETTER
Dear New MomKelly, we missed you! The nurses of [facility] have tried to reach you by phone to ask if you have any questions regarding your health or the health and care of your new little one.    We hope you are doing well. If, for any reason, you have questions or concerns about your health or your baby’s health, please contact your provider or your pediatrician or family medicine physician regarding your baby.     At [facility], we feel that postpartum support is very important for new families. Please see the enclosed new parent support flyer that lists support programs and resources with both in-person and online options.     Additionally, our Breastfeeding Centers at [facility] and [facility] in [place], offer outpatient visits with our International Board-Certified Lactation   Consultants (IBCLCs) for any breastfeeding concerns or questions you may have.    For issues related to stress, anxiety or depression, we have a Nurturing Mom support group that meets both in-person or online.  There’s also a 24-hour Mom’s Line where you can request a phone call from a clinical therapist for assistance for postpartum depression.    We encourage you to take advantage of these programs and resources as you recover from childbirth and learn to care for your new infant.    Best wishes,    [facility]            [id number]

## (undated) NOTE — LETTER
Patient Name: Tammy Ramirez        : 1990       Medical Record #: [id number]    CONSENT FOR PROCEDURES/SEDATION    Date: 2024       Time: 4:13 PM        1. I authorize the performance upon Tammy Ramirez the following:    ___BILATERAL GREATER OCCIPITAL NERVE BLOCK    _________________    2. I authorize Dr. Barnes (and whomever is designated as the doctor’s assistant), to perform the above mentioned procedures.    3. If any unforeseen conditions arise during this procedure calling for additional procedures, operations, or medications (including anesthesia and blood transfusion), I  further request and authorize the doctor to do whatever he/she deems advisable in my interest.    4. I consent to the taking and reproduction of any photographs in the course of this procedure for professional purposes.    5. I consent to the administration of such sedation as may be considered necessary or advisable by the physician responsible for this service, with the exception of  _____________________________.    6. I have been informed by my doctor of the nature and purpose of this procedure/sedation, possible alternative methods of treatment, risk involved and possible complications.      Signature of Patient:  ___________________________    Signature of person authorized to consent for patient: Relationship to patient:  ___________________________    ___________________    Witness: ____________________     Date: ______________    Provider: ____________________     Date: ______________

## (undated) NOTE — LETTER
Tammysa Steffi Ramirez, :1990    CONSENT FOR PROCEDURE/SEDATION    1. I authorize the performance upon Tammy Perezt  the following:  Colpo    2. I authorize Dr. Meaghan Rock,  (and whomever is designated as the doctor’s assistant), to perform the above-mentioned procedures.    3. If any unforeseen conditions arise during this procedure calling for additional  procedures, operations, or medications (including anesthesia and blood transfusion), I further request and authorize the doctor to do whatever he/she deems advisable in my interest.    4. I consent to the taking and reproduction of any photographs in the course of this procedure for professional purposes.    5. I consent to the administration of such sedation as may be considered necessary or advisable by the physician responsible for this service, with the exception of    6. I have been informed by my doctor of the nature and purpose of this procedure sedation, possible alternative methods of treatment, risk involved and possible complications.    7. If I have a Do Not Resuscitate (DNR) order in place, the physician and I (or the individual authorized to consent on my behalf) will discuss and agree as to whether the Do Not Resuscitate (DNR) order will remain in effect or will be discontinued during the performance of the procedure and the applicable recovery period. If the Do Not Resuscitate (DNR) order is discontinued and is to be reinstated following the procedure/recovery period, the physician will determine when the applicable recovery period ends for purposes of reinstating the Do Not Resuscitate (DNR) order.    Signature of Patient:_______________________________________________    Signature of person authorized to consent for patient:  _______________________________________________________________    Relationship to patient: ____________________________________________    Witness: _________________________________________  Date:___________     Physician Signature: _______________________________ Date:___________